# Patient Record
Sex: MALE | Race: WHITE | NOT HISPANIC OR LATINO | Employment: OTHER | URBAN - METROPOLITAN AREA
[De-identification: names, ages, dates, MRNs, and addresses within clinical notes are randomized per-mention and may not be internally consistent; named-entity substitution may affect disease eponyms.]

---

## 2017-01-14 ENCOUNTER — APPOINTMENT (OUTPATIENT)
Dept: LAB | Facility: HOSPITAL | Age: 62
End: 2017-01-14
Attending: INTERNAL MEDICINE
Payer: COMMERCIAL

## 2017-01-14 ENCOUNTER — TRANSCRIBE ORDERS (OUTPATIENT)
Dept: LAB | Facility: HOSPITAL | Age: 62
End: 2017-01-14

## 2017-01-14 DIAGNOSIS — Z12.5 SPECIAL SCREENING FOR MALIGNANT NEOPLASM OF PROSTATE: ICD-10-CM

## 2017-01-14 DIAGNOSIS — I10 ESSENTIAL HYPERTENSION, MALIGNANT: ICD-10-CM

## 2017-01-14 DIAGNOSIS — Z12.5 SPECIAL SCREENING FOR MALIGNANT NEOPLASM OF PROSTATE: Primary | ICD-10-CM

## 2017-01-14 LAB
ALBUMIN SERPL BCP-MCNC: 3.6 G/DL (ref 3.5–5)
ALP SERPL-CCNC: 48 U/L (ref 46–116)
ALT SERPL W P-5'-P-CCNC: 37 U/L (ref 12–78)
ANION GAP SERPL CALCULATED.3IONS-SCNC: 5 MMOL/L (ref 4–13)
AST SERPL W P-5'-P-CCNC: 14 U/L (ref 5–45)
BILIRUB SERPL-MCNC: 0.48 MG/DL (ref 0.2–1)
BUN SERPL-MCNC: 17 MG/DL (ref 5–25)
CALCIUM SERPL-MCNC: 8.9 MG/DL (ref 8.3–10.1)
CHLORIDE SERPL-SCNC: 106 MMOL/L (ref 100–108)
CO2 SERPL-SCNC: 29 MMOL/L (ref 21–32)
CREAT SERPL-MCNC: 0.91 MG/DL (ref 0.6–1.3)
EST. AVERAGE GLUCOSE BLD GHB EST-MCNC: 157 MG/DL
GFR SERPL CREATININE-BSD FRML MDRD: >60 ML/MIN/1.73SQ M
GLUCOSE SERPL-MCNC: 191 MG/DL (ref 65–140)
HBA1C MFR BLD: 7.1 % (ref 4.2–6.3)
POTASSIUM SERPL-SCNC: 4.4 MMOL/L (ref 3.5–5.3)
PROT SERPL-MCNC: 6.8 G/DL (ref 6.4–8.2)
PSA SERPL-MCNC: 0.6 NG/ML (ref 0–4)
SODIUM SERPL-SCNC: 140 MMOL/L (ref 136–145)

## 2017-01-14 PROCEDURE — G0103 PSA SCREENING: HCPCS

## 2017-01-14 PROCEDURE — 80053 COMPREHEN METABOLIC PANEL: CPT

## 2017-01-14 PROCEDURE — 83036 HEMOGLOBIN GLYCOSYLATED A1C: CPT

## 2017-01-14 PROCEDURE — 36415 COLL VENOUS BLD VENIPUNCTURE: CPT

## 2017-10-20 LAB
LEFT EYE DIABETIC RETINOPATHY: NORMAL
RIGHT EYE DIABETIC RETINOPATHY: NORMAL

## 2018-09-06 ENCOUNTER — TRANSCRIBE ORDERS (OUTPATIENT)
Dept: LAB | Facility: HOSPITAL | Age: 63
End: 2018-09-06

## 2018-09-06 ENCOUNTER — APPOINTMENT (OUTPATIENT)
Dept: LAB | Facility: HOSPITAL | Age: 63
End: 2018-09-06
Attending: INTERNAL MEDICINE
Payer: COMMERCIAL

## 2018-09-06 DIAGNOSIS — E11.00 UNCONTROLLED TYPE 2 DIABETES MELLITUS WITH HYPEROSMOLARITY WITHOUT COMA, WITHOUT LONG-TERM CURRENT USE OF INSULIN (HCC): Primary | ICD-10-CM

## 2018-09-06 LAB
ALBUMIN SERPL BCP-MCNC: 3.6 G/DL (ref 3.5–5)
ALP SERPL-CCNC: 38 U/L (ref 46–116)
ALT SERPL W P-5'-P-CCNC: 35 U/L (ref 12–78)
ANION GAP SERPL CALCULATED.3IONS-SCNC: 8 MMOL/L (ref 4–13)
AST SERPL W P-5'-P-CCNC: 17 U/L (ref 5–45)
BASOPHILS # BLD AUTO: 0.03 THOUSANDS/ΜL (ref 0–0.1)
BASOPHILS NFR BLD AUTO: 0 % (ref 0–1)
BILIRUB SERPL-MCNC: 0.76 MG/DL (ref 0.2–1)
BUN SERPL-MCNC: 13 MG/DL (ref 5–25)
CALCIUM SERPL-MCNC: 8.6 MG/DL (ref 8.3–10.1)
CHLORIDE SERPL-SCNC: 104 MMOL/L (ref 100–108)
CHOLEST SERPL-MCNC: 99 MG/DL (ref 50–200)
CO2 SERPL-SCNC: 23 MMOL/L (ref 21–32)
CREAT SERPL-MCNC: 0.92 MG/DL (ref 0.6–1.3)
CREAT UR-MCNC: 53.5 MG/DL
EOSINOPHIL # BLD AUTO: 0.3 THOUSAND/ΜL (ref 0–0.61)
EOSINOPHIL NFR BLD AUTO: 3 % (ref 0–6)
ERYTHROCYTE [DISTWIDTH] IN BLOOD BY AUTOMATED COUNT: 12.8 % (ref 11.6–15.1)
EST. AVERAGE GLUCOSE BLD GHB EST-MCNC: 160 MG/DL
GFR SERPL CREATININE-BSD FRML MDRD: 88 ML/MIN/1.73SQ M
GLUCOSE P FAST SERPL-MCNC: 131 MG/DL (ref 65–99)
HBA1C MFR BLD: 7.2 % (ref 4.2–6.3)
HCT VFR BLD AUTO: 45.6 % (ref 36.5–49.3)
HDLC SERPL-MCNC: 40 MG/DL (ref 40–60)
HGB BLD-MCNC: 15.3 G/DL (ref 12–17)
IMM GRANULOCYTES # BLD AUTO: 0.02 THOUSAND/UL (ref 0–0.2)
IMM GRANULOCYTES NFR BLD AUTO: 0 % (ref 0–2)
LDLC SERPL CALC-MCNC: 47 MG/DL (ref 0–100)
LYMPHOCYTES # BLD AUTO: 3.21 THOUSANDS/ΜL (ref 0.6–4.47)
LYMPHOCYTES NFR BLD AUTO: 34 % (ref 14–44)
MCH RBC QN AUTO: 31 PG (ref 26.8–34.3)
MCHC RBC AUTO-ENTMCNC: 33.6 G/DL (ref 31.4–37.4)
MCV RBC AUTO: 93 FL (ref 82–98)
MICROALBUMIN UR-MCNC: <5 MG/L (ref 0–20)
MICROALBUMIN/CREAT 24H UR: <9 MG/G CREATININE (ref 0–30)
MONOCYTES # BLD AUTO: 1.06 THOUSAND/ΜL (ref 0.17–1.22)
MONOCYTES NFR BLD AUTO: 11 % (ref 4–12)
NEUTROPHILS # BLD AUTO: 4.89 THOUSANDS/ΜL (ref 1.85–7.62)
NEUTS SEG NFR BLD AUTO: 52 % (ref 43–75)
NONHDLC SERPL-MCNC: 59 MG/DL
NRBC BLD AUTO-RTO: 0 /100 WBCS
PLATELET # BLD AUTO: 269 THOUSANDS/UL (ref 149–390)
PMV BLD AUTO: 10.3 FL (ref 8.9–12.7)
POTASSIUM SERPL-SCNC: 3.7 MMOL/L (ref 3.5–5.3)
PROT SERPL-MCNC: 6.7 G/DL (ref 6.4–8.2)
RBC # BLD AUTO: 4.93 MILLION/UL (ref 3.88–5.62)
SODIUM SERPL-SCNC: 135 MMOL/L (ref 136–145)
TRIGL SERPL-MCNC: 60 MG/DL
TSH SERPL DL<=0.05 MIU/L-ACNC: 1.16 UIU/ML (ref 0.36–3.74)
WBC # BLD AUTO: 9.51 THOUSAND/UL (ref 4.31–10.16)

## 2018-09-06 PROCEDURE — 80061 LIPID PANEL: CPT

## 2018-09-06 PROCEDURE — 80053 COMPREHEN METABOLIC PANEL: CPT

## 2018-09-06 PROCEDURE — 82043 UR ALBUMIN QUANTITATIVE: CPT

## 2018-09-06 PROCEDURE — 84443 ASSAY THYROID STIM HORMONE: CPT

## 2018-09-06 PROCEDURE — 83036 HEMOGLOBIN GLYCOSYLATED A1C: CPT

## 2018-09-06 PROCEDURE — 82570 ASSAY OF URINE CREATININE: CPT

## 2018-09-06 PROCEDURE — 85025 COMPLETE CBC W/AUTO DIFF WBC: CPT

## 2018-09-06 PROCEDURE — 36415 COLL VENOUS BLD VENIPUNCTURE: CPT

## 2018-09-20 ENCOUNTER — APPOINTMENT (EMERGENCY)
Dept: RADIOLOGY | Facility: HOSPITAL | Age: 63
End: 2018-09-20
Payer: COMMERCIAL

## 2018-09-20 ENCOUNTER — HOSPITAL ENCOUNTER (EMERGENCY)
Facility: HOSPITAL | Age: 63
Discharge: HOME | End: 2018-09-20
Attending: EMERGENCY MEDICINE
Payer: COMMERCIAL

## 2018-09-20 VITALS
RESPIRATION RATE: 20 BRPM | HEART RATE: 90 BPM | OXYGEN SATURATION: 98 % | SYSTOLIC BLOOD PRESSURE: 125 MMHG | WEIGHT: 240 LBS | HEIGHT: 72 IN | TEMPERATURE: 98.8 F | BODY MASS INDEX: 32.51 KG/M2 | DIASTOLIC BLOOD PRESSURE: 72 MMHG

## 2018-09-20 DIAGNOSIS — R09.81 SINUS CONGESTION: Primary | ICD-10-CM

## 2018-09-20 DIAGNOSIS — R07.9 CHEST PAIN: ICD-10-CM

## 2018-09-20 LAB
ALBUMIN SERPL BCP-MCNC: 3.8 G/DL (ref 3.5–5)
ALP SERPL-CCNC: 37 U/L (ref 46–116)
ALT SERPL W P-5'-P-CCNC: 36 U/L (ref 12–78)
ANION GAP SERPL CALCULATED.3IONS-SCNC: 8 MMOL/L (ref 4–13)
AST SERPL W P-5'-P-CCNC: 15 U/L (ref 5–45)
BASOPHILS # BLD AUTO: 0.05 THOUSANDS/ΜL (ref 0–0.1)
BASOPHILS NFR BLD AUTO: 0 % (ref 0–1)
BILIRUB SERPL-MCNC: 0.48 MG/DL (ref 0.2–1)
BUN SERPL-MCNC: 20 MG/DL (ref 5–25)
CALCIUM SERPL-MCNC: 9.6 MG/DL (ref 8.3–10.1)
CHLORIDE SERPL-SCNC: 105 MMOL/L (ref 100–108)
CO2 SERPL-SCNC: 24 MMOL/L (ref 21–32)
CREAT SERPL-MCNC: 0.86 MG/DL (ref 0.6–1.3)
EOSINOPHIL # BLD AUTO: 0.17 THOUSAND/ΜL (ref 0–0.61)
EOSINOPHIL NFR BLD AUTO: 1 % (ref 0–6)
ERYTHROCYTE [DISTWIDTH] IN BLOOD BY AUTOMATED COUNT: 12.7 % (ref 11.6–15.1)
GFR SERPL CREATININE-BSD FRML MDRD: 92 ML/MIN/1.73SQ M
GLUCOSE SERPL-MCNC: 149 MG/DL (ref 65–140)
HCT VFR BLD AUTO: 44.5 % (ref 36.5–49.3)
HGB BLD-MCNC: 15 G/DL (ref 12–17)
IMM GRANULOCYTES # BLD AUTO: 0.04 THOUSAND/UL (ref 0–0.2)
IMM GRANULOCYTES NFR BLD AUTO: 0 % (ref 0–2)
LYMPHOCYTES # BLD AUTO: 2.95 THOUSANDS/ΜL (ref 0.6–4.47)
LYMPHOCYTES NFR BLD AUTO: 24 % (ref 14–44)
MCH RBC QN AUTO: 31.3 PG (ref 26.8–34.3)
MCHC RBC AUTO-ENTMCNC: 33.7 G/DL (ref 31.4–37.4)
MCV RBC AUTO: 93 FL (ref 82–98)
MONOCYTES # BLD AUTO: 1.19 THOUSAND/ΜL (ref 0.17–1.22)
MONOCYTES NFR BLD AUTO: 10 % (ref 4–12)
NEUTROPHILS # BLD AUTO: 7.7 THOUSANDS/ΜL (ref 1.85–7.62)
NEUTS SEG NFR BLD AUTO: 65 % (ref 43–75)
NRBC BLD AUTO-RTO: 0 /100 WBCS
PLATELET # BLD AUTO: 271 THOUSANDS/UL (ref 149–390)
PMV BLD AUTO: 9.7 FL (ref 8.9–12.7)
POTASSIUM SERPL-SCNC: 4 MMOL/L (ref 3.5–5.3)
PROT SERPL-MCNC: 6.8 G/DL (ref 6.4–8.2)
RBC # BLD AUTO: 4.79 MILLION/UL (ref 3.88–5.62)
SODIUM SERPL-SCNC: 137 MMOL/L (ref 136–145)
TROPONIN I SERPL-MCNC: <0.02 NG/ML
WBC # BLD AUTO: 12.1 THOUSAND/UL (ref 4.31–10.16)

## 2018-09-20 PROCEDURE — 99285 EMERGENCY DEPT VISIT HI MDM: CPT

## 2018-09-20 PROCEDURE — 71046 X-RAY EXAM CHEST 2 VIEWS: CPT

## 2018-09-20 PROCEDURE — 84484 ASSAY OF TROPONIN QUANT: CPT | Performed by: EMERGENCY MEDICINE

## 2018-09-20 PROCEDURE — 36415 COLL VENOUS BLD VENIPUNCTURE: CPT | Performed by: EMERGENCY MEDICINE

## 2018-09-20 PROCEDURE — 93005 ELECTROCARDIOGRAM TRACING: CPT

## 2018-09-20 PROCEDURE — 80053 COMPREHEN METABOLIC PANEL: CPT | Performed by: EMERGENCY MEDICINE

## 2018-09-20 PROCEDURE — 85025 COMPLETE CBC W/AUTO DIFF WBC: CPT | Performed by: EMERGENCY MEDICINE

## 2018-09-20 RX ORDER — SIMVASTATIN 20 MG
20 TABLET ORAL
COMMUNITY
End: 2021-01-11

## 2018-09-20 RX ORDER — ASPIRIN 81 MG/1
81 TABLET ORAL DAILY
COMMUNITY
End: 2022-06-29

## 2018-09-20 RX ORDER — AMLODIPINE AND VALSARTAN 5; 320 MG/1; MG/1
1 TABLET ORAL DAILY
COMMUNITY
End: 2021-08-06

## 2018-09-20 RX ORDER — ALPRAZOLAM 0.5 MG/1
0.5 TABLET ORAL
COMMUNITY
End: 2020-12-30

## 2018-09-20 RX ORDER — FLUTICASONE PROPIONATE 50 MCG
1 SPRAY, SUSPENSION (ML) NASAL DAILY
Qty: 16 G | Refills: 0 | Status: SHIPPED | OUTPATIENT
Start: 2018-09-20 | End: 2021-08-06

## 2018-09-20 NOTE — ED PROVIDER NOTES
History  Chief Complaint   Patient presents with    Chest Pain     pt reports CP, pt admits to anxiety and "overdosing on some medication "     This is a 80-year-old male with history of anxiety, hypertension, type 2 diabetes, who presents with multiple complaints  The patient states that over the last 3 weeks, he has had increased nasal congestion, stating that "I cannot breathe out of my nose, and is cause male lot of anxiety"  The patient states that a week prior to the symptoms onset, he quit cigarettes, and was given Xanax for assistance with quitting cigarettes  The patient states his symptoms  Have worsened over the last 3 days, and now associated with chest pain, described as left-sided, sharp, nonradiating, and constant  He has never had pain like this before  He denies any pleurisy  He states that he "cannot sleep", which is causing him significant anxiety, and making him feel like is going to have a "heart attack" any second, and this is why he comes the emergency department  He also states that he has been taking multiple antihistamines, as well as NyQuil in order to ease his sinus symptoms, and states that he thinks he has "overdosed on these medications" because he is very anxious  The patient states that he thinks he has obstructive sleep apnea, because his cousin is a nurse and diagnosed with sleep apnea, but also notes that he has never received a sleep study, and has never been recommended CPAP  He does endorse a cough, which has been over the last 3 weeks, and productive of sputum but he does not note the color of because he never looks  He denies any fevers or chills  He denies any nausea, vomiting, diarrhea  He has no  History of VTE, unilateral leg pain or swelling, hemoptysis, or pleurisy  He has no recent travel, recent surgeries, recent hospitalizations, recent sick contacts  Prior to Admission Medications   Prescriptions Last Dose Informant Patient Reported? Taking? ALPRAZolam (XANAX) 0 5 mg tablet 9/20/2018 at Unknown time Self Yes Yes   Sig: Take 0 5 mg by mouth daily at bedtime as needed for anxiety   Linagliptin (TRADJENTA PO) 9/20/2018 at Unknown time Self Yes Yes   Sig: Take 5 mg by mouth daily   amLODIPine-valsartan (EXFORGE) 5-320 MG per tablet 9/20/2018 at Unknown time Self Yes Yes   Sig: Take 1 tablet by mouth daily   aspirin (ECOTRIN LOW STRENGTH) 81 mg EC tablet 9/20/2018 at Unknown time  Yes Yes   Sig: Take 81 mg by mouth daily   metFORMIN (GLUCOPHAGE) 1000 MG tablet 9/20/2018 at Unknown time Self Yes Yes   Sig: Take 1,000 mg by mouth 2 (two) times a day with meals   simvastatin (ZOCOR) 20 mg tablet 9/20/2018 at Unknown time Self Yes Yes   Sig: Take 20 mg by mouth daily at bedtime      Facility-Administered Medications: None       Past Medical History:   Diagnosis Date    Diabetes mellitus ([facility] [address] )     Hypertension        History reviewed  No pertinent surgical history  History reviewed  No pertinent family history  I have reviewed and agree with the history as documented  Social History   Substance Use Topics    Smoking status: Former Smoker    Smokeless tobacco: Never Used    Alcohol use No        Review of Systems   Constitutional: Negative for chills and fever  HENT: Positive for congestion and sinus pressure  Negative for facial swelling and sinus pain  Eyes: Negative for photophobia and visual disturbance  Respiratory: Positive for cough and shortness of breath  Cardiovascular: Positive for chest pain  Negative for palpitations  Gastrointestinal: Negative for abdominal pain, diarrhea, nausea and vomiting  Genitourinary: Negative for dysuria and hematuria  Musculoskeletal: Negative for neck pain and neck stiffness  Skin: Negative for pallor and rash  Neurological: Negative for light-headedness and headaches         Physical Exam  ED Triage Vitals   Temperature Pulse Respirations Blood Pressure SpO2   09/20/18 1449 09/20/18 1445 09/20/18 1445 09/20/18 1547 09/20/18 1445   98 3 °F (36 8 °C) 84 (!) 24 129/78 97 %      Temp Source Heart Rate Source Patient Position - Orthostatic VS BP Location FiO2 (%)   09/20/18 1449 09/20/18 1445 09/20/18 1445 09/20/18 1445 --   Oral Monitor Sitting Right arm       Pain Score       09/20/18 1445       5           Orthostatic Vital Signs  Vitals:    09/20/18 1445 09/20/18 1547 09/20/18 1730 09/20/18 1834   BP:  129/78 130/85 125/72   Pulse: 84 70 74 90   Patient Position - Orthostatic VS: Sitting  Lying Lying       Physical Exam   Constitutional: He is oriented to person, place, and time  Awake and alert, anxious appearing  Nontoxic in appearance  No acute distress   HENT:   Head: Normocephalic and atraumatic  Mouth/Throat: Oropharynx is clear and moist  No oropharyngeal exudate  The TMs are normal bilaterally  Clear nasal pharyngeal exudate  Mild tenderness to palpation of the maxillary sinuses   Eyes: Pupils are equal, round, and reactive to light  No scleral icterus  Neck: Normal range of motion  No JVD present  Cardiovascular: Normal rate, regular rhythm and normal heart sounds  No murmur heard  Pulmonary/Chest: Effort normal  No respiratory distress  He has no wheezes  He has no rales  Abdominal: Soft  He exhibits no distension  There is no tenderness  Musculoskeletal: Normal range of motion  He exhibits no edema  Neurological: He is alert and oriented to person, place, and time  He exhibits normal muscle tone  Skin: Skin is warm and dry  No rash noted  No pallor         ED Medications  Medications - No data to display    Diagnostic Studies  Results Reviewed     Procedure Component Value Units Date/Time    Comprehensive metabolic panel [[id number]]  (Abnormal) Collected:  09/20/18 1456    Lab Status:  Final result Specimen:  Blood from Arm, Left Updated:  09/20/18 1526     Sodium 137 mmol/L      Potassium 4 0 mmol/L      Chloride 105 mmol/L      CO2 24 mmol/L      ANION GAP 8 mmol/L      BUN 20 mg/dL      Creatinine 0 86 mg/dL      Glucose 149 (H) mg/dL      Calcium 9 6 mg/dL      AST 15 U/L      ALT 36 U/L      Alkaline Phosphatase 37 (L) U/L      Total Protein 6 8 g/dL      Albumin 3 8 g/dL      Total Bilirubin 0 48 mg/dL      eGFR 92 ml/min/1 73sq m     Narrative:         National Kidney Disease Education Program recommendations are as follows:  GFR calculation is accurate only with a steady state creatinine  Chronic Kidney disease less than 60 ml/min/1 73 sq  meters  Kidney failure less than 15 ml/min/1 73 sq  meters  Troponin I [[id number]]  (Normal) Collected:  09/20/18 1456    Lab Status:  Final result Specimen:  Blood from Arm, Left Updated:  09/20/18 1526     Troponin I <0 02 ng/mL     CBC and differential [[id number]]  (Abnormal) Collected:  09/20/18 1456    Lab Status:  Final result Specimen:  Blood from Arm, Left Updated:  09/20/18 1509     WBC 12 10 (H) Thousand/uL      RBC 4 79 Million/uL      Hemoglobin 15 0 g/dL      Hematocrit 44 5 %      MCV 93 fL      MCH 31 3 pg      MCHC 33 7 g/dL      RDW 12 7 %      MPV 9 7 fL      Platelets 166 Thousands/uL      nRBC 0 /100 WBCs      Neutrophils Relative 65 %      Immat GRANS % 0 %      Lymphocytes Relative 24 %      Monocytes Relative 10 %      Eosinophils Relative 1 %      Basophils Relative 0 %      Neutrophils Absolute 7 70 (H) Thousands/µL      Immature Grans Absolute 0 04 Thousand/uL      Lymphocytes Absolute 2 95 Thousands/µL      Monocytes Absolute 1 19 Thousand/µL      Eosinophils Absolute 0 17 Thousand/µL      Basophils Absolute 0 05 Thousands/µL                  X-ray chest 2 views   Final Result by Anushka Snow MD (09/20 1539)      No acute cardiopulmonary disease              Workstation performed: [id number]               Procedures  ECG 12 Lead Documentation  Date/Time: 9/20/2018 9:01 PM  Performed by: Supriya Wynn  Authorized by: Supriya Wynn     Patient location:  ED  Previous ECG:     Previous ECG:  Compared to current    Similarity:  No change  Interpretation:     Interpretation: normal    Comments:       Normal sinus rhythm with a heart rate of 90  Normal axis, normal intervals  No ST /T-wave changes  On comparison to prior EKG, there are no changes  Phone Consults  ED Phone Contact    ED Course                               MDM  Number of Diagnoses or Management Options  Chest pain:   Sinus congestion:   Diagnosis management comments: This is a 77-year-old male who presents with 3 days of chest pain, on 3 weeks of upper respiratory symptoms, including significant nasal congestion, cough, and sinus pain, concerning for viral upper respiratory infection  On arrival, the patient is afebrile, hemodynamically stable, well-appearing, and does not appear to be in acute distress  His physical exam is noted above, and specifically, the patient has a normal cardiopulmonary exam, is afebrile, and has no other suspected source of infection   -  Initial lab work was unremarkable, including no leukocytosis  -  Troponin was negative  - chest x-ray unremarkable  - initial EKG was not ischemic    PLAN    1  Chest pain -  Unclear etiology  Troponin negative, EKG unchanged  Based on the patient's duration of constant symptoms, cardiac etiology appears less likely given these findings  Heart score is 3  Recommend outpatient follow-up for further management of his chest pain    2  Anxiety/insomnia -   Likely multifactorial, given the patient recently quit smoking, is taking multiple Antihistamine medications which are likely causing his insomnia, and also appears to have significant anxiety for which he has not been taking his prescribe Xanax  Recommend discontinuing antihistamines  Recommend further outpatient follow-up, and emphasized the importance of compliance with prescribed medications    3  Nasal congestion -  Likely viral URI  Chest x-ray was unremarkable  No white count    Frequency in nature sputum production not consistent with bacterial illness, and physical exam is also inconsistent with bacterial illness  Recommend supportive care  Will also prescribe Flonase for nasal congestion  4    Disposition -  Plan for discharge home  Strict return precautions provided  CritCare Time    Disposition  Final diagnoses:   Sinus congestion   Chest pain     Time reflects when diagnosis was documented in both MDM as applicable and the Disposition within this note     Time User Action Codes Description Comment    9/20/2018  6:42 PM Allerton Allison Add [R09 81] Sinus congestion     9/20/2018  6:42 PM Allerton Allison Add [R07 9] Chest pain       ED Disposition     ED Disposition Condition Comment    Discharge  Dominik Reed discharge to home/self care  Condition at discharge: Good        Follow-up Information     Follow up With Specialties Details Why Contact Info Additional Information    Sakina Magana MD Internal Medicine   [address]  [address] Michelle Keane MD Internal Medicine   [address] [facility] [address]  [address]  [address] [facility] [address] Sleep Medicine   [address] [facility]  [address] [facility], [address], [facility]  **NOTE**  If patient is being seen for a SLEEP YOLANDA CPAP, patient needs to report to the [address]  **NOTE**  If patient is being seen for a SLEEP YOLANDA STUDY, patient needs to report to the [address]  **NOTE**   If patient is being seen for a CRIB STUDY, patient needs to report to the [address]            Patient's Medications   Discharge Prescriptions    FLUTICASONE (FLONASE) 50 MCG/ACT NASAL SPRAY    1 spray into each nostril daily       Start Date: 9/20/2018 End Date: --       Order Dose: 1 spray       Quantity: 16 g    Refills: 0     No discharge procedures on file  ED Provider  Attending physically available and evaluated Dominik Reed I managed the patient along with the ED Attending      Electronically Signed by         Yee Bear MD  09/20/18 8785

## 2018-09-20 NOTE — DISCHARGE INSTRUCTIONS
Chest Pain, Ambulatory Care   GENERAL INFORMATION:   Chest pain  can be caused by a range of conditions, from not serious to life-threatening  It may be caused by a heart attack or a blood clot in your lungs  Sometimes chest pain or pressure is caused by poor blood flow to your heart (angina)  Infection, inflammation, or a fracture in the bones or cartilage in your chest can cause pain or discomfort  Chest pain can also be a symptom of a digestive problem, such as acid reflux or a stomach ulcer  Common symptoms include the following:   · Fever or sweating     · Nausea or vomiting     · Shortness of breath     · Discomfort or pressure that spreads from your chest to your back, jaw, or arm     · A racing or slow heartbeat     · Feeling weak, tired, or faint  Seek immediate care for the following symptoms:   · Any of the following signs of a heart attack:      ¨ Squeezing, pressure, or pain in your chest that lasts longer than 5 minutes or returns    ¨ Discomfort or pain in your back, neck, jaw, stomach, or arm     ¨ Trouble breathing     ¨ Nausea or vomiting    ¨ Lightheadedness or a sudden cold sweat, especially with trouble breathing         · Chest discomfort that gets worse, even with medicine    · Coughing or vomiting blood    · Black or bloody bowel movements     · Vomiting that does not stop, or pain when you swallow  Treatment for chest pain  may include medicine to treat your symptoms while he determines the cause of your chest pain  You may also need any of the following:  · Antiplatelets , such as aspirin, help prevent blood clots  Take your antiplatelet medicine exactly as directed  These medicines make it more likely for you to bleed or bruise  If you are told to take aspirin, do not take acetaminophen or ibuprofen instead  · Prescription pain medicine  may be given  Ask how to take this medicine safely  Do not smoke: If you smoke, it is never too late to quit   Smoking increases your risk for a heart attack and other heart and lung conditions  Ask your healthcare provider for information about how to stop smoking if you need help  Follow up with your healthcare provider as directed: You may need more tests  You may be referred to a specialist, such as a cardiologist or gastroenterologist  Write down your questions so you remember to ask them during your visits  CARE AGREEMENT:   You have the right to help plan your care  Learn about your health condition and how it may be treated  Discuss treatment options with your caregivers to decide what care you want to receive  You always have the right to refuse treatment  The above information is an  only  It is not intended as medical advice for individual conditions or treatments  Talk to your doctor, nurse or pharmacist before following any medical regimen to see if it is safe and effective for you  © 2014 [address] is for End User's use only and may not be sold, redistributed or otherwise used for commercial purposes  All illustrations and images included in CareNotes® are the copyrighted property of [employer]  or Zana Pastor  Cold Symptoms, Ambulatory Care   GENERAL INFORMATION:   Cold symptoms  include sneezing, dry throat, a stuffy nose, headache, watery eyes, and a cough  Your cough may be dry, or you may cough up mucus  You may also have muscle aches, joint pain, and tiredness  Rarely, you may have a fever  Cold symptoms occur from inflammation in your upper respiratory system caused by a virus  Most colds go away without treatment  Seek immediate care for the following symptoms:   · A heartbeat that is much faster than usual for you     · A swollen neck that is sore to the touch     · Increased tiredness and weakness    · Pinpoint or larger reddish-purple dots on your skin     · Poor or no appetite  Treatment for cold symptoms  may include NSAIDS to decrease muscle aches and fever   Do not give NSAID medicines to children under 10months of age without direction from your child's doctor  Cold medicines may also be given to decrease coughing, nasal stuffiness, sneezing, and a runny nose  Do not give cold medicines to children under 11years of age without direction from your child's doctor  Manage your cold symptoms with the following:   · Drink liquids  to help thin and loosen thick mucus so you can cough it up  Liquids will also keep you hydrated  Ask your healthcare provider which liquids are best for you and how much to drink each day  · Do not smoke  because it may worsen your symptoms and increase the length of time you feel sick  Talk with your healthcare provider if you need help to stop smoking  Prevent the spread of germs  by washing your hands often  You can spread your cold germs to others for at least 3 days after your symptoms start  Do not share items, such as eating utensils  Cover your nose and mouth when you cough or sneeze using the crook of your elbow instead of your hands  Throw used tissues in the garbage  Follow up with your healthcare provider as directed:  Write down your questions so you remember to ask them during your visits  CARE AGREEMENT:   You have the right to help plan your care  Learn about your health condition and how it may be treated  Discuss treatment options with your caregivers to decide what care you want to receive  You always have the right to refuse treatment  The above information is an  only  It is not intended as medical advice for individual conditions or treatments  Talk to your doctor, nurse or pharmacist before following any medical regimen to see if it is safe and effective for you  © 2014 [address] is for End User's use only and may not be sold, redistributed or otherwise used for commercial purposes   All illustrations and images included in CareNotes® are the copyrighted property of [employer]  or Zana Pastor

## 2018-09-21 LAB
ATRIAL RATE: 90 BPM
P AXIS: 60 DEGREES
PR INTERVAL: 154 MS
QRS AXIS: 23 DEGREES
QRSD INTERVAL: 78 MS
QT INTERVAL: 358 MS
QTC INTERVAL: 437 MS
T WAVE AXIS: 25 DEGREES
VENTRICULAR RATE: 90 BPM

## 2018-09-21 PROCEDURE — 93010 ELECTROCARDIOGRAM REPORT: CPT | Performed by: INTERNAL MEDICINE

## 2020-09-09 DIAGNOSIS — I10 ESSENTIAL HYPERTENSION, BENIGN: Primary | ICD-10-CM

## 2020-09-09 RX ORDER — LISINOPRIL 5 MG/1
TABLET ORAL
Qty: 90 TABLET | Refills: 0 | Status: SHIPPED | OUTPATIENT
Start: 2020-09-09 | End: 2021-01-25 | Stop reason: SDUPTHER

## 2020-09-10 DIAGNOSIS — E11.65 UNCONTROLLED TYPE 2 DIABETES MELLITUS WITH HYPERGLYCEMIA (HCC): Primary | ICD-10-CM

## 2020-09-10 LAB
ALBUMIN SERPL-MCNC: 4.3 G/DL (ref 3.6–5.1)
ALBUMIN/CREAT UR: 7 MCG/MG CREAT
ALBUMIN/GLOB SERPL: 2.3 (CALC) (ref 1–2.5)
ALP SERPL-CCNC: 35 U/L (ref 35–144)
ALT SERPL-CCNC: 30 U/L (ref 9–46)
AST SERPL-CCNC: 23 U/L (ref 10–35)
BASOPHILS # BLD AUTO: 74 CELLS/UL (ref 0–200)
BASOPHILS NFR BLD AUTO: 0.6 %
BILIRUB SERPL-MCNC: 1.2 MG/DL (ref 0.2–1.2)
BUN SERPL-MCNC: 18 MG/DL (ref 7–25)
BUN/CREAT SERPL: ABNORMAL (CALC) (ref 6–22)
CALCIUM SERPL-MCNC: 9.1 MG/DL (ref 8.6–10.3)
CHLORIDE SERPL-SCNC: 104 MMOL/L (ref 98–110)
CHOLEST SERPL-MCNC: 109 MG/DL
CHOLEST/HDLC SERPL: 2.6 (CALC)
CO2 SERPL-SCNC: 25 MMOL/L (ref 20–32)
CREAT SERPL-MCNC: 0.93 MG/DL (ref 0.7–1.25)
CREAT UR-MCNC: 148 MG/DL (ref 20–320)
EOSINOPHIL # BLD AUTO: 406 CELLS/UL (ref 15–500)
EOSINOPHIL NFR BLD AUTO: 3.3 %
ERYTHROCYTE [DISTWIDTH] IN BLOOD BY AUTOMATED COUNT: 13.6 % (ref 11–15)
GLOBULIN SER CALC-MCNC: 1.9 G/DL (CALC) (ref 1.9–3.7)
GLUCOSE SERPL-MCNC: 190 MG/DL (ref 65–99)
HBA1C MFR BLD: 4.8 % OF TOTAL HGB
HCT VFR BLD AUTO: 40.5 % (ref 38.5–50)
HDLC SERPL-MCNC: 42 MG/DL
HGB BLD-MCNC: 13.4 G/DL (ref 13.2–17.1)
LDLC SERPL CALC-MCNC: 49 MG/DL (CALC)
LYMPHOCYTES # BLD AUTO: 4133 CELLS/UL (ref 850–3900)
LYMPHOCYTES NFR BLD AUTO: 33.6 %
MCH RBC QN AUTO: 33.4 PG (ref 27–33)
MCHC RBC AUTO-ENTMCNC: 33.1 G/DL (ref 32–36)
MCV RBC AUTO: 101 FL (ref 80–100)
MICROALBUMIN UR-MCNC: 1.1 MG/DL
MONOCYTES # BLD AUTO: 1193 CELLS/UL (ref 200–950)
MONOCYTES NFR BLD AUTO: 9.7 %
NEUTROPHILS # BLD AUTO: 6494 CELLS/UL (ref 1500–7800)
NEUTROPHILS NFR BLD AUTO: 52.8 %
NONHDLC SERPL-MCNC: 67 MG/DL (CALC)
PLATELET # BLD AUTO: 304 THOUSAND/UL (ref 140–400)
PMV BLD REES-ECKER: 10 FL (ref 7.5–12.5)
POTASSIUM SERPL-SCNC: 4.5 MMOL/L (ref 3.5–5.3)
PROT SERPL-MCNC: 6.2 G/DL (ref 6.1–8.1)
RBC # BLD AUTO: 4.01 MILLION/UL (ref 4.2–5.8)
SL AMB EGFR AFRICAN AMERICAN: 99 ML/MIN/1.73M2
SL AMB EGFR NON AFRICAN AMERICAN: 86 ML/MIN/1.73M2
SODIUM SERPL-SCNC: 137 MMOL/L (ref 135–146)
TRIGL SERPL-MCNC: 94 MG/DL
TSH SERPL-ACNC: 1.61 MIU/L (ref 0.4–4.5)
WBC # BLD AUTO: 12.3 THOUSAND/UL (ref 3.8–10.8)

## 2020-09-14 ENCOUNTER — TELEPHONE (OUTPATIENT)
Dept: INTERNAL MEDICINE CLINIC | Facility: CLINIC | Age: 65
End: 2020-09-14

## 2020-09-14 NOTE — TELEPHONE ENCOUNTER
Pt states he received results from [facility]  He said "according to the results, he should be dead"  He has an appt scheduled with you this week

## 2020-09-16 ENCOUNTER — OFFICE VISIT (OUTPATIENT)
Dept: INTERNAL MEDICINE CLINIC | Facility: CLINIC | Age: 65
End: 2020-09-16
Payer: MEDICARE

## 2020-09-16 VITALS
WEIGHT: 267 LBS | HEIGHT: 72 IN | DIASTOLIC BLOOD PRESSURE: 83 MMHG | TEMPERATURE: 97.9 F | HEART RATE: 84 BPM | BODY MASS INDEX: 36.16 KG/M2 | SYSTOLIC BLOOD PRESSURE: 134 MMHG

## 2020-09-16 DIAGNOSIS — E78.2 MIXED HYPERLIPIDEMIA: ICD-10-CM

## 2020-09-16 DIAGNOSIS — I10 ESSENTIAL HYPERTENSION: ICD-10-CM

## 2020-09-16 DIAGNOSIS — Z23 NEED FOR INFLUENZA VACCINATION: ICD-10-CM

## 2020-09-16 DIAGNOSIS — E11.65 UNCONTROLLED TYPE 2 DIABETES MELLITUS WITH HYPERGLYCEMIA (HCC): Primary | ICD-10-CM

## 2020-09-16 DIAGNOSIS — Z12.11 SPECIAL SCREENING FOR MALIGNANT NEOPLASMS, COLON: ICD-10-CM

## 2020-09-16 DIAGNOSIS — F34.1 DYSTHYMIC DISORDER: ICD-10-CM

## 2020-09-16 PROCEDURE — G0008 ADMIN INFLUENZA VIRUS VAC: HCPCS | Performed by: INTERNAL MEDICINE

## 2020-09-16 PROCEDURE — 90653 IIV ADJUVANT VACCINE IM: CPT | Performed by: INTERNAL MEDICINE

## 2020-09-16 PROCEDURE — 99214 OFFICE O/P EST MOD 30 MIN: CPT | Performed by: INTERNAL MEDICINE

## 2020-09-16 RX ORDER — B-COMPLEX WITH VITAMIN C
TABLET ORAL
COMMUNITY
End: 2021-08-06

## 2020-12-30 DIAGNOSIS — F41.9 ANXIETY DISORDER, UNSPECIFIED: ICD-10-CM

## 2020-12-30 RX ORDER — ALPRAZOLAM 0.5 MG/1
TABLET ORAL
Qty: 60 TABLET | Refills: 0 | Status: SHIPPED | OUTPATIENT
Start: 2020-12-30 | End: 2021-08-06 | Stop reason: SDUPTHER

## 2021-01-08 ENCOUNTER — TELEPHONE (OUTPATIENT)
Dept: INTERNAL MEDICINE CLINIC | Facility: CLINIC | Age: 66
End: 2021-01-08

## 2021-01-08 DIAGNOSIS — Z11.59 ENCOUNTER FOR HEPATITIS C SCREENING TEST FOR LOW RISK PATIENT: ICD-10-CM

## 2021-01-08 DIAGNOSIS — E11.65 UNCONTROLLED TYPE 2 DIABETES MELLITUS WITH HYPERGLYCEMIA (HCC): Primary | ICD-10-CM

## 2021-01-08 NOTE — TELEPHONE ENCOUNTER
PT WOULD LIKE BLOOD WORK ORDERED BEFORE HIS UPCOMING APPT, HE WILL GO TO [facility] FOR LABS

## 2021-01-11 ENCOUNTER — LAB (OUTPATIENT)
Dept: LAB | Facility: HOSPITAL | Age: 66
End: 2021-01-11
Attending: INTERNAL MEDICINE
Payer: COMMERCIAL

## 2021-01-11 DIAGNOSIS — Z11.59 ENCOUNTER FOR HEPATITIS C SCREENING TEST FOR LOW RISK PATIENT: ICD-10-CM

## 2021-01-11 DIAGNOSIS — E11.65 UNCONTROLLED TYPE 2 DIABETES MELLITUS WITH HYPERGLYCEMIA (HCC): ICD-10-CM

## 2021-01-11 DIAGNOSIS — E78.5 HYPERLIPIDEMIA, UNSPECIFIED: ICD-10-CM

## 2021-01-11 LAB
ALBUMIN SERPL BCP-MCNC: 3.9 G/DL (ref 3.5–5)
ALP SERPL-CCNC: 46 U/L (ref 46–116)
ALT SERPL W P-5'-P-CCNC: 45 U/L (ref 12–78)
ANION GAP SERPL CALCULATED.3IONS-SCNC: 5 MMOL/L (ref 4–13)
AST SERPL W P-5'-P-CCNC: 22 U/L (ref 5–45)
BASOPHILS # BLD AUTO: 0.03 THOUSANDS/ΜL (ref 0–0.1)
BASOPHILS NFR BLD AUTO: 0 % (ref 0–1)
BILIRUB SERPL-MCNC: 0.29 MG/DL (ref 0.2–1)
BUN SERPL-MCNC: 17 MG/DL (ref 5–25)
CALCIUM SERPL-MCNC: 9.4 MG/DL (ref 8.3–10.1)
CHLORIDE SERPL-SCNC: 108 MMOL/L (ref 100–108)
CHOLEST SERPL-MCNC: 153 MG/DL (ref 50–200)
CO2 SERPL-SCNC: 24 MMOL/L (ref 21–32)
CREAT SERPL-MCNC: 1.02 MG/DL (ref 0.6–1.3)
CREAT UR-MCNC: 180 MG/DL
EOSINOPHIL # BLD AUTO: 0.42 THOUSAND/ΜL (ref 0–0.61)
EOSINOPHIL NFR BLD AUTO: 4 % (ref 0–6)
ERYTHROCYTE [DISTWIDTH] IN BLOOD BY AUTOMATED COUNT: 11.8 % (ref 11.6–15.1)
EST. AVERAGE GLUCOSE BLD GHB EST-MCNC: 186 MG/DL
GFR SERPL CREATININE-BSD FRML MDRD: 77 ML/MIN/1.73SQ M
GLUCOSE P FAST SERPL-MCNC: 239 MG/DL (ref 65–99)
HBA1C MFR BLD: 8.1 %
HCT VFR BLD AUTO: 50.7 % (ref 36.5–49.3)
HCV AB SER QL: NORMAL
HDLC SERPL-MCNC: 50 MG/DL
HGB BLD-MCNC: 16.6 G/DL (ref 12–17)
IMM GRANULOCYTES # BLD AUTO: 0.03 THOUSAND/UL (ref 0–0.2)
IMM GRANULOCYTES NFR BLD AUTO: 0 % (ref 0–2)
LDLC SERPL CALC-MCNC: 78 MG/DL (ref 0–100)
LYMPHOCYTES # BLD AUTO: 3.93 THOUSANDS/ΜL (ref 0.6–4.47)
LYMPHOCYTES NFR BLD AUTO: 37 % (ref 14–44)
MCH RBC QN AUTO: 31.8 PG (ref 26.8–34.3)
MCHC RBC AUTO-ENTMCNC: 32.7 G/DL (ref 31.4–37.4)
MCV RBC AUTO: 97 FL (ref 82–98)
MICROALBUMIN UR-MCNC: 33.5 MG/L (ref 0–20)
MICROALBUMIN/CREAT 24H UR: 19 MG/G CREATININE (ref 0–30)
MONOCYTES # BLD AUTO: 1.05 THOUSAND/ΜL (ref 0.17–1.22)
MONOCYTES NFR BLD AUTO: 10 % (ref 4–12)
NEUTROPHILS # BLD AUTO: 5.19 THOUSANDS/ΜL (ref 1.85–7.62)
NEUTS SEG NFR BLD AUTO: 49 % (ref 43–75)
NRBC BLD AUTO-RTO: 0 /100 WBCS
PLATELET # BLD AUTO: 274 THOUSANDS/UL (ref 149–390)
PMV BLD AUTO: 10.8 FL (ref 8.9–12.7)
POTASSIUM SERPL-SCNC: 3.9 MMOL/L (ref 3.5–5.3)
PROT SERPL-MCNC: 7.1 G/DL (ref 6.4–8.2)
RBC # BLD AUTO: 5.22 MILLION/UL (ref 3.88–5.62)
SODIUM SERPL-SCNC: 137 MMOL/L (ref 136–145)
TRIGL SERPL-MCNC: 125 MG/DL
TSH SERPL DL<=0.05 MIU/L-ACNC: 2.46 UIU/ML (ref 0.36–3.74)
WBC # BLD AUTO: 10.65 THOUSAND/UL (ref 4.31–10.16)

## 2021-01-11 PROCEDURE — 83036 HEMOGLOBIN GLYCOSYLATED A1C: CPT

## 2021-01-11 PROCEDURE — 80061 LIPID PANEL: CPT

## 2021-01-11 PROCEDURE — 85025 COMPLETE CBC W/AUTO DIFF WBC: CPT

## 2021-01-11 PROCEDURE — 82570 ASSAY OF URINE CREATININE: CPT | Performed by: INTERNAL MEDICINE

## 2021-01-11 PROCEDURE — 86803 HEPATITIS C AB TEST: CPT

## 2021-01-11 PROCEDURE — 36415 COLL VENOUS BLD VENIPUNCTURE: CPT

## 2021-01-11 PROCEDURE — 3052F HG A1C>EQUAL 8.0%<EQUAL 9.0%: CPT | Performed by: INTERNAL MEDICINE

## 2021-01-11 PROCEDURE — 80053 COMPREHEN METABOLIC PANEL: CPT

## 2021-01-11 PROCEDURE — 3061F NEG MICROALBUMINURIA REV: CPT | Performed by: INTERNAL MEDICINE

## 2021-01-11 PROCEDURE — 84443 ASSAY THYROID STIM HORMONE: CPT

## 2021-01-11 PROCEDURE — 82043 UR ALBUMIN QUANTITATIVE: CPT | Performed by: INTERNAL MEDICINE

## 2021-01-11 RX ORDER — SIMVASTATIN 20 MG
TABLET ORAL
Qty: 90 TABLET | Refills: 1 | Status: SHIPPED | OUTPATIENT
Start: 2021-01-11 | End: 2021-07-19

## 2021-01-12 ENCOUNTER — OFFICE VISIT (OUTPATIENT)
Dept: INTERNAL MEDICINE CLINIC | Facility: CLINIC | Age: 66
End: 2021-01-12
Payer: COMMERCIAL

## 2021-01-12 VITALS
SYSTOLIC BLOOD PRESSURE: 136 MMHG | HEIGHT: 72 IN | TEMPERATURE: 97.3 F | BODY MASS INDEX: 35.76 KG/M2 | HEART RATE: 89 BPM | DIASTOLIC BLOOD PRESSURE: 86 MMHG | WEIGHT: 264 LBS

## 2021-01-12 DIAGNOSIS — Z00.00 WELCOME TO MEDICARE PREVENTIVE VISIT: ICD-10-CM

## 2021-01-12 DIAGNOSIS — F34.1 DYSTHYMIC DISORDER: ICD-10-CM

## 2021-01-12 DIAGNOSIS — E11.65 UNCONTROLLED TYPE 2 DIABETES MELLITUS WITH HYPERGLYCEMIA (HCC): Primary | ICD-10-CM

## 2021-01-12 DIAGNOSIS — Z53.20 COLON CANCER SCREENING DECLINED: ICD-10-CM

## 2021-01-12 DIAGNOSIS — E66.01 OBESITY, MORBID (HCC): ICD-10-CM

## 2021-01-12 DIAGNOSIS — I10 ESSENTIAL HYPERTENSION, BENIGN: ICD-10-CM

## 2021-01-12 DIAGNOSIS — E78.2 MIXED HYPERLIPIDEMIA: ICD-10-CM

## 2021-01-12 PROCEDURE — 99214 OFFICE O/P EST MOD 30 MIN: CPT | Performed by: INTERNAL MEDICINE

## 2021-01-12 PROCEDURE — 3075F SYST BP GE 130 - 139MM HG: CPT | Performed by: INTERNAL MEDICINE

## 2021-01-12 PROCEDURE — G0402 INITIAL PREVENTIVE EXAM: HCPCS | Performed by: INTERNAL MEDICINE

## 2021-01-12 PROCEDURE — 1160F RVW MEDS BY RX/DR IN RCRD: CPT | Performed by: INTERNAL MEDICINE

## 2021-01-12 PROCEDURE — 3288F FALL RISK ASSESSMENT DOCD: CPT | Performed by: INTERNAL MEDICINE

## 2021-01-12 PROCEDURE — 1036F TOBACCO NON-USER: CPT | Performed by: INTERNAL MEDICINE

## 2021-01-12 PROCEDURE — 1125F AMNT PAIN NOTED PAIN PRSNT: CPT | Performed by: INTERNAL MEDICINE

## 2021-01-12 PROCEDURE — 3079F DIAST BP 80-89 MM HG: CPT | Performed by: INTERNAL MEDICINE

## 2021-01-12 PROCEDURE — 1170F FXNL STATUS ASSESSED: CPT | Performed by: INTERNAL MEDICINE

## 2021-01-12 PROCEDURE — 1101F PT FALLS ASSESS-DOCD LE1/YR: CPT | Performed by: INTERNAL MEDICINE

## 2021-01-12 PROCEDURE — 3725F SCREEN DEPRESSION PERFORMED: CPT | Performed by: INTERNAL MEDICINE

## 2021-01-12 PROCEDURE — G0403 EKG FOR INITIAL PREVENT EXAM: HCPCS | Performed by: INTERNAL MEDICINE

## 2021-01-12 PROCEDURE — 3008F BODY MASS INDEX DOCD: CPT | Performed by: INTERNAL MEDICINE

## 2021-01-12 NOTE — PROGRESS NOTES
Assessment and Plan:     Problem List Items Addressed This Visit        Endocrine    Type II diabetes mellitus, uncontrolled ([address] ) - Primary       Cardiovascular and Mediastinum    Essential hypertension, benign       Other    Hyperlipidemia    Dysthymic disorder    Colon cancer screening declined    Body mass index 35 0-35 9, adult      Other Visit Diagnoses     Welcome to Medicare preventive visit               Preventive health issues were discussed with patient, and age appropriate screening tests were ordered as noted in patient's After Visit Summary  Personalized health advice and appropriate referrals for health education or preventive services given if needed, as noted in patient's After Visit Summary  History of Present Illness:     Patient presents for Welcome to Medicare visit       Patient Care Team:  Esthela Ackerman MD as PCP - General (Internal Medicine)     Review of Systems:     Review of Systems   Problem List:     Patient Active Problem List   Diagnosis    Hypertension    Hyperlipidemia    Essential hypertension, benign    Type II diabetes mellitus, uncontrolled ([address] )    Anxiety disorder    Dysthymic disorder    Body mass index 36 0-36 9, adult    Dyslipidemia    Diabetes mellitus, type 2 ([address] )    Colon cancer screening declined    Body mass index 35 0-35 9, adult      Past Medical and Surgical History:     Past Medical History:   Diagnosis Date    Acquired hypothyroidism     Anxiety     Anxiety disorder     Cigarette smoker     Colon cancer screening declined     does understand the risk of missing colon cancer - As per [employer]    Diabetes mellitus ([address] )     Diabetes mellitus, type 2 ([address] )     Dyslipidemia     Essential hypertension, benign     Hyperlipidemia     Hypertension     Kidney stone     Type II diabetes mellitus, uncontrolled ([address] )      Past Surgical History:   Procedure Laterality Date    CARDIAC CATHETERIZATION      1/19/09 no obstructive- Dr Gerri Sy  KIDNEY STONE SURGERY      ? 2006 s/p stone removal        Family History:     Family History   Problem Relation Age of Onset    Heart disease Father     Heart attack Father       Social History:        Social History     Socioeconomic History    Marital status: /Civil Union     Spouse name: None    Number of children: 2    Years of education: None    Highest education level: None   Occupational History    None   Social Needs    Financial resource strain: None    Food insecurity     Worry: None     Inability: None    Transportation needs     Medical: None     Non-medical: None   Tobacco Use    Smoking status: Former Smoker     Packs/day: 0 50     Years: 5 00     Pack years: 2 50     Quit date: 2018     Years since quittin 3    Smokeless tobacco: Never Used   Substance and Sexual Activity    Alcohol use:  Yes     Alcohol/week: 1 0 standard drinks     Types: 1 Glasses of wine per week     Frequency: Monthly or less     Drinks per session: 1 or 2     Binge frequency: Never     Comment: rare    Drug use: No    Sexual activity: None   Lifestyle    Physical activity     Days per week: None     Minutes per session: None    Stress: None   Relationships    Social connections     Talks on phone: None     Gets together: None     Attends Confucianism service: None     Active member of club or organization: None     Attends meetings of clubs or organizations: None     Relationship status: None    Intimate partner violence     Fear of current or ex partner: None     Emotionally abused: None     Physically abused: None     Forced sexual activity: None   Other Topics Concern    None   Social History Narrative    No alcohol use    Children: 2 daughters    Exercise: No    Caffeine: No      - As per [employer]      Medications and Allergies:     Current Outpatient Medications   Medication Sig Dispense Refill    ALPRAZolam (XANAX) 0 5 mg tablet TAKE 1 TABLET BY MOUTH TWICE A DAY AS NEEDED 60 tablet 0    amLODIPine-valsartan (EXFORGE) 5-320 MG per tablet Take 1 tablet by mouth daily      aspirin (ECOTRIN LOW STRENGTH) 81 mg EC tablet Take 81 mg by mouth daily      CINNAMON PO Take by mouth      lisinopril (ZESTRIL) 5 mg tablet TAKE 1 TABLET BY MOUTH EVERY DAY 90 tablet 0    metFORMIN (GLUCOPHAGE) 1000 MG tablet TAKE 1 TABLET BY MOUTH TWICE A DAY (Patient taking differently: 500 mg 2 (two) times a day with meals ) 180 tablet 0    Multiple Minerals-Vitamins (Mannie-Mag-Zinc-D) TABS Take by mouth 3 daily      patient supplied medication Multi-Betic vitamin      simvastatin (ZOCOR) 20 mg tablet TAKE 1 TABLET BY MOUTH EVERYDAY AT BEDTIME 90 tablet 1    VITAMIN D PO Take 10,000 Units by mouth      APPLE CIDER VINEGAR PO Take by mouth 2 daily      fluticasone (FLONASE) 50 mcg/act nasal spray 1 spray into each nostril daily (Patient not taking: Reported on 1/12/2021) 16 g 0     No current facility-administered medications for this visit  No Known Allergies   Immunizations:     Immunization History   Administered Date(s) Administered    INFLUENZA 09/16/2020    influenza, trivalent, adjuvanted 09/16/2020      Health Maintenance:         Topic Date Due    HIV Screening  05/12/1970    Colorectal Cancer Screening  05/12/2005    Hepatitis C Screening  Completed         Topic Date Due    DTaP,Tdap,and Td Vaccines (1 - Tdap) 05/12/1976    Pneumococcal Vaccine: 65+ Years (1 of 1 - PPSV23) 05/12/2020      Medicare Screening Tests and Risk Assessments:     Dominik is here for his Welcome to Medicare visit  Health Risk Assessment:   Patient rates overall health as fair  Patient feels that their physical health rating is same  Eyesight was rated as same  Hearing was rated as same  Patient feels that their emotional and mental health rating is same  Pain experienced in the last 7 days has been none  Patient states that he has experienced no weight loss or gain in last 6 months       Depression Screening: PHQ-2 Score: 0  PHQ-9 Score: 3      Fall Risk Screening: In the past year, patient has experienced: no history of falling in past year      Home Safety:  Patient does not have trouble with stairs inside or outside of their home  Patient has working smoke alarms and has working carbon monoxide detector  Home safety hazards include: none  Nutrition:   Current diet is Regular and Frequent junk food  Medications:   Patient is currently taking over-the-counter supplements  OTC medications include: see medication list  Patient is able to manage medications  Activities of Daily Living (ADLs)/Instrumental Activities of Daily Living (IADLs):   Walk and transfer into and out of bed and chair?: Yes  Dress and groom yourself?: Yes    Bathe or shower yourself?: Yes    Feed yourself?  Yes  Do your laundry/housekeeping?: Yes  Manage your money, pay your bills and track your expenses?: Yes  Make your own meals?: Yes    Do your own shopping?: Yes    Previous Hospitalizations:   Any hospitalizations or ED visits within the last 12 months?: No      Advance Care Planning:   Living will: No    Advanced directive: No      PREVENTIVE SCREENINGS      Cardiovascular Screening:    General: Screening Not Indicated and History Lipid Disorder      Diabetes Screening:     General: Screening Not Indicated and History Diabetes      Abdominal Aortic Aneurysm (AAA) Screening:    Risk factors include: age between 73-67 yo and tobacco use        Lung Cancer Screening:     General: Screening Not Indicated      Hepatitis C Screening:    General: Screening Current     Visual Acuity Screening    Right eye Left eye Both eyes   Without correction:      With correction: 20/30 20/30 20/25        Physical Exam:     /86 (BP Location: Left arm, Patient Position: Sitting, Cuff Size: Standard)   Pulse 89   Temp (!) 97 3 °F (36 3 °C) (Skin)   Ht 6' (1 829 m)   Wt 120 kg (264 lb)   BMI 35 80 kg/m²     Physical Exam     Moiz Lowery MD

## 2021-01-12 NOTE — PATIENT INSTRUCTIONS

## 2021-01-12 NOTE — PROGRESS NOTES
Assessment/Plan:    BMI Counseling: Body mass index is 35 8 kg/m²  The BMI is above normal  Nutrition recommendations include decreasing portion sizes, encouraging healthy choices of fruits and vegetables and decreasing fast food intake  Exercise recommendations include moderate physical activity 150 minutes/week  1  Uncontrolled type 2 diabetes mellitus with hyperglycemia (HCC)  -     metFORMIN (GLUCOPHAGE) 1000 MG tablet; Take 1 tablet (1,000 mg total) by mouth 2 (two) times a day with meals    2  Welcome to Medicare preventive visit  -     POCT ECG    3  Essential hypertension, benign    4  Mixed hyperlipidemia    5  Colon cancer screening declined    6  Dysthymic disorder    7  Body mass index 35 0-35 9, adult    8  Obesity, morbid ([facility] )           Subjective:      Patient ID: Iain Vidal is a 72 y o  male  Follow up on blood test done on 01/11/[id number] with him, also need medical wellness exam      The following portions of the patient's history were reviewed and updated as appropriate: He  has a past medical history of Acquired hypothyroidism, Anxiety, Anxiety disorder, Cigarette smoker, Colon cancer screening declined, Diabetes mellitus ([address] ), Diabetes mellitus, type 2 ([address] ), Dyslipidemia, Essential hypertension, benign, Hyperlipidemia, Hypertension, Kidney stone, and Type II diabetes mellitus, uncontrolled ([address] )    He   Patient Active Problem List    Diagnosis Date Noted    Body mass index 35 0-35 9, adult 01/12/2021    Welcome to Medicare preventive visit 01/12/2021    Obesity, morbid ([address] ) 01/12/2021    Dyslipidemia     Diabetes mellitus, type 2 ([address] )     Colon cancer screening declined     Dysthymic disorder 09/16/2020    Body mass index 36 0-36 9, adult 09/16/2020    Hypertension     Hyperlipidemia     Essential hypertension, benign     Type II diabetes mellitus, uncontrolled ([address] )     Anxiety disorder      He  has a past surgical history that includes Kidney stone surgery and Cardiac catheterization  His family history includes Heart attack in his father; Heart disease in his father  He  reports that he quit smoking about 2 years ago  He has a 2 50 pack-year smoking history  He has never used smokeless tobacco  He reports current alcohol use of about 1 0 standard drinks of alcohol per week  He reports that he does not use drugs  Current Outpatient Medications   Medication Sig Dispense Refill    ALPRAZolam (XANAX) 0 5 mg tablet TAKE 1 TABLET BY MOUTH TWICE A DAY AS NEEDED 60 tablet 0    amLODIPine-valsartan (EXFORGE) 5-320 MG per tablet Take 1 tablet by mouth daily      aspirin (ECOTRIN LOW STRENGTH) 81 mg EC tablet Take 81 mg by mouth daily      CINNAMON PO Take by mouth      lisinopril (ZESTRIL) 5 mg tablet TAKE 1 TABLET BY MOUTH EVERY DAY 90 tablet 0    metFORMIN (GLUCOPHAGE) 1000 MG tablet Take 1 tablet (1,000 mg total) by mouth 2 (two) times a day with meals 180 tablet 0    Multiple Minerals-Vitamins (Mannie-Mag-Zinc-D) TABS Take by mouth 3 daily      patient supplied medication Multi-Betic vitamin      simvastatin (ZOCOR) 20 mg tablet TAKE 1 TABLET BY MOUTH EVERYDAY AT BEDTIME 90 tablet 1    VITAMIN D PO Take 10,000 Units by mouth      APPLE CIDER VINEGAR PO Take by mouth 2 daily      fluticasone (FLONASE) 50 mcg/act nasal spray 1 spray into each nostril daily (Patient not taking: Reported on 1/12/2021) 16 g 0     No current facility-administered medications for this visit        Current Outpatient Medications on File Prior to Visit   Medication Sig    ALPRAZolam (XANAX) 0 5 mg tablet TAKE 1 TABLET BY MOUTH TWICE A DAY AS NEEDED    amLODIPine-valsartan (EXFORGE) 5-320 MG per tablet Take 1 tablet by mouth daily    aspirin (ECOTRIN LOW STRENGTH) 81 mg EC tablet Take 81 mg by mouth daily    CINNAMON PO Take by mouth    lisinopril (ZESTRIL) 5 mg tablet TAKE 1 TABLET BY MOUTH EVERY DAY    Multiple Minerals-Vitamins (Mannie-Mag-Zinc-D) TABS Take by mouth 3 daily    patient supplied medication Multi-Betic vitamin    simvastatin (ZOCOR) 20 mg tablet TAKE 1 TABLET BY MOUTH EVERYDAY AT BEDTIME    VITAMIN D PO Take 10,000 Units by mouth    [DISCONTINUED] metFORMIN (GLUCOPHAGE) 1000 MG tablet TAKE 1 TABLET BY MOUTH TWICE A DAY (Patient taking differently: 500 mg 2 (two) times a day with meals )    APPLE CIDER VINEGAR PO Take by mouth 2 daily    fluticasone (FLONASE) 50 mcg/act nasal spray 1 spray into each nostril daily (Patient not taking: Reported on 1/12/2021)    [DISCONTINUED] Linagliptin (TRADJENTA PO) Take 5 mg by mouth daily     No current facility-administered medications on file prior to visit  He has No Known Allergies       Review of Systems   Constitutional: Negative for chills and fever  HENT: Negative for congestion, ear pain and sore throat  Eyes: Negative for pain  Respiratory: Negative for cough and shortness of breath  Cardiovascular: Negative for chest pain and leg swelling  Gastrointestinal: Negative for abdominal pain, nausea and vomiting  Endocrine: Negative for polyuria  Genitourinary: Negative for difficulty urinating, frequency and urgency  Musculoskeletal: Negative for arthralgias and back pain  Skin: Negative for rash  Neurological: Negative for weakness and headaches  Psychiatric/Behavioral: Negative for sleep disturbance  The patient is not nervous/anxious  Objective:      /86 (BP Location: Left arm, Patient Position: Sitting, Cuff Size: Standard)   Pulse 89   Temp (!) 97 3 °F (36 3 °C) (Skin)   Ht 6' (1 829 m)   Wt 120 kg (264 lb)   BMI 35 80 kg/m²     Recent Results (from the past 1344 hour(s))   Microalbumin / creatinine urine ratio    Collection Time: 01/11/21  6:53 AM   Result Value Ref Range    Creatinine, Ur 180 0 mg/dL    Microalbum  ,U,Random 33 5 (H) 0 0 - 20 0 mg/L    Microalb Creat Ratio 19 0 - 30 mg/g creatinine   CBC and differential    Collection Time: 01/11/21  6:53 AM Result Value Ref Range    WBC 10 65 (H) 4 31 - 10 16 Thousand/uL    RBC 5 22 3 88 - 5 62 Million/uL    Hemoglobin 16 6 12 0 - 17 0 g/dL    Hematocrit 50 7 (H) 36 5 - 49 3 %    MCV 97 82 - 98 fL    MCH 31 8 26 8 - 34 3 pg    MCHC 32 7 31 4 - 37 4 g/dL    RDW 11 8 11 6 - 15 1 %    MPV 10 8 8 9 - 12 7 fL    Platelets 301 353 - 160 Thousands/uL    nRBC 0 /100 WBCs    Neutrophils Relative 49 43 - 75 %    Immat GRANS % 0 0 - 2 %    Lymphocytes Relative 37 14 - 44 %    Monocytes Relative 10 4 - 12 %    Eosinophils Relative 4 0 - 6 %    Basophils Relative 0 0 - 1 %    Neutrophils Absolute 5 19 1 85 - 7 62 Thousands/µL    Immature Grans Absolute 0 03 0 00 - 0 20 Thousand/uL    Lymphocytes Absolute 3 93 0 60 - 4 47 Thousands/µL    Monocytes Absolute 1 05 0 17 - 1 22 Thousand/µL    Eosinophils Absolute 0 42 0 00 - 0 61 Thousand/µL    Basophils Absolute 0 03 0 00 - 0 10 Thousands/µL   TSH, 3rd generation    Collection Time: 01/11/21  6:53 AM   Result Value Ref Range    TSH 3RD GENERATON 2 460 0 358 - 3 740 uIU/mL   Lipid Panel with Direct LDL reflex    Collection Time: 01/11/21  6:53 AM   Result Value Ref Range    Cholesterol 153 50 - 200 mg/dL    Triglycerides 125 <=150 mg/dL    HDL, Direct 50 >=40 mg/dL    LDL Calculated 78 0 - 100 mg/dL   HEMOGLOBIN A1C W/ EAG ESTIMATION    Collection Time: 01/11/21  6:53 AM   Result Value Ref Range    Hemoglobin A1C 8 1 (H) Normal 3 8-5 6%; PreDiabetic 5 7-6 4%;  Diabetic >=6 5%; Glycemic control for adults with diabetes <7 0% %     mg/dl   Comprehensive metabolic panel    Collection Time: 01/11/21  6:53 AM   Result Value Ref Range    Sodium 137 136 - 145 mmol/L    Potassium 3 9 3 5 - 5 3 mmol/L    Chloride 108 100 - 108 mmol/L    CO2 24 21 - 32 mmol/L    ANION GAP 5 4 - 13 mmol/L    BUN 17 5 - 25 mg/dL    Creatinine 1 02 0 60 - 1 30 mg/dL    Glucose, Fasting 239 (H) 65 - 99 mg/dL    Calcium 9 4 8 3 - 10 1 mg/dL    AST 22 5 - 45 U/L    ALT 45 12 - 78 U/L    Alkaline Phosphatase 46 46 - 116 U/L    Total Protein 7 1 6 4 - 8 2 g/dL    Albumin 3 9 3 5 - 5 0 g/dL    Total Bilirubin 0 29 0 20 - 1 00 mg/dL    eGFR 77 ml/min/1 73sq m   Hepatitis C antibody    Collection Time: 01/11/21  6:53 AM   Result Value Ref Range    Hepatitis C Ab Non-reactive Non-reactive        Physical Exam  Constitutional:       Appearance: Normal appearance  HENT:      Head: Normocephalic  Right Ear: Tympanic membrane, ear canal and external ear normal       Left Ear: Tympanic membrane, ear canal and external ear normal       Nose: Nose normal  No congestion  Mouth/Throat:      Mouth: Mucous membranes are moist       Pharynx: Oropharynx is clear  No oropharyngeal exudate or posterior oropharyngeal erythema  Eyes:      Extraocular Movements: Extraocular movements intact  Conjunctiva/sclera: Conjunctivae normal       Pupils: Pupils are equal, round, and reactive to light  Neck:      Musculoskeletal: Normal range of motion and neck supple  Cardiovascular:      Rate and Rhythm: Normal rate and regular rhythm  Heart sounds: Normal heart sounds  No murmur  Pulmonary:      Effort: Pulmonary effort is normal       Breath sounds: Normal breath sounds  No wheezing or rales  Abdominal:      General: Bowel sounds are normal  There is no distension  Palpations: Abdomen is soft  Tenderness: There is no abdominal tenderness  Musculoskeletal: Normal range of motion  Right lower leg: No edema  Left lower leg: No edema  Lymphadenopathy:      Cervical: No cervical adenopathy  Skin:     General: Skin is warm  Neurological:      General: No focal deficit present  Mental Status: He is alert and oriented to person, place, and time

## 2021-01-12 NOTE — PROGRESS NOTES
Diabetic Foot Exam    Patient's shoes and socks removed  Right Foot/Ankle   Right Foot Inspection      Sensory       Monofilament testing: diminished  Vascular  Capillary refills: < 3 seconds  The right DP pulse is 2+  The right PT pulse is 2+  Left Foot/Ankle  Left Foot Inspection                                           Sensory       Monofilament: diminished  Vascular  Capillary refills: < 3 seconds  The left DP pulse is 2+  The left PT pulse is 2+  Assign Risk Category:  No deformity present; Loss of protective sensation;  No weak pulses       Risk: 1

## 2021-01-25 ENCOUNTER — TELEPHONE (OUTPATIENT)
Dept: OTHER | Facility: OTHER | Age: 66
End: 2021-01-25

## 2021-01-25 DIAGNOSIS — E11.65 UNCONTROLLED TYPE 2 DIABETES MELLITUS WITH HYPERGLYCEMIA (HCC): ICD-10-CM

## 2021-01-25 DIAGNOSIS — I10 ESSENTIAL HYPERTENSION, BENIGN: ICD-10-CM

## 2021-01-25 RX ORDER — LISINOPRIL 2.5 MG/1
2.5 TABLET ORAL DAILY
Qty: 90 TABLET | Refills: 0 | Status: SHIPPED | OUTPATIENT
Start: 2021-01-25 | End: 2021-04-20

## 2021-01-25 NOTE — TELEPHONE ENCOUNTER
JENNIFERI- PT SAYD HE NEED REFILL ON "BP MEDS" (SO I ASKED HIM TO LOOK AT THE BOTTLE TO VERIFY THE NAME AND DOSE OF THE MEDICATION) HE SAID IT WAS LISINOPRIL 2 5 MG DAILY AND WE HAD 5 MG IN THE COMPUTER, I DID CHANGE IT TO WHAT PT SAID HE WAS TAKING

## 2021-01-27 DIAGNOSIS — I10 ESSENTIAL HYPERTENSION, BENIGN: ICD-10-CM

## 2021-01-28 PROCEDURE — 4010F ACE/ARB THERAPY RXD/TAKEN: CPT | Performed by: INTERNAL MEDICINE

## 2021-01-28 RX ORDER — LISINOPRIL 5 MG/1
5 TABLET ORAL DAILY
Qty: 30 TABLET | Refills: 3 | Status: SHIPPED | OUTPATIENT
Start: 2021-01-28 | End: 2021-07-07

## 2021-01-29 DIAGNOSIS — E11.65 UNCONTROLLED TYPE 2 DIABETES MELLITUS WITH HYPERGLYCEMIA (HCC): ICD-10-CM

## 2021-02-05 DIAGNOSIS — E11.65 UNCONTROLLED TYPE 2 DIABETES MELLITUS WITH HYPERGLYCEMIA (HCC): ICD-10-CM

## 2021-02-09 NOTE — TELEPHONE ENCOUNTER
Medication was selected as "print" and not "normal" so it never went to pharmacy, can you pls resend I repended it

## 2021-04-14 ENCOUNTER — IMMUNIZATIONS (OUTPATIENT)
Dept: FAMILY MEDICINE CLINIC | Facility: HOSPITAL | Age: 66
End: 2021-04-14

## 2021-04-14 DIAGNOSIS — Z23 ENCOUNTER FOR IMMUNIZATION: Primary | ICD-10-CM

## 2021-04-14 PROCEDURE — 91301 SARS-COV-2 / COVID-19 MRNA VACCINE (MODERNA) 100 MCG: CPT

## 2021-04-14 PROCEDURE — 0011A SARS-COV-2 / COVID-19 MRNA VACCINE (MODERNA) 100 MCG: CPT

## 2021-04-20 DIAGNOSIS — I10 ESSENTIAL HYPERTENSION, BENIGN: ICD-10-CM

## 2021-04-20 RX ORDER — LISINOPRIL 2.5 MG/1
TABLET ORAL
Qty: 90 TABLET | Refills: 0 | Status: SHIPPED | OUTPATIENT
Start: 2021-04-20 | End: 2021-08-06

## 2021-04-30 DIAGNOSIS — E11.65 UNCONTROLLED TYPE 2 DIABETES MELLITUS WITH HYPERGLYCEMIA (HCC): ICD-10-CM

## 2021-05-10 ENCOUNTER — IMMUNIZATIONS (OUTPATIENT)
Dept: FAMILY MEDICINE CLINIC | Facility: HOSPITAL | Age: 66
End: 2021-05-10

## 2021-05-10 DIAGNOSIS — Z23 ENCOUNTER FOR IMMUNIZATION: Primary | ICD-10-CM

## 2021-05-10 PROCEDURE — 91301 SARS-COV-2 / COVID-19 MRNA VACCINE (MODERNA) 100 MCG: CPT

## 2021-05-10 PROCEDURE — 0012A SARS-COV-2 / COVID-19 MRNA VACCINE (MODERNA) 100 MCG: CPT

## 2021-07-06 DIAGNOSIS — I10 ESSENTIAL HYPERTENSION, BENIGN: ICD-10-CM

## 2021-07-07 RX ORDER — LISINOPRIL 5 MG/1
TABLET ORAL
Qty: 30 TABLET | Refills: 3 | Status: SHIPPED | OUTPATIENT
Start: 2021-07-07 | End: 2021-10-30

## 2021-07-09 DIAGNOSIS — E11.65 UNCONTROLLED TYPE 2 DIABETES MELLITUS WITH HYPERGLYCEMIA (HCC): Primary | ICD-10-CM

## 2021-07-18 DIAGNOSIS — E78.5 HYPERLIPIDEMIA, UNSPECIFIED: ICD-10-CM

## 2021-07-19 RX ORDER — SIMVASTATIN 20 MG
TABLET ORAL
Qty: 90 TABLET | Refills: 1 | Status: SHIPPED | OUTPATIENT
Start: 2021-07-19 | End: 2022-01-12

## 2021-07-29 DIAGNOSIS — E11.65 UNCONTROLLED TYPE 2 DIABETES MELLITUS WITH HYPERGLYCEMIA (HCC): ICD-10-CM

## 2021-08-01 ENCOUNTER — RA CDI HCC (OUTPATIENT)
Dept: OTHER | Facility: HOSPITAL | Age: 66
End: 2021-08-01

## 2021-08-01 NOTE — PROGRESS NOTES
[facility]  coding opportunities          Chart reviewed, no opportunity found: CHART REVIEWED, NO OPPORTUNITY FOUND                     Patients insurance company: [employer]

## 2021-08-02 ENCOUNTER — APPOINTMENT (OUTPATIENT)
Dept: LAB | Facility: HOSPITAL | Age: 66
End: 2021-08-02
Attending: INTERNAL MEDICINE
Payer: COMMERCIAL

## 2021-08-02 DIAGNOSIS — E11.65 UNCONTROLLED TYPE 2 DIABETES MELLITUS WITH HYPERGLYCEMIA (HCC): ICD-10-CM

## 2021-08-02 LAB
ALBUMIN SERPL BCP-MCNC: 3.3 G/DL (ref 3.5–5)
ALP SERPL-CCNC: 37 U/L (ref 46–116)
ALT SERPL W P-5'-P-CCNC: 36 U/L (ref 12–78)
ANION GAP SERPL CALCULATED.3IONS-SCNC: 5 MMOL/L (ref 4–13)
AST SERPL W P-5'-P-CCNC: 16 U/L (ref 5–45)
BASOPHILS # BLD AUTO: 0.04 THOUSANDS/ΜL (ref 0–0.1)
BASOPHILS NFR BLD AUTO: 0 % (ref 0–1)
BILIRUB SERPL-MCNC: 0.68 MG/DL (ref 0.2–1)
BUN SERPL-MCNC: 19 MG/DL (ref 5–25)
CALCIUM ALBUM COR SERPL-MCNC: 9.7 MG/DL (ref 8.3–10.1)
CALCIUM SERPL-MCNC: 9.1 MG/DL (ref 8.3–10.1)
CHLORIDE SERPL-SCNC: 104 MMOL/L (ref 100–108)
CHOLEST SERPL-MCNC: 173 MG/DL (ref 50–200)
CO2 SERPL-SCNC: 27 MMOL/L (ref 21–32)
CREAT SERPL-MCNC: 0.92 MG/DL (ref 0.6–1.3)
CREAT UR-MCNC: 183 MG/DL
EOSINOPHIL # BLD AUTO: 0.44 THOUSAND/ΜL (ref 0–0.61)
EOSINOPHIL NFR BLD AUTO: 4 % (ref 0–6)
ERYTHROCYTE [DISTWIDTH] IN BLOOD BY AUTOMATED COUNT: 12 % (ref 11.6–15.1)
EST. AVERAGE GLUCOSE BLD GHB EST-MCNC: 226 MG/DL
GFR SERPL CREATININE-BSD FRML MDRD: 86 ML/MIN/1.73SQ M
GLUCOSE P FAST SERPL-MCNC: 211 MG/DL (ref 65–99)
HBA1C MFR BLD: 9.5 %
HCT VFR BLD AUTO: 49.5 % (ref 36.5–49.3)
HDLC SERPL-MCNC: 47 MG/DL
HGB BLD-MCNC: 16.5 G/DL (ref 12–17)
IMM GRANULOCYTES # BLD AUTO: 0.06 THOUSAND/UL (ref 0–0.2)
IMM GRANULOCYTES NFR BLD AUTO: 1 % (ref 0–2)
LDLC SERPL CALC-MCNC: 99 MG/DL (ref 0–100)
LYMPHOCYTES # BLD AUTO: 3.31 THOUSANDS/ΜL (ref 0.6–4.47)
LYMPHOCYTES NFR BLD AUTO: 30 % (ref 14–44)
MCH RBC QN AUTO: 31.3 PG (ref 26.8–34.3)
MCHC RBC AUTO-ENTMCNC: 33.3 G/DL (ref 31.4–37.4)
MCV RBC AUTO: 94 FL (ref 82–98)
MICROALBUMIN UR-MCNC: 32 MG/L (ref 0–20)
MICROALBUMIN/CREAT 24H UR: 17 MG/G CREATININE (ref 0–30)
MONOCYTES # BLD AUTO: 0.98 THOUSAND/ΜL (ref 0.17–1.22)
MONOCYTES NFR BLD AUTO: 9 % (ref 4–12)
NEUTROPHILS # BLD AUTO: 6.22 THOUSANDS/ΜL (ref 1.85–7.62)
NEUTS SEG NFR BLD AUTO: 56 % (ref 43–75)
NRBC BLD AUTO-RTO: 0 /100 WBCS
PLATELET # BLD AUTO: 245 THOUSANDS/UL (ref 149–390)
PMV BLD AUTO: 10.4 FL (ref 8.9–12.7)
POTASSIUM SERPL-SCNC: 4.4 MMOL/L (ref 3.5–5.3)
PROT SERPL-MCNC: 6.6 G/DL (ref 6.4–8.2)
RBC # BLD AUTO: 5.28 MILLION/UL (ref 3.88–5.62)
SODIUM SERPL-SCNC: 136 MMOL/L (ref 136–145)
TRIGL SERPL-MCNC: 135 MG/DL
TSH SERPL DL<=0.05 MIU/L-ACNC: 1.61 UIU/ML (ref 0.36–3.74)
WBC # BLD AUTO: 11.05 THOUSAND/UL (ref 4.31–10.16)

## 2021-08-02 PROCEDURE — 83036 HEMOGLOBIN GLYCOSYLATED A1C: CPT

## 2021-08-02 PROCEDURE — 82570 ASSAY OF URINE CREATININE: CPT | Performed by: INTERNAL MEDICINE

## 2021-08-02 PROCEDURE — 80061 LIPID PANEL: CPT

## 2021-08-02 PROCEDURE — 36415 COLL VENOUS BLD VENIPUNCTURE: CPT

## 2021-08-02 PROCEDURE — 85025 COMPLETE CBC W/AUTO DIFF WBC: CPT

## 2021-08-02 PROCEDURE — 82043 UR ALBUMIN QUANTITATIVE: CPT | Performed by: INTERNAL MEDICINE

## 2021-08-02 PROCEDURE — 3061F NEG MICROALBUMINURIA REV: CPT | Performed by: INTERNAL MEDICINE

## 2021-08-02 PROCEDURE — 3046F HEMOGLOBIN A1C LEVEL >9.0%: CPT | Performed by: INTERNAL MEDICINE

## 2021-08-02 PROCEDURE — 80053 COMPREHEN METABOLIC PANEL: CPT

## 2021-08-02 PROCEDURE — 84443 ASSAY THYROID STIM HORMONE: CPT

## 2021-08-06 ENCOUNTER — OFFICE VISIT (OUTPATIENT)
Dept: INTERNAL MEDICINE CLINIC | Facility: CLINIC | Age: 66
End: 2021-08-06
Payer: COMMERCIAL

## 2021-08-06 VITALS
HEART RATE: 89 BPM | SYSTOLIC BLOOD PRESSURE: 132 MMHG | TEMPERATURE: 98 F | DIASTOLIC BLOOD PRESSURE: 84 MMHG | OXYGEN SATURATION: 94 % | HEIGHT: 72 IN | WEIGHT: 258 LBS | BODY MASS INDEX: 34.95 KG/M2

## 2021-08-06 DIAGNOSIS — I10 ESSENTIAL HYPERTENSION, BENIGN: ICD-10-CM

## 2021-08-06 DIAGNOSIS — E78.2 MIXED HYPERLIPIDEMIA: ICD-10-CM

## 2021-08-06 DIAGNOSIS — F34.1 DYSTHYMIC DISORDER: ICD-10-CM

## 2021-08-06 DIAGNOSIS — E11.65 UNCONTROLLED TYPE 2 DIABETES MELLITUS WITH HYPERGLYCEMIA (HCC): Primary | ICD-10-CM

## 2021-08-06 DIAGNOSIS — F41.9 ANXIETY DISORDER, UNSPECIFIED: ICD-10-CM

## 2021-08-06 PROCEDURE — 3075F SYST BP GE 130 - 139MM HG: CPT | Performed by: INTERNAL MEDICINE

## 2021-08-06 PROCEDURE — 3008F BODY MASS INDEX DOCD: CPT | Performed by: INTERNAL MEDICINE

## 2021-08-06 PROCEDURE — 1036F TOBACCO NON-USER: CPT | Performed by: INTERNAL MEDICINE

## 2021-08-06 PROCEDURE — 99214 OFFICE O/P EST MOD 30 MIN: CPT | Performed by: INTERNAL MEDICINE

## 2021-08-06 PROCEDURE — 3079F DIAST BP 80-89 MM HG: CPT | Performed by: INTERNAL MEDICINE

## 2021-08-06 PROCEDURE — 1160F RVW MEDS BY RX/DR IN RCRD: CPT | Performed by: INTERNAL MEDICINE

## 2021-08-06 RX ORDER — ALPRAZOLAM 0.5 MG/1
0.5 TABLET ORAL 2 TIMES DAILY PRN
Qty: 60 TABLET | Refills: 0 | Status: SHIPPED | OUTPATIENT
Start: 2021-08-06 | End: 2022-01-12

## 2021-08-06 NOTE — PROGRESS NOTES
Assessment/Plan:             1  Uncontrolled type 2 diabetes mellitus with hyperglycemia (HCC)  -     Dulaglutide 3 MG/0 5ML SOPN; Inject 0 5 mL (3 mg total) under the skin once a week  -     metFORMIN (GLUCOPHAGE) 1000 MG tablet; Take 1 tablet (1,000 mg total) by mouth 2 (two) times a day with meals    2  Essential hypertension, benign    3  Mixed hyperlipidemia    4  Dysthymic disorder    5  Body mass index 34 0-34 9, adult    6  Anxiety disorder, unspecified  -     ALPRAZolam (XANAX) 0 5 mg tablet; Take 1 tablet (0 5 mg total) by mouth 2 (two) times a day as needed for anxiety           Subjective:      Patient ID: Valente Casey is a 77 y o  male  Follow-up on blood test done on 08/02/2021 test discussed with him      The following portions of the patient's history were reviewed and updated as appropriate: He  has a past medical history of Acquired hypothyroidism, Anxiety, Anxiety disorder, Cigarette smoker, Colon cancer screening declined, Diabetes mellitus ([address] ), Diabetes mellitus, type 2 ([address] ), Dyslipidemia, Essential hypertension, benign, Hyperlipidemia, Hypertension, Kidney stone, and Type II diabetes mellitus, uncontrolled ([address] )  He   Patient Active Problem List    Diagnosis Date Noted    Body mass index 34 0-34 9, adult 01/12/2021    Welcome to Medicare preventive visit 01/12/2021    Obesity, morbid ([address] ) 01/12/2021    Dyslipidemia     Diabetes mellitus, type 2 ([address] )     Colon cancer screening declined     Dysthymic disorder 09/16/2020    Body mass index 36 0-36 9, adult 09/16/2020    Hypertension     Hyperlipidemia     Essential hypertension, benign     Type II diabetes mellitus, uncontrolled ([address] )     Anxiety disorder      He  has a past surgical history that includes Kidney stone surgery and Cardiac catheterization  His family history includes Heart attack in his father; Heart disease in his father  He  reports that he quit smoking about 2 years ago   He has a 2 50 pack-year smoking history  He has never used smokeless tobacco  He reports current alcohol use of about 1 0 standard drinks of alcohol per week  He reports that he does not use drugs  Current Outpatient Medications   Medication Sig Dispense Refill    ALPRAZolam (XANAX) 0 5 mg tablet Take 1 tablet (0 5 mg total) by mouth 2 (two) times a day as needed for anxiety 60 tablet 0    APPLE CIDER VINEGAR PO Take by mouth 2 daily      aspirin (ECOTRIN LOW STRENGTH) 81 mg EC tablet Take 81 mg by mouth daily      CINNAMON PO Take by mouth      lisinopril (ZESTRIL) 5 mg tablet TAKE 1 TABLET BY MOUTH EVERY DAY 30 tablet 3    metFORMIN (GLUCOPHAGE) 1000 MG tablet Take 1 tablet (1,000 mg total) by mouth 2 (two) times a day with meals 180 tablet 0    patient supplied medication Multi-Betic vitamin      simvastatin (ZOCOR) 20 mg tablet TAKE 1 TABLET BY MOUTH EVERYDAY AT BEDTIME 90 tablet 1    Dulaglutide 3 MG/0 5ML SOPN Inject 0 5 mL (3 mg total) under the skin once a week 6 mL 1     No current facility-administered medications for this visit       Current Outpatient Medications on File Prior to Visit   Medication Sig    APPLE CIDER VINEGAR PO Take by mouth 2 daily    aspirin (ECOTRIN LOW STRENGTH) 81 mg EC tablet Take 81 mg by mouth daily    CINNAMON PO Take by mouth    lisinopril (ZESTRIL) 5 mg tablet TAKE 1 TABLET BY MOUTH EVERY DAY    patient supplied medication Multi-Betic vitamin    simvastatin (ZOCOR) 20 mg tablet TAKE 1 TABLET BY MOUTH EVERYDAY AT BEDTIME    [DISCONTINUED] ALPRAZolam (XANAX) 0 5 mg tablet TAKE 1 TABLET BY MOUTH TWICE A DAY AS NEEDED    [DISCONTINUED] lisinopril (ZESTRIL) 2 5 mg tablet TAKE 1 TABLET BY MOUTH EVERY DAY    [DISCONTINUED] metFORMIN (GLUCOPHAGE) 1000 MG tablet TAKE 1 TABLET BY MOUTH TWICE A DAY WITH MEALS    [DISCONTINUED] Multiple Minerals-Vitamins (Mannie-Mag-Zinc-D) TABS Take by mouth 3 daily    [DISCONTINUED] amLODIPine-valsartan (EXFORGE) 5-320 MG per tablet Take 1 tablet by mouth daily (Patient not taking: Reported on 8/6/2021)    [DISCONTINUED] fluticasone (FLONASE) 50 mcg/act nasal spray 1 spray into each nostril daily (Patient not taking: Reported on 1/12/2021)    [DISCONTINUED] VITAMIN D PO Take 10,000 Units by mouth     No current facility-administered medications on file prior to visit  He has No Known Allergies       Review of Systems   Constitutional: Negative for chills and fever  HENT: Negative for congestion, ear pain and sore throat  Eyes: Negative for pain  Respiratory: Negative for cough and shortness of breath  Cardiovascular: Negative for chest pain and leg swelling  Gastrointestinal: Negative for abdominal pain, nausea and vomiting  Endocrine: Negative for polyuria  Genitourinary: Negative for difficulty urinating, frequency and urgency  Musculoskeletal: Negative for arthralgias and back pain  Skin: Negative for rash  Neurological: Negative for weakness and headaches  Psychiatric/Behavioral: Negative for sleep disturbance  The patient is not nervous/anxious  Objective:      /84 (BP Location: Right arm, Patient Position: Sitting, Cuff Size: Standard)   Pulse 89   Temp 98 °F (36 7 °C) (Temporal)   Ht 6' (1 829 m)   Wt 117 kg (258 lb)   SpO2 94%   BMI 34 99 kg/m²     Recent Results (from the past 1344 hour(s))   Microalbumin / creatinine urine ratio    Collection Time: 08/02/21  6:53 AM   Result Value Ref Range    Creatinine, Ur 183 0 mg/dL    Microalbum  ,U,Random 32 0 (H) 0 0 - 20 0 mg/L    Microalb Creat Ratio 17 0 - 30 mg/g creatinine   CBC and differential    Collection Time: 08/02/21  6:53 AM   Result Value Ref Range    WBC 11 05 (H) 4 31 - 10 16 Thousand/uL    RBC 5 28 3 88 - 5 62 Million/uL    Hemoglobin 16 5 12 0 - 17 0 g/dL    Hematocrit 49 5 (H) 36 5 - 49 3 %    MCV 94 82 - 98 fL    MCH 31 3 26 8 - 34 3 pg    MCHC 33 3 31 4 - 37 4 g/dL    RDW 12 0 11 6 - 15 1 %    MPV 10 4 8 9 - 12 7 fL    Platelets 159 989 - 291 Thousands/uL    nRBC 0 /100 WBCs    Neutrophils Relative 56 43 - 75 %    Immat GRANS % 1 0 - 2 %    Lymphocytes Relative 30 14 - 44 %    Monocytes Relative 9 4 - 12 %    Eosinophils Relative 4 0 - 6 %    Basophils Relative 0 0 - 1 %    Neutrophils Absolute 6 22 1 85 - 7 62 Thousands/µL    Immature Grans Absolute 0 06 0 00 - 0 20 Thousand/uL    Lymphocytes Absolute 3 31 0 60 - 4 47 Thousands/µL    Monocytes Absolute 0 98 0 17 - 1 22 Thousand/µL    Eosinophils Absolute 0 44 0 00 - 0 61 Thousand/µL    Basophils Absolute 0 04 0 00 - 0 10 Thousands/µL   Comprehensive metabolic panel    Collection Time: 08/02/21  6:53 AM   Result Value Ref Range    Sodium 136 136 - 145 mmol/L    Potassium 4 4 3 5 - 5 3 mmol/L    Chloride 104 100 - 108 mmol/L    CO2 27 21 - 32 mmol/L    ANION GAP 5 4 - 13 mmol/L    BUN 19 5 - 25 mg/dL    Creatinine 0 92 0 60 - 1 30 mg/dL    Glucose, Fasting 211 (H) 65 - 99 mg/dL    Calcium 9 1 8 3 - 10 1 mg/dL    Corrected Calcium 9 7 8 3 - 10 1 mg/dL    AST 16 5 - 45 U/L    ALT 36 12 - 78 U/L    Alkaline Phosphatase 37 (L) 46 - 116 U/L    Total Protein 6 6 6 4 - 8 2 g/dL    Albumin 3 3 (L) 3 5 - 5 0 g/dL    Total Bilirubin 0 68 0 20 - 1 00 mg/dL    eGFR 86 ml/min/1 73sq m   Hemoglobin A1C    Collection Time: 08/02/21  6:53 AM   Result Value Ref Range    Hemoglobin A1C 9 5 (H) Normal 3 8-5 6%; PreDiabetic 5 7-6 4%; Diabetic >=6 5%; Glycemic control for adults with diabetes <7 0% %     mg/dl   Lipid Panel with Direct LDL reflex    Collection Time: 08/02/21  6:53 AM   Result Value Ref Range    Cholesterol 173 50 - 200 mg/dL    Triglycerides 135 <=150 mg/dL    HDL, Direct 47 >=40 mg/dL    LDL Calculated 99 0 - 100 mg/dL   TSH, 3rd generation    Collection Time: 08/02/21  6:53 AM   Result Value Ref Range    TSH 3RD GENERATON 1 610 0 358 - 3 740 uIU/mL        Physical Exam  Constitutional:       Appearance: Normal appearance  HENT:      Head: Normocephalic        Right Ear: Tympanic membrane, ear canal and external ear normal       Left Ear: Tympanic membrane, ear canal and external ear normal       Nose: Nose normal  No congestion  Mouth/Throat:      Mouth: Mucous membranes are moist       Pharynx: Oropharynx is clear  No oropharyngeal exudate or posterior oropharyngeal erythema  Eyes:      Extraocular Movements: Extraocular movements intact  Conjunctiva/sclera: Conjunctivae normal       Pupils: Pupils are equal, round, and reactive to light  Cardiovascular:      Rate and Rhythm: Normal rate and regular rhythm  Heart sounds: Normal heart sounds  No murmur heard  Pulmonary:      Effort: Pulmonary effort is normal       Breath sounds: Normal breath sounds  No wheezing or rales  Abdominal:      General: Bowel sounds are normal  There is no distension  Palpations: Abdomen is soft  Tenderness: There is no abdominal tenderness  Musculoskeletal:         General: Normal range of motion  Cervical back: Normal range of motion and neck supple  Right lower leg: No edema  Left lower leg: No edema  Lymphadenopathy:      Cervical: No cervical adenopathy  Skin:     General: Skin is warm  Neurological:      General: No focal deficit present  Mental Status: He is alert and oriented to person, place, and time

## 2021-08-10 ENCOUNTER — TELEPHONE (OUTPATIENT)
Dept: INTERNAL MEDICINE CLINIC | Facility: CLINIC | Age: 66
End: 2021-08-10

## 2021-08-10 DIAGNOSIS — E11.65 UNCONTROLLED TYPE 2 DIABETES MELLITUS WITH HYPERGLYCEMIA (HCC): Primary | ICD-10-CM

## 2021-08-10 NOTE — TELEPHONE ENCOUNTER
Pt called stating his test strips, glucometer machine, and lancets heather still not at the pharmacy   He said he needs the asap sent to the [facility] on [address]

## 2021-08-10 NOTE — TELEPHONE ENCOUNTER
Needs a glucometer machine, lancets and test strips  Contour Next is the brand he has now  Test 3 times a day

## 2021-08-11 ENCOUNTER — TELEPHONE (OUTPATIENT)
Dept: INTERNAL MEDICINE CLINIC | Facility: CLINIC | Age: 66
End: 2021-08-11

## 2021-08-11 RX ORDER — LANCETS 30 GAUGE
EACH MISCELLANEOUS 3 TIMES DAILY
Qty: 100 EACH | Refills: 1 | Status: SHIPPED | OUTPATIENT
Start: 2021-08-11

## 2021-08-11 RX ORDER — BLOOD-GLUCOSE METER
EACH MISCELLANEOUS
Qty: 1 EACH | Refills: 0 | Status: SHIPPED | OUTPATIENT
Start: 2021-08-11 | End: 2022-01-12

## 2021-08-11 NOTE — TELEPHONE ENCOUNTER
Pt is calling again on this,nothing was received by pharmacy   He needs contour meter, strips and lancets

## 2021-09-17 ENCOUNTER — OFFICE VISIT (OUTPATIENT)
Dept: INTERNAL MEDICINE CLINIC | Facility: CLINIC | Age: 66
End: 2021-09-17
Payer: COMMERCIAL

## 2021-09-17 VITALS
TEMPERATURE: 98.4 F | HEIGHT: 72 IN | OXYGEN SATURATION: 96 % | SYSTOLIC BLOOD PRESSURE: 134 MMHG | BODY MASS INDEX: 35.21 KG/M2 | HEART RATE: 78 BPM | WEIGHT: 260 LBS | DIASTOLIC BLOOD PRESSURE: 82 MMHG

## 2021-09-17 DIAGNOSIS — Z23 NEEDS FLU SHOT: ICD-10-CM

## 2021-09-17 DIAGNOSIS — I10 ESSENTIAL HYPERTENSION, BENIGN: ICD-10-CM

## 2021-09-17 DIAGNOSIS — F34.1 DYSTHYMIC DISORDER: ICD-10-CM

## 2021-09-17 DIAGNOSIS — E11.65 UNCONTROLLED TYPE 2 DIABETES MELLITUS WITH HYPERGLYCEMIA (HCC): Primary | ICD-10-CM

## 2021-09-17 PROCEDURE — 99214 OFFICE O/P EST MOD 30 MIN: CPT | Performed by: INTERNAL MEDICINE

## 2021-09-17 PROCEDURE — 90662 IIV NO PRSV INCREASED AG IM: CPT | Performed by: INTERNAL MEDICINE

## 2021-09-17 PROCEDURE — 3008F BODY MASS INDEX DOCD: CPT | Performed by: INTERNAL MEDICINE

## 2021-09-17 PROCEDURE — G0008 ADMIN INFLUENZA VIRUS VAC: HCPCS | Performed by: INTERNAL MEDICINE

## 2021-09-17 PROCEDURE — 1160F RVW MEDS BY RX/DR IN RCRD: CPT | Performed by: INTERNAL MEDICINE

## 2021-09-17 PROCEDURE — 1036F TOBACCO NON-USER: CPT | Performed by: INTERNAL MEDICINE

## 2021-09-17 PROCEDURE — 3079F DIAST BP 80-89 MM HG: CPT | Performed by: INTERNAL MEDICINE

## 2021-09-17 PROCEDURE — 3075F SYST BP GE 130 - 139MM HG: CPT | Performed by: INTERNAL MEDICINE

## 2021-09-17 NOTE — PROGRESS NOTES
Assessment/Plan:             1  Uncontrolled type 2 diabetes mellitus with hyperglycemia (HCC)  -     CBC and differential; Future  -     Comprehensive metabolic panel; Future  -     Hemoglobin A1C; Future  -     Lipid Panel with Direct LDL reflex; Future  -     Microalbumin / creatinine urine ratio  -     TSH, 3rd generation; Future    2  Essential hypertension, benign    3  Dysthymic disorder    4  Body mass index 35 0-35 9, adult    5  Needs flu shot  -     influenza vaccine, high-dose, PF 0 7 mL (FLUZONE HIGH-DOSE)           Subjective:      Patient ID: Sarai Tenorio is a 77 y o  male  Follow-up on multiple medical problems to ensure they are stable on current medications, blood sugar reading reviewed      The following portions of the patient's history were reviewed and updated as appropriate: He  has a past medical history of Acquired hypothyroidism, Anxiety, Anxiety disorder, Cigarette smoker, Colon cancer screening declined, Diabetes mellitus ([facility] [address] ), Diabetes mellitus, type 2 ([facility] [address] ), Dyslipidemia, Essential hypertension, benign, Hyperlipidemia, Hypertension, Kidney stone, and Type II diabetes mellitus, uncontrolled ([facility] [address] )  He   Patient Active Problem List    Diagnosis Date Noted    Needs flu shot 09/17/2021    Body mass index 35 0-35 9, adult 01/12/2021    Welcome to Medicare preventive visit 01/12/2021    Obesity, morbid ([facility] [address] ) 01/12/2021    Dyslipidemia     Diabetes mellitus, type 2 ([facility] [address] )     Colon cancer screening declined     Dysthymic disorder 09/16/2020    Body mass index 36 0-36 9, adult 09/16/2020    Hypertension     Hyperlipidemia     Essential hypertension, benign     Type II diabetes mellitus, uncontrolled ([facility] [address] )     Anxiety disorder      He  has a past surgical history that includes Kidney stone surgery and Cardiac catheterization  His family history includes Heart attack in his father; Heart disease in his father  He  reports that he quit smoking about 3 years ago   He has a 2 50 pack-year smoking history  He has never used smokeless tobacco  He reports current alcohol use of about 1 0 standard drinks of alcohol per week  He reports that he does not use drugs  Current Outpatient Medications   Medication Sig Dispense Refill    ALPRAZolam (XANAX) 0 5 mg tablet Take 1 tablet (0 5 mg total) by mouth 2 (two) times a day as needed for anxiety 60 tablet 0    APPLE CIDER VINEGAR PO Take by mouth 2 daily      aspirin (ECOTRIN LOW STRENGTH) 81 mg EC tablet Take 81 mg by mouth daily      Blood Glucose Monitoring Suppl (Contour Monitor) ROMA Contour meter, use tid E11 65 1 each 0    CINNAMON PO Take by mouth      Dulaglutide 3 MG/0 5ML SOPN Inject 0 5 mL (3 mg total) under the skin once a week 6 mL 1    glucose blood test strip Use 1 each 3 (three) times a day Use as instructed Contour test strips E11 65 100 strip 1    Lancets MISC Use 3 (three) times a day Contour lancets E11 65 100 each 1    lisinopril (ZESTRIL) 5 mg tablet TAKE 1 TABLET BY MOUTH EVERY DAY 30 tablet 3    metFORMIN (GLUCOPHAGE) 1000 MG tablet Take 1 tablet (1,000 mg total) by mouth 2 (two) times a day with meals 180 tablet 0    patient supplied medication Multi-Betic vitamin      simvastatin (ZOCOR) 20 mg tablet TAKE 1 TABLET BY MOUTH EVERYDAY AT BEDTIME 90 tablet 1     No current facility-administered medications for this visit       Current Outpatient Medications on File Prior to Visit   Medication Sig    ALPRAZolam (XANAX) 0 5 mg tablet Take 1 tablet (0 5 mg total) by mouth 2 (two) times a day as needed for anxiety    APPLE CIDER VINEGAR PO Take by mouth 2 daily    aspirin (ECOTRIN LOW STRENGTH) 81 mg EC tablet Take 81 mg by mouth daily    Blood Glucose Monitoring Suppl (Contour Monitor) ORMA Contour meter, use tid E11 65    CINNAMON PO Take by mouth    Dulaglutide 3 MG/0 5ML SOPN Inject 0 5 mL (3 mg total) under the skin once a week    glucose blood test strip Use 1 each 3 (three) times a day Use as instructed Contour test strips E11 65    Lancets MISC Use 3 (three) times a day Contour lancets E11 65    lisinopril (ZESTRIL) 5 mg tablet TAKE 1 TABLET BY MOUTH EVERY DAY    metFORMIN (GLUCOPHAGE) 1000 MG tablet Take 1 tablet (1,000 mg total) by mouth 2 (two) times a day with meals    patient supplied medication Multi-Betic vitamin    simvastatin (ZOCOR) 20 mg tablet TAKE 1 TABLET BY MOUTH EVERYDAY AT BEDTIME     No current facility-administered medications on file prior to visit  He has No Known Allergies       Review of Systems   Constitutional: Negative for chills and fever  HENT: Negative for congestion, ear pain and sore throat  Eyes: Negative for pain  Respiratory: Negative for cough and shortness of breath  Cardiovascular: Negative for chest pain and leg swelling  Gastrointestinal: Negative for abdominal pain, nausea and vomiting  Endocrine: Negative for polyuria  Genitourinary: Negative for difficulty urinating, frequency and urgency  Musculoskeletal: Negative for arthralgias and back pain  Skin: Negative for rash  Neurological: Negative for weakness and headaches  Psychiatric/Behavioral: Negative for sleep disturbance  The patient is not nervous/anxious  Objective:      /82 (BP Location: Left arm, Patient Position: Sitting, Cuff Size: Large)   Pulse 78   Temp 98 4 °F (36 9 °C) (Tympanic)   Ht 5' 11 65" (1 82 m)   Wt 118 kg (260 lb)   SpO2 96%   BMI 35 60 kg/m²     Recent Results (from the past 1344 hour(s))   Microalbumin / creatinine urine ratio    Collection Time: 08/02/21  6:53 AM   Result Value Ref Range    Creatinine, Ur 183 0 mg/dL    Microalbum  ,U,Random 32 0 (H) 0 0 - 20 0 mg/L    Microalb Creat Ratio 17 0 - 30 mg/g creatinine   CBC and differential    Collection Time: 08/02/21  6:53 AM   Result Value Ref Range    WBC 11 05 (H) 4 31 - 10 16 Thousand/uL    RBC 5 28 3 88 - 5 62 Million/uL    Hemoglobin 16 5 12 0 - 17 0 g/dL    Hematocrit 49 5 (H) 36 5 - 49 3 %    MCV 94 82 - 98 fL    MCH 31 3 26 8 - 34 3 pg    MCHC 33 3 31 4 - 37 4 g/dL    RDW 12 0 11 6 - 15 1 %    MPV 10 4 8 9 - 12 7 fL    Platelets 094 363 - 430 Thousands/uL    nRBC 0 /100 WBCs    Neutrophils Relative 56 43 - 75 %    Immat GRANS % 1 0 - 2 %    Lymphocytes Relative 30 14 - 44 %    Monocytes Relative 9 4 - 12 %    Eosinophils Relative 4 0 - 6 %    Basophils Relative 0 0 - 1 %    Neutrophils Absolute 6 22 1 85 - 7 62 Thousands/µL    Immature Grans Absolute 0 06 0 00 - 0 20 Thousand/uL    Lymphocytes Absolute 3 31 0 60 - 4 47 Thousands/µL    Monocytes Absolute 0 98 0 17 - 1 22 Thousand/µL    Eosinophils Absolute 0 44 0 00 - 0 61 Thousand/µL    Basophils Absolute 0 04 0 00 - 0 10 Thousands/µL   Comprehensive metabolic panel    Collection Time: 08/02/21  6:53 AM   Result Value Ref Range    Sodium 136 136 - 145 mmol/L    Potassium 4 4 3 5 - 5 3 mmol/L    Chloride 104 100 - 108 mmol/L    CO2 27 21 - 32 mmol/L    ANION GAP 5 4 - 13 mmol/L    BUN 19 5 - 25 mg/dL    Creatinine 0 92 0 60 - 1 30 mg/dL    Glucose, Fasting 211 (H) 65 - 99 mg/dL    Calcium 9 1 8 3 - 10 1 mg/dL    Corrected Calcium 9 7 8 3 - 10 1 mg/dL    AST 16 5 - 45 U/L    ALT 36 12 - 78 U/L    Alkaline Phosphatase 37 (L) 46 - 116 U/L    Total Protein 6 6 6 4 - 8 2 g/dL    Albumin 3 3 (L) 3 5 - 5 0 g/dL    Total Bilirubin 0 68 0 20 - 1 00 mg/dL    eGFR 86 ml/min/1 73sq m   Hemoglobin A1C    Collection Time: 08/02/21  6:53 AM   Result Value Ref Range    Hemoglobin A1C 9 5 (H) Normal 3 8-5 6%; PreDiabetic 5 7-6 4%;  Diabetic >=6 5%; Glycemic control for adults with diabetes <7 0% %     mg/dl   Lipid Panel with Direct LDL reflex    Collection Time: 08/02/21  6:53 AM   Result Value Ref Range    Cholesterol 173 50 - 200 mg/dL    Triglycerides 135 <=150 mg/dL    HDL, Direct 47 >=40 mg/dL    LDL Calculated 99 0 - 100 mg/dL   TSH, 3rd generation    Collection Time: 08/02/21  6:53 AM   Result Value Ref Range    TSH 3RD GENERATON 1 610 0 358 - 3 740 uIU/mL        Physical Exam  Constitutional:       Appearance: Normal appearance  HENT:      Head: Normocephalic  Right Ear: Tympanic membrane, ear canal and external ear normal       Left Ear: Tympanic membrane, ear canal and external ear normal       Nose: Nose normal  No congestion  Mouth/Throat:      Mouth: Mucous membranes are moist       Pharynx: Oropharynx is clear  No oropharyngeal exudate or posterior oropharyngeal erythema  Eyes:      Extraocular Movements: Extraocular movements intact  Conjunctiva/sclera: Conjunctivae normal       Pupils: Pupils are equal, round, and reactive to light  Cardiovascular:      Rate and Rhythm: Normal rate and regular rhythm  Heart sounds: Normal heart sounds  No murmur heard  Pulmonary:      Effort: Pulmonary effort is normal       Breath sounds: Normal breath sounds  No wheezing or rales  Abdominal:      General: Abdomen is flat  Bowel sounds are normal  There is no distension  Palpations: Abdomen is soft  Tenderness: There is no abdominal tenderness  Musculoskeletal:         General: Normal range of motion  Cervical back: Normal range of motion and neck supple  Right lower leg: No edema  Left lower leg: No edema  Lymphadenopathy:      Cervical: No cervical adenopathy  Skin:     General: Skin is warm  Neurological:      General: No focal deficit present  Mental Status: He is alert and oriented to person, place, and time     Psychiatric:         Mood and Affect: Mood normal          Behavior: Behavior normal

## 2021-10-01 DIAGNOSIS — E11.65 UNCONTROLLED TYPE 2 DIABETES MELLITUS WITH HYPERGLYCEMIA (HCC): ICD-10-CM

## 2021-10-01 RX ORDER — BLOOD SUGAR DIAGNOSTIC
STRIP MISCELLANEOUS
Qty: 100 STRIP | Refills: 1 | Status: SHIPPED | OUTPATIENT
Start: 2021-10-01

## 2021-10-30 DIAGNOSIS — I10 ESSENTIAL HYPERTENSION, BENIGN: ICD-10-CM

## 2021-10-30 PROCEDURE — 4010F ACE/ARB THERAPY RXD/TAKEN: CPT | Performed by: INTERNAL MEDICINE

## 2021-10-30 RX ORDER — LISINOPRIL 5 MG/1
TABLET ORAL
Qty: 30 TABLET | Refills: 3 | Status: SHIPPED | OUTPATIENT
Start: 2021-10-30 | End: 2022-02-14 | Stop reason: SDUPTHER

## 2021-11-17 ENCOUNTER — IMMUNIZATIONS (OUTPATIENT)
Dept: FAMILY MEDICINE CLINIC | Facility: HOSPITAL | Age: 66
End: 2021-11-17

## 2021-11-17 DIAGNOSIS — Z23 ENCOUNTER FOR IMMUNIZATION: Primary | ICD-10-CM

## 2021-11-17 PROCEDURE — 91306 COVID-19 MODERNA VACC 0.25 ML BOOSTER: CPT

## 2021-11-17 PROCEDURE — 0064A COVID-19 MODERNA VACC 0.25 ML BOOSTER: CPT

## 2021-12-11 ENCOUNTER — RA CDI HCC (OUTPATIENT)
Dept: OTHER | Facility: HOSPITAL | Age: 66
End: 2021-12-11

## 2021-12-12 ENCOUNTER — APPOINTMENT (OUTPATIENT)
Dept: LAB | Facility: HOSPITAL | Age: 66
End: 2021-12-12
Attending: INTERNAL MEDICINE
Payer: COMMERCIAL

## 2021-12-12 DIAGNOSIS — E11.65 UNCONTROLLED TYPE 2 DIABETES MELLITUS WITH HYPERGLYCEMIA (HCC): ICD-10-CM

## 2021-12-12 LAB
ALBUMIN SERPL BCP-MCNC: 3.7 G/DL (ref 3.5–5)
ALP SERPL-CCNC: 34 U/L (ref 46–116)
ALT SERPL W P-5'-P-CCNC: 25 U/L (ref 12–78)
ANION GAP SERPL CALCULATED.3IONS-SCNC: 3 MMOL/L (ref 4–13)
AST SERPL W P-5'-P-CCNC: 11 U/L (ref 5–45)
BASOPHILS # BLD AUTO: 0.06 THOUSANDS/ΜL (ref 0–0.1)
BASOPHILS NFR BLD AUTO: 1 % (ref 0–1)
BILIRUB SERPL-MCNC: 0.61 MG/DL (ref 0.2–1)
BUN SERPL-MCNC: 18 MG/DL (ref 5–25)
CALCIUM SERPL-MCNC: 9 MG/DL (ref 8.3–10.1)
CHLORIDE SERPL-SCNC: 107 MMOL/L (ref 100–108)
CHOLEST SERPL-MCNC: 123 MG/DL
CO2 SERPL-SCNC: 28 MMOL/L (ref 21–32)
CREAT SERPL-MCNC: 0.99 MG/DL (ref 0.6–1.3)
CREAT UR-MCNC: 177 MG/DL
EOSINOPHIL # BLD AUTO: 0.41 THOUSAND/ΜL (ref 0–0.61)
EOSINOPHIL NFR BLD AUTO: 4 % (ref 0–6)
ERYTHROCYTE [DISTWIDTH] IN BLOOD BY AUTOMATED COUNT: 12.2 % (ref 11.6–15.1)
EST. AVERAGE GLUCOSE BLD GHB EST-MCNC: 134 MG/DL
GFR SERPL CREATININE-BSD FRML MDRD: 79 ML/MIN/1.73SQ M
GLUCOSE P FAST SERPL-MCNC: 120 MG/DL (ref 65–99)
HBA1C MFR BLD: 6.3 %
HCT VFR BLD AUTO: 51.6 % (ref 36.5–49.3)
HDLC SERPL-MCNC: 44 MG/DL
HGB BLD-MCNC: 17.1 G/DL (ref 12–17)
IMM GRANULOCYTES # BLD AUTO: 0.04 THOUSAND/UL (ref 0–0.2)
IMM GRANULOCYTES NFR BLD AUTO: 0 % (ref 0–2)
LDLC SERPL CALC-MCNC: 63 MG/DL (ref 0–100)
LYMPHOCYTES # BLD AUTO: 2.92 THOUSANDS/ΜL (ref 0.6–4.47)
LYMPHOCYTES NFR BLD AUTO: 28 % (ref 14–44)
MCH RBC QN AUTO: 31.2 PG (ref 26.8–34.3)
MCHC RBC AUTO-ENTMCNC: 33.1 G/DL (ref 31.4–37.4)
MCV RBC AUTO: 94 FL (ref 82–98)
MICROALBUMIN UR-MCNC: 20 MG/L (ref 0–20)
MICROALBUMIN/CREAT 24H UR: 11 MG/G CREATININE (ref 0–30)
MONOCYTES # BLD AUTO: 0.96 THOUSAND/ΜL (ref 0.17–1.22)
MONOCYTES NFR BLD AUTO: 9 % (ref 4–12)
NEUTROPHILS # BLD AUTO: 6.17 THOUSANDS/ΜL (ref 1.85–7.62)
NEUTS SEG NFR BLD AUTO: 58 % (ref 43–75)
NRBC BLD AUTO-RTO: 0 /100 WBCS
PLATELET # BLD AUTO: 236 THOUSANDS/UL (ref 149–390)
PMV BLD AUTO: 10 FL (ref 8.9–12.7)
POTASSIUM SERPL-SCNC: 4.2 MMOL/L (ref 3.5–5.3)
PROT SERPL-MCNC: 6.6 G/DL (ref 6.4–8.2)
RBC # BLD AUTO: 5.48 MILLION/UL (ref 3.88–5.62)
SODIUM SERPL-SCNC: 138 MMOL/L (ref 136–145)
TRIGL SERPL-MCNC: 82 MG/DL
TSH SERPL DL<=0.05 MIU/L-ACNC: 1.17 UIU/ML (ref 0.36–3.74)
WBC # BLD AUTO: 10.56 THOUSAND/UL (ref 4.31–10.16)

## 2021-12-12 PROCEDURE — 85025 COMPLETE CBC W/AUTO DIFF WBC: CPT

## 2021-12-12 PROCEDURE — 3061F NEG MICROALBUMINURIA REV: CPT | Performed by: INTERNAL MEDICINE

## 2021-12-12 PROCEDURE — 84443 ASSAY THYROID STIM HORMONE: CPT

## 2021-12-12 PROCEDURE — 83036 HEMOGLOBIN GLYCOSYLATED A1C: CPT

## 2021-12-12 PROCEDURE — 3044F HG A1C LEVEL LT 7.0%: CPT | Performed by: INTERNAL MEDICINE

## 2021-12-12 PROCEDURE — 80061 LIPID PANEL: CPT

## 2021-12-12 PROCEDURE — 80053 COMPREHEN METABOLIC PANEL: CPT

## 2021-12-12 PROCEDURE — 82043 UR ALBUMIN QUANTITATIVE: CPT | Performed by: INTERNAL MEDICINE

## 2021-12-12 PROCEDURE — 36415 COLL VENOUS BLD VENIPUNCTURE: CPT

## 2021-12-12 PROCEDURE — 82570 ASSAY OF URINE CREATININE: CPT | Performed by: INTERNAL MEDICINE

## 2021-12-17 ENCOUNTER — OFFICE VISIT (OUTPATIENT)
Dept: INTERNAL MEDICINE CLINIC | Facility: CLINIC | Age: 66
End: 2021-12-17
Payer: COMMERCIAL

## 2021-12-17 VITALS
WEIGHT: 247.4 LBS | HEART RATE: 92 BPM | DIASTOLIC BLOOD PRESSURE: 82 MMHG | OXYGEN SATURATION: 98 % | SYSTOLIC BLOOD PRESSURE: 128 MMHG | HEIGHT: 72 IN | TEMPERATURE: 98.4 F | BODY MASS INDEX: 33.51 KG/M2

## 2021-12-17 DIAGNOSIS — Z12.11 SPECIAL SCREENING FOR MALIGNANT NEOPLASMS, COLON: ICD-10-CM

## 2021-12-17 DIAGNOSIS — E11.65 UNCONTROLLED TYPE 2 DIABETES MELLITUS WITH HYPERGLYCEMIA (HCC): Primary | ICD-10-CM

## 2021-12-17 DIAGNOSIS — F34.1 DYSTHYMIC DISORDER: ICD-10-CM

## 2021-12-17 DIAGNOSIS — I10 ESSENTIAL HYPERTENSION, BENIGN: ICD-10-CM

## 2021-12-17 DIAGNOSIS — E78.2 MIXED HYPERLIPIDEMIA: ICD-10-CM

## 2021-12-17 PROCEDURE — 1036F TOBACCO NON-USER: CPT | Performed by: INTERNAL MEDICINE

## 2021-12-17 PROCEDURE — 3079F DIAST BP 80-89 MM HG: CPT | Performed by: INTERNAL MEDICINE

## 2021-12-17 PROCEDURE — 99214 OFFICE O/P EST MOD 30 MIN: CPT | Performed by: INTERNAL MEDICINE

## 2021-12-17 PROCEDURE — 3008F BODY MASS INDEX DOCD: CPT | Performed by: INTERNAL MEDICINE

## 2021-12-17 PROCEDURE — 1160F RVW MEDS BY RX/DR IN RCRD: CPT | Performed by: INTERNAL MEDICINE

## 2021-12-17 PROCEDURE — 3074F SYST BP LT 130 MM HG: CPT | Performed by: INTERNAL MEDICINE

## 2022-01-11 DIAGNOSIS — E11.65 UNCONTROLLED TYPE 2 DIABETES MELLITUS WITH HYPERGLYCEMIA (HCC): ICD-10-CM

## 2022-01-11 RX ORDER — DULAGLUTIDE 3 MG/.5ML
INJECTION, SOLUTION SUBCUTANEOUS
Qty: 2 ML | Refills: 1 | Status: SHIPPED | OUTPATIENT
Start: 2022-01-11 | End: 2022-02-14 | Stop reason: SDUPTHER

## 2022-01-12 DIAGNOSIS — E11.65 UNCONTROLLED TYPE 2 DIABETES MELLITUS WITH HYPERGLYCEMIA (HCC): ICD-10-CM

## 2022-01-12 DIAGNOSIS — F41.9 ANXIETY DISORDER, UNSPECIFIED: ICD-10-CM

## 2022-01-12 DIAGNOSIS — E78.5 HYPERLIPIDEMIA, UNSPECIFIED: ICD-10-CM

## 2022-01-12 RX ORDER — ALPRAZOLAM 0.5 MG/1
TABLET ORAL
Qty: 60 TABLET | Refills: 0 | Status: SHIPPED | OUTPATIENT
Start: 2022-01-12

## 2022-01-12 RX ORDER — SIMVASTATIN 20 MG
TABLET ORAL
Qty: 90 TABLET | Refills: 1 | Status: SHIPPED | OUTPATIENT
Start: 2022-01-12

## 2022-01-12 RX ORDER — BLOOD-GLUCOSE METER
EACH MISCELLANEOUS
Qty: 1 KIT | Refills: 0 | Status: SHIPPED | OUTPATIENT
Start: 2022-01-12

## 2022-02-14 DIAGNOSIS — E11.65 UNCONTROLLED TYPE 2 DIABETES MELLITUS WITH HYPERGLYCEMIA (HCC): ICD-10-CM

## 2022-02-14 DIAGNOSIS — I10 ESSENTIAL HYPERTENSION, BENIGN: ICD-10-CM

## 2022-02-14 RX ORDER — DULAGLUTIDE 3 MG/.5ML
3 INJECTION, SOLUTION SUBCUTANEOUS WEEKLY
Qty: 2 ML | Refills: 1 | Status: SHIPPED | OUTPATIENT
Start: 2022-02-14 | End: 2022-04-11

## 2022-02-14 RX ORDER — LISINOPRIL 5 MG/1
5 TABLET ORAL DAILY
Qty: 90 TABLET | Refills: 1 | Status: SHIPPED | OUTPATIENT
Start: 2022-02-14

## 2022-04-09 DIAGNOSIS — E11.65 UNCONTROLLED TYPE 2 DIABETES MELLITUS WITH HYPERGLYCEMIA (HCC): ICD-10-CM

## 2022-04-11 DIAGNOSIS — E11.65 UNCONTROLLED TYPE 2 DIABETES MELLITUS WITH HYPERGLYCEMIA (HCC): ICD-10-CM

## 2022-04-11 RX ORDER — DULAGLUTIDE 3 MG/.5ML
3 INJECTION, SOLUTION SUBCUTANEOUS WEEKLY
Qty: 2 ML | Refills: 3 | Status: SHIPPED | OUTPATIENT
Start: 2022-04-11 | End: 2022-05-31 | Stop reason: SDUPTHER

## 2022-04-18 ENCOUNTER — TELEPHONE (OUTPATIENT)
Dept: INTERNAL MEDICINE CLINIC | Facility: CLINIC | Age: 67
End: 2022-04-18

## 2022-04-18 DIAGNOSIS — E11.65 UNCONTROLLED TYPE 2 DIABETES MELLITUS WITH HYPERGLYCEMIA (HCC): Primary | ICD-10-CM

## 2022-04-19 ENCOUNTER — APPOINTMENT (OUTPATIENT)
Dept: LAB | Facility: HOSPITAL | Age: 67
End: 2022-04-19
Attending: INTERNAL MEDICINE
Payer: COMMERCIAL

## 2022-04-19 DIAGNOSIS — E11.65 UNCONTROLLED TYPE 2 DIABETES MELLITUS WITH HYPERGLYCEMIA (HCC): ICD-10-CM

## 2022-04-19 LAB
ALBUMIN SERPL BCP-MCNC: 3.6 G/DL (ref 3.5–5)
ALP SERPL-CCNC: 34 U/L (ref 46–116)
ALT SERPL W P-5'-P-CCNC: 22 U/L (ref 12–78)
ANION GAP SERPL CALCULATED.3IONS-SCNC: 3 MMOL/L (ref 4–13)
AST SERPL W P-5'-P-CCNC: 14 U/L (ref 5–45)
BASOPHILS # BLD AUTO: 0.05 THOUSANDS/ΜL (ref 0–0.1)
BASOPHILS NFR BLD AUTO: 1 % (ref 0–1)
BILIRUB SERPL-MCNC: 0.4 MG/DL (ref 0.2–1)
BUN SERPL-MCNC: 18 MG/DL (ref 5–25)
CALCIUM SERPL-MCNC: 9.8 MG/DL (ref 8.3–10.1)
CHLORIDE SERPL-SCNC: 112 MMOL/L (ref 100–108)
CHOLEST SERPL-MCNC: 125 MG/DL
CO2 SERPL-SCNC: 28 MMOL/L (ref 21–32)
CREAT SERPL-MCNC: 0.89 MG/DL (ref 0.6–1.3)
CREAT UR-MCNC: 156 MG/DL
EOSINOPHIL # BLD AUTO: 0.37 THOUSAND/ΜL (ref 0–0.61)
EOSINOPHIL NFR BLD AUTO: 4 % (ref 0–6)
ERYTHROCYTE [DISTWIDTH] IN BLOOD BY AUTOMATED COUNT: 12.7 % (ref 11.6–15.1)
EST. AVERAGE GLUCOSE BLD GHB EST-MCNC: 123 MG/DL
GFR SERPL CREATININE-BSD FRML MDRD: 89 ML/MIN/1.73SQ M
GLUCOSE P FAST SERPL-MCNC: 120 MG/DL (ref 65–99)
HBA1C MFR BLD: 5.9 %
HCT VFR BLD AUTO: 51.7 % (ref 36.5–49.3)
HDLC SERPL-MCNC: 47 MG/DL
HGB BLD-MCNC: 17 G/DL (ref 12–17)
IMM GRANULOCYTES # BLD AUTO: 0.04 THOUSAND/UL (ref 0–0.2)
IMM GRANULOCYTES NFR BLD AUTO: 0 % (ref 0–2)
LDLC SERPL CALC-MCNC: 66 MG/DL (ref 0–100)
LYMPHOCYTES # BLD AUTO: 3.17 THOUSANDS/ΜL (ref 0.6–4.47)
LYMPHOCYTES NFR BLD AUTO: 30 % (ref 14–44)
MCH RBC QN AUTO: 32 PG (ref 26.8–34.3)
MCHC RBC AUTO-ENTMCNC: 32.9 G/DL (ref 31.4–37.4)
MCV RBC AUTO: 97 FL (ref 82–98)
MICROALBUMIN UR-MCNC: 17.7 MG/L (ref 0–20)
MICROALBUMIN/CREAT 24H UR: 11 MG/G CREATININE (ref 0–30)
MONOCYTES # BLD AUTO: 0.95 THOUSAND/ΜL (ref 0.17–1.22)
MONOCYTES NFR BLD AUTO: 9 % (ref 4–12)
NEUTROPHILS # BLD AUTO: 6.1 THOUSANDS/ΜL (ref 1.85–7.62)
NEUTS SEG NFR BLD AUTO: 56 % (ref 43–75)
NRBC BLD AUTO-RTO: 0 /100 WBCS
PLATELET # BLD AUTO: 257 THOUSANDS/UL (ref 149–390)
PMV BLD AUTO: 10.3 FL (ref 8.9–12.7)
POTASSIUM SERPL-SCNC: 4.4 MMOL/L (ref 3.5–5.3)
PROT SERPL-MCNC: 6.7 G/DL (ref 6.4–8.2)
RBC # BLD AUTO: 5.31 MILLION/UL (ref 3.88–5.62)
SODIUM SERPL-SCNC: 143 MMOL/L (ref 136–145)
TRIGL SERPL-MCNC: 61 MG/DL
TSH SERPL DL<=0.05 MIU/L-ACNC: 1.32 UIU/ML (ref 0.45–4.5)
WBC # BLD AUTO: 10.68 THOUSAND/UL (ref 4.31–10.16)

## 2022-04-19 PROCEDURE — 36415 COLL VENOUS BLD VENIPUNCTURE: CPT

## 2022-04-19 PROCEDURE — 3061F NEG MICROALBUMINURIA REV: CPT | Performed by: INTERNAL MEDICINE

## 2022-04-19 PROCEDURE — 82570 ASSAY OF URINE CREATININE: CPT | Performed by: INTERNAL MEDICINE

## 2022-04-19 PROCEDURE — 84443 ASSAY THYROID STIM HORMONE: CPT

## 2022-04-19 PROCEDURE — 3044F HG A1C LEVEL LT 7.0%: CPT | Performed by: INTERNAL MEDICINE

## 2022-04-19 PROCEDURE — 80053 COMPREHEN METABOLIC PANEL: CPT

## 2022-04-19 PROCEDURE — 80061 LIPID PANEL: CPT

## 2022-04-19 PROCEDURE — 82043 UR ALBUMIN QUANTITATIVE: CPT | Performed by: INTERNAL MEDICINE

## 2022-04-19 PROCEDURE — 83036 HEMOGLOBIN GLYCOSYLATED A1C: CPT

## 2022-04-19 PROCEDURE — 85025 COMPLETE CBC W/AUTO DIFF WBC: CPT

## 2022-04-22 ENCOUNTER — OFFICE VISIT (OUTPATIENT)
Dept: INTERNAL MEDICINE CLINIC | Facility: CLINIC | Age: 67
End: 2022-04-22
Payer: COMMERCIAL

## 2022-04-22 VITALS
BODY MASS INDEX: 31.83 KG/M2 | TEMPERATURE: 98.4 F | HEART RATE: 85 BPM | SYSTOLIC BLOOD PRESSURE: 124 MMHG | HEIGHT: 72 IN | DIASTOLIC BLOOD PRESSURE: 70 MMHG | WEIGHT: 235 LBS | OXYGEN SATURATION: 97 %

## 2022-04-22 DIAGNOSIS — I10 ESSENTIAL HYPERTENSION, BENIGN: ICD-10-CM

## 2022-04-22 DIAGNOSIS — Z00.00 MEDICARE ANNUAL WELLNESS VISIT, SUBSEQUENT: ICD-10-CM

## 2022-04-22 DIAGNOSIS — E11.65 UNCONTROLLED TYPE 2 DIABETES MELLITUS WITH HYPERGLYCEMIA (HCC): Primary | ICD-10-CM

## 2022-04-22 DIAGNOSIS — E78.2 MIXED HYPERLIPIDEMIA: ICD-10-CM

## 2022-04-22 PROCEDURE — 3008F BODY MASS INDEX DOCD: CPT | Performed by: INTERNAL MEDICINE

## 2022-04-22 PROCEDURE — 1125F AMNT PAIN NOTED PAIN PRSNT: CPT | Performed by: INTERNAL MEDICINE

## 2022-04-22 PROCEDURE — 1036F TOBACCO NON-USER: CPT | Performed by: INTERNAL MEDICINE

## 2022-04-22 PROCEDURE — 99214 OFFICE O/P EST MOD 30 MIN: CPT | Performed by: INTERNAL MEDICINE

## 2022-04-22 PROCEDURE — 1160F RVW MEDS BY RX/DR IN RCRD: CPT | Performed by: INTERNAL MEDICINE

## 2022-04-22 PROCEDURE — 3288F FALL RISK ASSESSMENT DOCD: CPT | Performed by: INTERNAL MEDICINE

## 2022-04-22 PROCEDURE — G0439 PPPS, SUBSEQ VISIT: HCPCS | Performed by: INTERNAL MEDICINE

## 2022-04-22 PROCEDURE — 3074F SYST BP LT 130 MM HG: CPT | Performed by: INTERNAL MEDICINE

## 2022-04-22 PROCEDURE — 3078F DIAST BP <80 MM HG: CPT | Performed by: INTERNAL MEDICINE

## 2022-04-22 PROCEDURE — 1170F FXNL STATUS ASSESSED: CPT | Performed by: INTERNAL MEDICINE

## 2022-04-22 PROCEDURE — 3725F SCREEN DEPRESSION PERFORMED: CPT | Performed by: INTERNAL MEDICINE

## 2022-04-22 NOTE — PROGRESS NOTES
Assessment/Plan:    BMI Counseling: Body mass index is 31 87 kg/m²  The BMI is above normal  Nutrition recommendations include decreasing portion sizes, encouraging healthy choices of fruits and vegetables and decreasing fast food intake  Exercise recommendations include moderate physical activity 150 minutes/week  Rationale for BMI follow-up plan is due to patient being overweight or obese  1  Uncontrolled type 2 diabetes mellitus with hyperglycemia ([facility] )    2  Essential hypertension, benign    3  Mixed hyperlipidemia    4  Medicare annual wellness visit, subsequent           Subjective:      Patient ID: Carisa Mcdaniels is a 77 y o  male  Follow-up on blood test done on 04/19/2022 test discussed with him, also medical wellness exam      The following portions of the patient's history were reviewed and updated as appropriate: He  has a past medical history of Acquired hypothyroidism, Anxiety, Anxiety disorder, Cigarette smoker, Colon cancer screening declined, Diabetes mellitus ([place] [address] ), Diabetes mellitus, type 2 ([place] [address] ), Dyslipidemia, Essential hypertension, benign, Hyperlipidemia, Hypertension, Kidney stone, and Type II diabetes mellitus, uncontrolled  He   Patient Active Problem List    Diagnosis Date Noted    Needs flu shot 09/17/2021    Body mass index 34 0-34 9, adult 01/12/2021    Medicare annual wellness visit, subsequent 01/12/2021    [facility], morbid ([facility] ) 01/12/2021    Dyslipidemia     Diabetes mellitus, type 2 ([place] [address] )     Colon cancer screening declined     Dysthymic disorder 09/16/2020    Body mass index 36 0-36 9, adult 09/16/2020    Hypertension     Mixed hyperlipidemia     Essential hypertension, benign     Type II diabetes mellitus, uncontrolled     Anxiety disorder      He  has a past surgical history that includes Kidney stone surgery and Cardiac catheterization  His family history includes Heart attack in his father; Heart disease in his father    He  reports that he quit smoking about 3 years ago  He has a 2 50 pack-year smoking history  He has never used smokeless tobacco  He reports current alcohol use of about 1 0 standard drink of alcohol per week  He reports that he does not use drugs  Current Outpatient Medications   Medication Sig Dispense Refill    ALPRAZolam (XANAX) 0 5 mg tablet TAKE 1 TABLET BY MOUTH TWICE A DAY AS NEEDED FOR ANXIETY 60 tablet 0    Blood Glucose Monitoring Suppl (ONE TOUCH ULTRA 2) w/Device KIT CONTOUR METER, USE 3 TIMES A DAY E11 65 1 kit 0    CINNAMON PO Take by mouth      Lancets MISC Use 3 (three) times a day Contour lancets E11 65 100 each 1    lisinopril (ZESTRIL) 5 mg tablet Take 1 tablet (5 mg total) by mouth daily 90 tablet 1    metFORMIN (GLUCOPHAGE) 1000 MG tablet TAKE 1 TABLET BY MOUTH TWICE A DAY WITH MEALS 180 tablet 0    OneTouch Ultra test strip USE 1 EACH 3 (THREE) TIMES A DAY USE AS INSTRUCTED CONTOUR TEST STRIPS E11 65 100 strip 1    patient supplied medication Multi-Betic vitamin        simvastatin (ZOCOR) 20 mg tablet TAKE 1 TABLET BY MOUTH EVERYDAY AT BEDTIME 90 tablet 1    Trulicity 3 CP/3 2NA SOPN INJECT 0 5 ML (3 MG TOTAL) UNDER THE SKIN ONCE A WEEK 2 mL 3    APPLE CIDER VINEGAR PO Take by mouth 2 daily (Patient not taking: Reported on 12/17/2021 )      aspirin (ECOTRIN LOW STRENGTH) 81 mg EC tablet Take 81 mg by mouth daily (Patient not taking: Reported on 12/17/2021 )       No current facility-administered medications for this visit       Current Outpatient Medications on File Prior to Visit   Medication Sig    ALPRAZolam (XANAX) 0 5 mg tablet TAKE 1 TABLET BY MOUTH TWICE A DAY AS NEEDED FOR ANXIETY    Blood Glucose Monitoring Suppl (ONE TOUCH ULTRA 2) w/Device KIT CONTOUR METER, USE 3 TIMES A DAY E11 65    CINNAMON PO Take by mouth    Lancets MISC Use 3 (three) times a day Contour lancets E11 65    lisinopril (ZESTRIL) 5 mg tablet Take 1 tablet (5 mg total) by mouth daily    metFORMIN (GLUCOPHAGE) 1000 MG tablet TAKE 1 TABLET BY MOUTH TWICE A DAY WITH MEALS    OneTouch Ultra test strip USE 1 EACH 3 (THREE) TIMES A DAY USE AS INSTRUCTED CONTOUR TEST STRIPS E11 65    patient supplied medication Multi-Betic vitamin      simvastatin (ZOCOR) 20 mg tablet TAKE 1 TABLET BY MOUTH EVERYDAY AT BEDTIME    Trulicity 3 ME/3 8BX SOPN INJECT 0 5 ML (3 MG TOTAL) UNDER THE SKIN ONCE A WEEK    APPLE CIDER VINEGAR PO Take by mouth 2 daily (Patient not taking: Reported on 12/17/2021 )    aspirin (ECOTRIN LOW STRENGTH) 81 mg EC tablet Take 81 mg by mouth daily (Patient not taking: Reported on 12/17/2021 )     No current facility-administered medications on file prior to visit  He has No Known Allergies       Review of Systems   Constitutional: Negative for chills and fever  HENT: Negative for congestion, ear pain and sore throat  Eyes: Negative for pain  Respiratory: Negative for cough and shortness of breath  Cardiovascular: Negative for chest pain and leg swelling  Gastrointestinal: Negative for abdominal pain, nausea and vomiting  Endocrine: Negative for polyuria  Genitourinary: Negative for difficulty urinating, frequency and urgency  Musculoskeletal: Negative for arthralgias and back pain  Skin: Negative for rash  Neurological: Negative for weakness and headaches  Psychiatric/Behavioral: Negative for sleep disturbance  The patient is not nervous/anxious  Objective:      /70 (BP Location: Left arm, Patient Position: Sitting, Cuff Size: Adult)   Pulse 85   Temp 98 4 °F (36 9 °C) (Temporal)   Ht 6' (1 829 m)   Wt 107 kg (235 lb)   SpO2 97%   BMI 31 87 kg/m²     Recent Results (from the past 1344 hour(s))   Microalbumin / creatinine urine ratio    Collection Time: 04/19/22  6:38 AM   Result Value Ref Range    Creatinine, Ur 156 0 mg/dL    Microalbum  ,U,Random 17 7 0 0 - 20 0 mg/L    Microalb Creat Ratio 11 0 - 30 mg/g creatinine   CBC and differential    Collection Time: 04/19/22  6:38 AM Result Value Ref Range    WBC 10 68 (H) 4 31 - 10 16 Thousand/uL    RBC 5 31 3 88 - 5 62 Million/uL    Hemoglobin 17 0 12 0 - 17 0 g/dL    Hematocrit 51 7 (H) 36 5 - 49 3 %    MCV 97 82 - 98 fL    MCH 32 0 26 8 - 34 3 pg    MCHC 32 9 31 4 - 37 4 g/dL    RDW 12 7 11 6 - 15 1 %    MPV 10 3 8 9 - 12 7 fL    Platelets 270 280 - 517 Thousands/uL    nRBC 0 /100 WBCs    Neutrophils Relative 56 43 - 75 %    Immat GRANS % 0 0 - 2 %    Lymphocytes Relative 30 14 - 44 %    Monocytes Relative 9 4 - 12 %    Eosinophils Relative 4 0 - 6 %    Basophils Relative 1 0 - 1 %    Neutrophils Absolute 6 10 1 85 - 7 62 Thousands/µL    Immature Grans Absolute 0 04 0 00 - 0 20 Thousand/uL    Lymphocytes Absolute 3 17 0 60 - 4 47 Thousands/µL    Monocytes Absolute 0 95 0 17 - 1 22 Thousand/µL    Eosinophils Absolute 0 37 0 00 - 0 61 Thousand/µL    Basophils Absolute 0 05 0 00 - 0 10 Thousands/µL   Comprehensive metabolic panel    Collection Time: 04/19/22  6:38 AM   Result Value Ref Range    Sodium 143 136 - 145 mmol/L    Potassium 4 4 3 5 - 5 3 mmol/L    Chloride 112 (H) 100 - 108 mmol/L    CO2 28 21 - 32 mmol/L    ANION GAP 3 (L) 4 - 13 mmol/L    BUN 18 5 - 25 mg/dL    Creatinine 0 89 0 60 - 1 30 mg/dL    Glucose, Fasting 120 (H) 65 - 99 mg/dL    Calcium 9 8 8 3 - 10 1 mg/dL    AST 14 5 - 45 U/L    ALT 22 12 - 78 U/L    Alkaline Phosphatase 34 (L) 46 - 116 U/L    Total Protein 6 7 6 4 - 8 2 g/dL    Albumin 3 6 3 5 - 5 0 g/dL    Total Bilirubin 0 40 0 20 - 1 00 mg/dL    eGFR 89 ml/min/1 73sq m   Hemoglobin A1C    Collection Time: 04/19/22  6:38 AM   Result Value Ref Range    Hemoglobin A1C 5 9 (H) Normal 3 8-5 6%; PreDiabetic 5 7-6 4%;  Diabetic >=6 5%; Glycemic control for adults with diabetes <7 0% %     mg/dl   Lipid Panel with Direct LDL reflex    Collection Time: 04/19/22  6:38 AM   Result Value Ref Range    Cholesterol 125 See Comment mg/dL    Triglycerides 61 See Comment mg/dL    HDL, Direct 47 >=40 mg/dL    LDL Calculated 66 0 - 100 mg/dL   TSH, 3rd generation    Collection Time: 04/19/22  6:38 AM   Result Value Ref Range    TSH 3RD GENERATON 1 320 0 450 - 4 500 uIU/mL        Physical Exam  Constitutional:       Appearance: Normal appearance  HENT:      Head: Normocephalic  Right Ear: Tympanic membrane, ear canal and external ear normal       Left Ear: Tympanic membrane, ear canal and external ear normal       Nose: Nose normal  No congestion  Mouth/Throat:      Mouth: Mucous membranes are moist       Pharynx: Oropharynx is clear  No oropharyngeal exudate or posterior oropharyngeal erythema  Eyes:      Extraocular Movements: Extraocular movements intact  Conjunctiva/sclera: Conjunctivae normal       Pupils: Pupils are equal, round, and reactive to light  Cardiovascular:      Rate and Rhythm: Normal rate and regular rhythm  Heart sounds: Normal heart sounds  No murmur heard  Pulmonary:      Effort: Pulmonary effort is normal       Breath sounds: Normal breath sounds  No wheezing or rales  Abdominal:      General: Bowel sounds are normal  There is no distension  Palpations: Abdomen is soft  Tenderness: There is no abdominal tenderness  Musculoskeletal:         General: Normal range of motion  Cervical back: Normal range of motion and neck supple  Right lower leg: No edema  Left lower leg: No edema  Lymphadenopathy:      Cervical: No cervical adenopathy  Skin:     General: Skin is warm  Neurological:      General: No focal deficit present  Mental Status: He is alert and oriented to person, place, and time           Answers for HPI/ROS submitted by the patient on 4/19/2022  How would you rate your overall health?: good  Compared to last year, how is your physical health?: slightly better  In general, how satisfied are you with your life?: satisfied  Compared to last year, how is your eyesight?: same  Compared to last year, how is your hearing?: same  Compared to last year, how is your emotional/mental health?: same  How often is anger a problem for you?: sometimes  How often do you feel unusually tired/fatigued?: sometimes  In the past 7 days, how much pain have you experienced?: none  In the past 6 months, have you lost or gained 10 pounds without trying?: No  One or more falls in the last year: No  Do you have trouble with the stairs inside or outside your home?: No  Does your home have working smoke alarms?: Yes  Does your home have a carbon monoxide monitor?: Yes  Which safety hazards (if any) have you experienced in your home? Please select all that apply : none  How would you describe your current diet?  Please select all that apply : Diabetic, Low Cholesterol, No Added Salt, Limited junk food  In addition to prescription medications, are you taking any over-the-counter supplements?: Yes  If yes, what supplements are you taking?: Cinnamon, Magnesium Zinc Calcium,Diabetic Vitamins  Can you manage your medications?: Yes  Are you currently taking any opioid medications?: No  Can you walk and transfer into and out of your bed and chair?: Yes  Can you dress and groom yourself?: Yes  Can you bathe or shower yourself?: Yes  Can you feed yourself?: Yes  Can you do your laundry/ housekeeping?: Yes  Can you manage your money, pay your bills, and track your expenses?: Yes  Can you make your own meals?: Yes  Can you do your own shopping?: Yes  Within the last 12 months, have you had any hospitalizations or Emergency Department visits?: No  Do you have a living will?: No  Do you have a Durable POA (Power of ) for healthcare decisions?: No  Do you have an Advanced Directive for end of life decisions?: No  How often have you used an illegal drug (including marijuana) or a prescription medication for non-medical reasons in the past year?: never  What is the typical number of drinks you consume in a day?: 0  What is the typical number of drinks you consume in a week?: 0  How often did you have a drink containing alcohol in the past year?: never  How many drinks did you have on a typical day  when you were drinking in the past year?: 0  How often did you have 6 or more drinks on one occasion in the past year?: never

## 2022-04-22 NOTE — PROGRESS NOTES
Assessment and Plan:     Problem List Items Addressed This Visit        Endocrine    Type II diabetes mellitus, uncontrolled - Primary       Cardiovascular and Mediastinum    Essential hypertension, benign       Other    Mixed hyperlipidemia    Medicare annual wellness visit, subsequent           Preventive health issues were discussed with patient, and age appropriate screening tests were ordered as noted in patient's After Visit Summary  Personalized health advice and appropriate referrals for health education or preventive services given if needed, as noted in patient's After Visit Summary       History of Present Illness:     Patient presents for Medicare Annual Wellness visit    Patient Care Team:  Romeo Barber MD as PCP - General (Internal Medicine)     Problem List:     Patient Active Problem List   Diagnosis    Hypertension    Mixed hyperlipidemia    Essential hypertension, benign    Type II diabetes mellitus, uncontrolled    Anxiety disorder    Dysthymic disorder    Body mass index 36 0-36 9, adult    Dyslipidemia    Diabetes mellitus, type 2 ([facility] )    Colon cancer screening declined    Body mass index 34 0-34 9, adult    Medicare annual wellness visit, subsequent    Obesity, morbid ([facility] )    Needs flu shot      Past Medical and Surgical History:     Past Medical History:   Diagnosis Date    Acquired hypothyroidism     Anxiety     Anxiety disorder     Cigarette smoker     Colon cancer screening declined     does understand the risk of missing colon cancer - As per [employer]    Diabetes mellitus ([facility] )     Diabetes mellitus, type 2 ([facility] )     Dyslipidemia     Essential hypertension, benign     Hyperlipidemia     Hypertension     Kidney stone     Type II diabetes mellitus, uncontrolled      Past Surgical History:   Procedure Laterality Date    CARDIAC CATHETERIZATION      1/19/09 no obstructive- Dr Arya Camacho      ? 2006 s/p stone removal        Family History:     Family History   Problem Relation Age of Onset    Heart disease Father     Heart attack Father       Social History:     Social History     Socioeconomic History    Marital status: /Civil Union     Spouse name: None    Number of children: 2    Years of education: None    Highest education level: None   Occupational History    None   Tobacco Use    Smoking status: Former Smoker     Packs/day: 0 50     Years: 5 00     Pack years: 2 50     Quit date: 9/16/2018     Years since quitting: 3 6    Smokeless tobacco: Never Used   Vaping Use    Vaping Use: Never used   Substance and Sexual Activity    Alcohol use:  Yes     Alcohol/week: 1 0 standard drink     Types: 1 Glasses of wine per week     Comment: rare    Drug use: No    Sexual activity: None   Other Topics Concern    None   Social History Narrative    No alcohol use    Children: 2 daughters    Exercise: No    Caffeine: No      - As per [employer]     Social Determinants of Health     Financial Resource Strain: Not on file   Food Insecurity: Not on file   Transportation Needs: Not on file   Physical Activity: Not on file   Stress: Not on file   Social Connections: Not on file   Intimate Partner Violence: Not on file   Housing Stability: Not on file      Medications and Allergies:     Current Outpatient Medications   Medication Sig Dispense Refill    ALPRAZolam (XANAX) 0 5 mg tablet TAKE 1 TABLET BY MOUTH TWICE A DAY AS NEEDED FOR ANXIETY 60 tablet 0    Blood Glucose Monitoring Suppl (ONE TOUCH ULTRA 2) w/Device KIT CONTOUR METER, USE 3 TIMES A DAY E11 65 1 kit 0    CINNAMON PO Take by mouth      Lancets MISC Use 3 (three) times a day Contour lancets E11 65 100 each 1    lisinopril (ZESTRIL) 5 mg tablet Take 1 tablet (5 mg total) by mouth daily 90 tablet 1    metFORMIN (GLUCOPHAGE) 1000 MG tablet TAKE 1 TABLET BY MOUTH TWICE A DAY WITH MEALS 180 tablet 0    OneTouch Ultra test strip USE 1 EACH 3 (THREE) TIMES A DAY USE AS INSTRUCTED CONTOUR TEST STRIPS E11 65 100 strip 1    patient supplied medication Multi-Betic vitamin        simvastatin (ZOCOR) 20 mg tablet TAKE 1 TABLET BY MOUTH EVERYDAY AT BEDTIME 90 tablet 1    Trulicity 3 NM/6 0QL SOPN INJECT 0 5 ML (3 MG TOTAL) UNDER THE SKIN ONCE A WEEK 2 mL 3    APPLE CIDER VINEGAR PO Take by mouth 2 daily (Patient not taking: Reported on 12/17/2021 )      aspirin (ECOTRIN LOW STRENGTH) 81 mg EC tablet Take 81 mg by mouth daily (Patient not taking: Reported on 12/17/2021 )       No current facility-administered medications for this visit  No Known Allergies   Immunizations:     Immunization History   Administered Date(s) Administered    COVID-19 MODERNA VACC 0 25 ML IM BOOSTER 11/17/2021    COVID-19 MODERNA VACC 0 5 ML IM 04/14/2021, 05/10/2021    INFLUENZA 09/16/2020    Influenza, high dose seasonal 0 7 mL 09/17/2021    influenza, trivalent, adjuvanted 09/16/2020      Health Maintenance:         Topic Date Due    Colorectal Cancer Screening  Never done    Hepatitis C Screening  Completed         Topic Date Due    Pneumococcal Vaccine: 65+ Years (1 of 2 - PPSV23) Never done    DTaP,Tdap,and Td Vaccines (1 - Tdap) Never done      Medicare Health Risk Assessment:     /70 (BP Location: Left arm, Patient Position: Sitting, Cuff Size: Adult)   Pulse 85   Temp 98 4 °F (36 9 °C) (Temporal)   Ht 6' (1 829 m)   Wt 107 kg (235 lb)   SpO2 97%   BMI 31 87 kg/m²      Dominik is here for his Subsequent Wellness visit  Last Medicare Wellness visit information reviewed, patient interviewed and updates made to the record today  Health Risk Assessment:   Patient rates overall health as good  Patient feels that their physical health rating is slightly better  Patient is satisfied with their life  Eyesight was rated as same  Hearing was rated as same  Patient feels that their emotional and mental health rating is same  Patients states they are sometimes angry   Patient states they are sometimes unusually tired/fatigued  Pain experienced in the last 7 days has been none  Patient states that he has experienced no weight loss or gain in last 6 months  Depression Screening:   PHQ-9 Score: 3      Fall Risk Screening: In the past year, patient has experienced: no history of falling in past year      Home Safety:  Patient does not have trouble with stairs inside or outside of their home  Patient has working smoke alarms and has working carbon monoxide detector  Home safety hazards include: none  Nutrition:   Current diet is Diabetic, Low Cholesterol, No Added Salt and Limited junk food  Medications:   Patient is currently taking over-the-counter supplements  OTC medications include: Cinnamon, Magnesium Zinc Calcium,Diabetic Vitamins  Patient is able to manage medications  Activities of Daily Living (ADLs)/Instrumental Activities of Daily Living (IADLs):   Walk and transfer into and out of bed and chair?: Yes  Dress and groom yourself?: Yes    Bathe or shower yourself?: Yes    Feed yourself?  Yes  Do your laundry/housekeeping?: Yes  Manage your money, pay your bills and track your expenses?: Yes  Make your own meals?: Yes    Do your own shopping?: Yes    Previous Hospitalizations:   Any hospitalizations or ED visits within the last 12 months?: No      Advance Care Planning:   Living will: No    Durable POA for healthcare: No    Advanced directive: No    Five wishes given: Yes      PREVENTIVE SCREENINGS      Cardiovascular Screening:    General: Screening Not Indicated and History Lipid Disorder      Diabetes Screening:     General: Screening Not Indicated and History Diabetes      Abdominal Aortic Aneurysm (AAA) Screening:    Risk factors include: age between 73-67 yo and tobacco use        Lung Cancer Screening:     General: Screening Not Indicated      Hepatitis C Screening:    General: Screening Current    Screening, Brief Intervention, and Referral to Treatment (SBIRT)    Screening  Typical number of drinks in a day: 0  Typical number of drinks in a week: 0  Interpretation: Low risk drinking behavior      AUDIT-C Screenin) How often did you have a drink containing alcohol in the past year? never  2) How many drinks did you have on a typical day when you were drinking in the past year? 0  3) How often did you have 6 or more drinks on one occasion in the past year? never    AUDIT-C Score: 0  Interpretation: Score 0-3 (male): Negative screen for alcohol misuse    Single Item Drug Screening:  How often have you used an illegal drug (including marijuana) or a prescription medication for non-medical reasons in the past year? never    Single Item Drug Screen Score: 0  Interpretation: Negative screen for possible drug use disorder      Dane Umana MD

## 2022-04-22 NOTE — PROGRESS NOTES
Diabetic Foot Exam    Patient's shoes and socks removed  Right Foot/Ankle   Right Foot Inspection  Skin Exam: skin normal and skin intact  No dry skin, no warmth, no callus, no erythema, no maceration, no abnormal color, no pre-ulcer, no ulcer and no callus  Toe Exam: No swelling, no tenderness, erythema and  no right toe deformity    Sensory   Monofilament testing: diminished    Vascular  Capillary refills: < 3 seconds  The right DP pulse is 2+  The right PT pulse is 2+  Left Foot/Ankle  Left Foot Inspection  Skin Exam: skin normal and skin intact  No dry skin, no warmth, no erythema, no maceration, normal color, no pre-ulcer, no ulcer and no callus  Toe Exam: ROM and strength within normal limits  No swelling, no tenderness, no erythema and no left toe deformity  Sensory   Monofilament testing: intact    Vascular  Capillary refills: < 3 seconds  The left DP pulse is 2+  The left PT pulse is 2+       Assign Risk Category  No deformity present  Loss of protective sensation  No weak pulses  Risk: 1        Answers for HPI/ROS submitted by the patient on 4/19/2022  How would you rate your overall health?: good  Compared to last year, how is your physical health?: slightly better  In general, how satisfied are you with your life?: satisfied  Compared to last year, how is your eyesight?: same  Compared to last year, how is your hearing?: same  Compared to last year, how is your emotional/mental health?: same  How often is anger a problem for you?: sometimes  How often do you feel unusually tired/fatigued?: sometimes  In the past 7 days, how much pain have you experienced?: none  In the past 6 months, have you lost or gained 10 pounds without trying?: No  One or more falls in the last year: No  Do you have trouble with the stairs inside or outside your home?: No  Does your home have working smoke alarms?: Yes  Does your home have a carbon monoxide monitor?: Yes  Which safety hazards (if any) have you experienced in your home? Please select all that apply : none  How would you describe your current diet?  Please select all that apply : Diabetic, Low Cholesterol, No Added Salt, Limited junk food  In addition to prescription medications, are you taking any over-the-counter supplements?: Yes  If yes, what supplements are you taking?: Cinnamon, Magnesium Zinc Calcium,Diabetic Vitamins  Can you manage your medications?: Yes  Are you currently taking any opioid medications?: No  Can you walk and transfer into and out of your bed and chair?: Yes  Can you dress and groom yourself?: Yes  Can you bathe or shower yourself?: Yes  Can you feed yourself?: Yes  Can you do your laundry/ housekeeping?: Yes  Can you manage your money, pay your bills, and track your expenses?: Yes  Can you make your own meals?: Yes  Can you do your own shopping?: Yes  Within the last 12 months, have you had any hospitalizations or Emergency Department visits?: No  Do you have a living will?: No  Do you have a Durable POA (Power of ) for healthcare decisions?: No  Do you have an Advanced Directive for end of life decisions?: No  How often have you used an illegal drug (including marijuana) or a prescription medication for non-medical reasons in the past year?: never  What is the typical number of drinks you consume in a day?: 0  What is the typical number of drinks you consume in a week?: 0  How often did you have a drink containing alcohol in the past year?: never  How many drinks did you have on a typical day  when you were drinking in the past year?: 0  How often did you have 6 or more drinks on one occasion in the past year?: never

## 2022-05-31 DIAGNOSIS — E11.65 UNCONTROLLED TYPE 2 DIABETES MELLITUS WITH HYPERGLYCEMIA (HCC): ICD-10-CM

## 2022-05-31 NOTE — TELEPHONE ENCOUNTER
Pt wants a 3 month supply of trulicity since he will be traveling and the one month supply wont be enough

## 2022-06-01 RX ORDER — DULAGLUTIDE 3 MG/.5ML
3 INJECTION, SOLUTION SUBCUTANEOUS WEEKLY
Qty: 2 ML | Refills: 3 | Status: SHIPPED | OUTPATIENT
Start: 2022-06-01

## 2022-06-26 ENCOUNTER — APPOINTMENT (EMERGENCY)
Dept: RADIOLOGY | Facility: HOSPITAL | Age: 67
DRG: 379 | End: 2022-06-26
Payer: COMMERCIAL

## 2022-06-26 ENCOUNTER — HOSPITAL ENCOUNTER (INPATIENT)
Facility: HOSPITAL | Age: 67
LOS: 3 days | Discharge: HOME | DRG: 379 | End: 2022-06-29
Attending: EMERGENCY MEDICINE | Admitting: ANESTHESIOLOGY
Payer: COMMERCIAL

## 2022-06-26 DIAGNOSIS — K92.2 LOWER GI BLEED: ICD-10-CM

## 2022-06-26 DIAGNOSIS — K92.1 MELENA: ICD-10-CM

## 2022-06-26 DIAGNOSIS — K92.2 ACUTE UPPER GI BLEED: Primary | ICD-10-CM

## 2022-06-26 PROBLEM — I95.9 HYPOTENSION: Status: ACTIVE | Noted: 2022-06-26

## 2022-06-26 LAB
ABO GROUP BLD BPU: NORMAL
ABO GROUP BLD BPU: NORMAL
ABO GROUP BLD: NORMAL
ALBUMIN SERPL BCP-MCNC: 3 G/DL (ref 3.5–5)
ALP SERPL-CCNC: 35 U/L (ref 46–116)
ALT SERPL W P-5'-P-CCNC: 22 U/L (ref 12–78)
ANION GAP SERPL CALCULATED.3IONS-SCNC: 7 MMOL/L (ref 4–13)
APTT PPP: 23 SECONDS (ref 23–37)
AST SERPL W P-5'-P-CCNC: 12 U/L (ref 5–45)
BASE EXCESS BLDA CALC-SCNC: 0 MMOL/L (ref -2–3)
BASOPHILS # BLD MANUAL: 0 THOUSAND/UL (ref 0–0.1)
BASOPHILS NFR MAR MANUAL: 0 % (ref 0–1)
BILIRUB SERPL-MCNC: 0.27 MG/DL (ref 0.2–1)
BLD GP AB SCN SERPL QL: NEGATIVE
BPU ID: NORMAL
BPU ID: NORMAL
BUN SERPL-MCNC: 17 MG/DL (ref 5–25)
CA-I BLD-SCNC: 1.19 MMOL/L (ref 1.12–1.32)
CALCIUM ALBUM COR SERPL-MCNC: 8.9 MG/DL (ref 8.3–10.1)
CALCIUM SERPL-MCNC: 8.1 MG/DL (ref 8.3–10.1)
CHLORIDE SERPL-SCNC: 110 MMOL/L (ref 100–108)
CO2 SERPL-SCNC: 24 MMOL/L (ref 21–32)
CREAT SERPL-MCNC: 0.92 MG/DL (ref 0.6–1.3)
CROSSMATCH: NORMAL
EOSINOPHIL # BLD MANUAL: 0.33 THOUSAND/UL (ref 0–0.4)
EOSINOPHIL NFR BLD MANUAL: 2 % (ref 0–6)
ERYTHROCYTE [DISTWIDTH] IN BLOOD BY AUTOMATED COUNT: 12.9 % (ref 11.6–15.1)
GFR SERPL CREATININE-BSD FRML MDRD: 85 ML/MIN/1.73SQ M
GLUCOSE SERPL-MCNC: 177 MG/DL (ref 65–140)
GLUCOSE SERPL-MCNC: 183 MG/DL (ref 65–140)
HCO3 BLDA-SCNC: 26 MMOL/L (ref 24–30)
HCT VFR BLD AUTO: 44.8 % (ref 36.5–49.3)
HCT VFR BLD CALC: 44 % (ref 36.5–49.3)
HGB BLD-MCNC: 14.7 G/DL (ref 12–17)
HGB BLDA-MCNC: 15 G/DL (ref 12–17)
INR PPP: 1.17 (ref 0.84–1.19)
LYMPHOCYTES # BLD AUTO: 33 % (ref 14–44)
LYMPHOCYTES # BLD AUTO: 5.46 THOUSAND/UL (ref 0.6–4.47)
MCH RBC QN AUTO: 31.1 PG (ref 26.8–34.3)
MCHC RBC AUTO-ENTMCNC: 32.8 G/DL (ref 31.4–37.4)
MCV RBC AUTO: 95 FL (ref 82–98)
MONOCYTES # BLD AUTO: 1.16 THOUSAND/UL (ref 0–1.22)
MONOCYTES NFR BLD: 7 % (ref 4–12)
NEUTROPHILS # BLD MANUAL: 9.43 THOUSAND/UL (ref 1.85–7.62)
NEUTS SEG NFR BLD AUTO: 57 % (ref 43–75)
PCO2 BLD: 27 MMOL/L (ref 21–32)
PCO2 BLD: 46 MM HG (ref 42–50)
PH BLD: 7.36 [PH] (ref 7.3–7.4)
PLATELET # BLD AUTO: 268 THOUSANDS/UL (ref 149–390)
PLATELET BLD QL SMEAR: ADEQUATE
PMV BLD AUTO: 10.1 FL (ref 8.9–12.7)
PO2 BLD: 36 MM HG (ref 35–45)
POTASSIUM BLD-SCNC: 4.1 MMOL/L (ref 3.5–5.3)
POTASSIUM SERPL-SCNC: 4 MMOL/L (ref 3.5–5.3)
PROT SERPL-MCNC: 5.9 G/DL (ref 6.4–8.2)
PROTHROMBIN TIME: 14.5 SECONDS (ref 11.6–14.5)
RBC # BLD AUTO: 4.72 MILLION/UL (ref 3.88–5.62)
RH BLD: POSITIVE
SAO2 % BLD FROM PO2: 67 % (ref 60–85)
SODIUM BLD-SCNC: 140 MMOL/L (ref 136–145)
SODIUM SERPL-SCNC: 141 MMOL/L (ref 136–145)
SPECIMEN EXPIRATION DATE: NORMAL
SPECIMEN SOURCE: ABNORMAL
UNIT DISPENSE STATUS: NORMAL
UNIT DISPENSE STATUS: NORMAL
UNIT PRODUCT CODE: NORMAL
UNIT PRODUCT CODE: NORMAL
UNIT PRODUCT VOLUME: 350 ML
UNIT PRODUCT VOLUME: 350 ML
UNIT RH: NORMAL
UNIT RH: NORMAL
VARIANT LYMPHS # BLD AUTO: 1 %
WBC # BLD AUTO: 16.54 THOUSAND/UL (ref 4.31–10.16)

## 2022-06-26 PROCEDURE — 36415 COLL VENOUS BLD VENIPUNCTURE: CPT | Performed by: EMERGENCY MEDICINE

## 2022-06-26 PROCEDURE — 86850 RBC ANTIBODY SCREEN: CPT | Performed by: EMERGENCY MEDICINE

## 2022-06-26 PROCEDURE — 99291 CRITICAL CARE FIRST HOUR: CPT | Performed by: EMERGENCY MEDICINE

## 2022-06-26 PROCEDURE — G1004 CDSM NDSC: HCPCS

## 2022-06-26 PROCEDURE — 85610 PROTHROMBIN TIME: CPT | Performed by: EMERGENCY MEDICINE

## 2022-06-26 PROCEDURE — 82330 ASSAY OF CALCIUM: CPT

## 2022-06-26 PROCEDURE — 84132 ASSAY OF SERUM POTASSIUM: CPT

## 2022-06-26 PROCEDURE — 36430 TRANSFUSION BLD/BLD COMPNT: CPT

## 2022-06-26 PROCEDURE — 86920 COMPATIBILITY TEST SPIN: CPT

## 2022-06-26 PROCEDURE — C9113 INJ PANTOPRAZOLE SODIUM, VIA: HCPCS | Performed by: EMERGENCY MEDICINE

## 2022-06-26 PROCEDURE — 80053 COMPREHEN METABOLIC PANEL: CPT | Performed by: EMERGENCY MEDICINE

## 2022-06-26 PROCEDURE — 86901 BLOOD TYPING SEROLOGIC RH(D): CPT | Performed by: EMERGENCY MEDICINE

## 2022-06-26 PROCEDURE — P9016 RBC LEUKOCYTES REDUCED: HCPCS

## 2022-06-26 PROCEDURE — 86923 COMPATIBILITY TEST ELECTRIC: CPT

## 2022-06-26 PROCEDURE — 96374 THER/PROPH/DIAG INJ IV PUSH: CPT

## 2022-06-26 PROCEDURE — 96365 THER/PROPH/DIAG IV INF INIT: CPT

## 2022-06-26 PROCEDURE — 85014 HEMATOCRIT: CPT

## 2022-06-26 PROCEDURE — 85730 THROMBOPLASTIN TIME PARTIAL: CPT | Performed by: EMERGENCY MEDICINE

## 2022-06-26 PROCEDURE — 82947 ASSAY GLUCOSE BLOOD QUANT: CPT

## 2022-06-26 PROCEDURE — 99291 CRITICAL CARE FIRST HOUR: CPT | Performed by: ANESTHESIOLOGY

## 2022-06-26 PROCEDURE — 85007 BL SMEAR W/DIFF WBC COUNT: CPT | Performed by: EMERGENCY MEDICINE

## 2022-06-26 PROCEDURE — 74178 CT ABD&PLV WO CNTR FLWD CNTR: CPT

## 2022-06-26 PROCEDURE — 86900 BLOOD TYPING SEROLOGIC ABO: CPT | Performed by: EMERGENCY MEDICINE

## 2022-06-26 PROCEDURE — 82803 BLOOD GASES ANY COMBINATION: CPT

## 2022-06-26 PROCEDURE — 84295 ASSAY OF SERUM SODIUM: CPT

## 2022-06-26 PROCEDURE — 85027 COMPLETE CBC AUTOMATED: CPT | Performed by: EMERGENCY MEDICINE

## 2022-06-26 PROCEDURE — 30233N1 TRANSFUSION OF NONAUTOLOGOUS RED BLOOD CELLS INTO PERIPHERAL VEIN, PERCUTANEOUS APPROACH: ICD-10-PCS | Performed by: INTERNAL MEDICINE

## 2022-06-26 PROCEDURE — 99285 EMERGENCY DEPT VISIT HI MDM: CPT

## 2022-06-26 RX ORDER — SODIUM CHLORIDE, SODIUM GLUCONATE, SODIUM ACETATE, POTASSIUM CHLORIDE, MAGNESIUM CHLORIDE, SODIUM PHOSPHATE, DIBASIC, AND POTASSIUM PHOSPHATE .53; .5; .37; .037; .03; .012; .00082 G/100ML; G/100ML; G/100ML; G/100ML; G/100ML; G/100ML; G/100ML
75 INJECTION, SOLUTION INTRAVENOUS CONTINUOUS
Status: DISCONTINUED | OUTPATIENT
Start: 2022-06-26 | End: 2022-06-27

## 2022-06-26 RX ORDER — INSULIN LISPRO 100 [IU]/ML
1-6 INJECTION, SOLUTION INTRAVENOUS; SUBCUTANEOUS EVERY 6 HOURS SCHEDULED
Status: DISCONTINUED | OUTPATIENT
Start: 2022-06-27 | End: 2022-06-29

## 2022-06-26 RX ORDER — METOCLOPRAMIDE HYDROCHLORIDE 5 MG/ML
10 INJECTION INTRAMUSCULAR; INTRAVENOUS EVERY 6 HOURS
Status: COMPLETED | OUTPATIENT
Start: 2022-06-26 | End: 2022-06-27

## 2022-06-26 RX ORDER — PRAVASTATIN SODIUM 40 MG
40 TABLET ORAL
Refills: 1 | Status: DISCONTINUED | OUTPATIENT
Start: 2022-06-27 | End: 2022-06-29 | Stop reason: HOSPADM

## 2022-06-26 RX ADMIN — SODIUM CHLORIDE 80 MG: 9 INJECTION, SOLUTION INTRAVENOUS at 20:18

## 2022-06-26 RX ADMIN — SODIUM CHLORIDE, SODIUM GLUCONATE, SODIUM ACETATE, POTASSIUM CHLORIDE, MAGNESIUM CHLORIDE, SODIUM PHOSPHATE, DIBASIC, AND POTASSIUM PHOSPHATE 75 ML/HR: .53; .5; .37; .037; .03; .012; .00082 INJECTION, SOLUTION INTRAVENOUS at 21:50

## 2022-06-26 RX ADMIN — IOHEXOL 65 ML: 350 INJECTION, SOLUTION INTRAVENOUS at 19:54

## 2022-06-26 RX ADMIN — DESMOPRESSIN ACETATE 32 MCG: 4 SOLUTION INTRAVENOUS at 21:50

## 2022-06-26 RX ADMIN — PANTOPRAZOLE SODIUM 8 MG/HR: 40 INJECTION, POWDER, FOR SOLUTION INTRAVENOUS at 20:28

## 2022-06-26 RX ADMIN — METOCLOPRAMIDE HYDROCHLORIDE 10 MG: 5 INJECTION INTRAMUSCULAR; INTRAVENOUS at 20:30

## 2022-06-26 NOTE — ED NOTES
UNCROSSMATCHED UNIT #  [id number] on rapid infuser at this time     Joel Fernández, LUCIA  06/26/22 1941

## 2022-06-27 PROBLEM — Z79.82 ASPIRIN LONG-TERM USE: Status: ACTIVE | Noted: 2022-06-27

## 2022-06-27 LAB
ABO GROUP BLD BPU: NORMAL
ALBUMIN SERPL BCP-MCNC: 2.8 G/DL (ref 3.5–5)
ALP SERPL-CCNC: 32 U/L (ref 46–116)
ALT SERPL W P-5'-P-CCNC: 20 U/L (ref 12–78)
ANION GAP SERPL CALCULATED.3IONS-SCNC: 6 MMOL/L (ref 4–13)
AST SERPL W P-5'-P-CCNC: 15 U/L (ref 5–45)
BASOPHILS # BLD AUTO: 0.04 THOUSANDS/ΜL (ref 0–0.1)
BASOPHILS NFR BLD AUTO: 0 % (ref 0–1)
BILIRUB SERPL-MCNC: 0.44 MG/DL (ref 0.2–1)
BPU ID: NORMAL
BUN SERPL-MCNC: 18 MG/DL (ref 5–25)
CALCIUM ALBUM COR SERPL-MCNC: 9.2 MG/DL (ref 8.3–10.1)
CALCIUM SERPL-MCNC: 8.2 MG/DL (ref 8.3–10.1)
CHLORIDE SERPL-SCNC: 110 MMOL/L (ref 100–108)
CO2 SERPL-SCNC: 24 MMOL/L (ref 21–32)
CREAT SERPL-MCNC: 0.7 MG/DL (ref 0.6–1.3)
CROSSMATCH: NORMAL
CROSSMATCH: NORMAL
EOSINOPHIL # BLD AUTO: 0.27 THOUSAND/ΜL (ref 0–0.61)
EOSINOPHIL NFR BLD AUTO: 2 % (ref 0–6)
ERYTHROCYTE [DISTWIDTH] IN BLOOD BY AUTOMATED COUNT: 13.3 % (ref 11.6–15.1)
GFR SERPL CREATININE-BSD FRML MDRD: 97 ML/MIN/1.73SQ M
GLUCOSE SERPL-MCNC: 109 MG/DL (ref 65–140)
GLUCOSE SERPL-MCNC: 141 MG/DL (ref 65–140)
GLUCOSE SERPL-MCNC: 151 MG/DL (ref 65–140)
GLUCOSE SERPL-MCNC: 86 MG/DL (ref 65–140)
GLUCOSE SERPL-MCNC: 91 MG/DL (ref 65–140)
GLUCOSE SERPL-MCNC: 95 MG/DL (ref 65–140)
HCT VFR BLD AUTO: 42 % (ref 36.5–49.3)
HCT VFR BLD AUTO: 42.2 % (ref 36.5–49.3)
HCT VFR BLD AUTO: 43.8 % (ref 36.5–49.3)
HCT VFR BLD AUTO: 45.3 % (ref 36.5–49.3)
HGB BLD-MCNC: 13.5 G/DL (ref 12–17)
HGB BLD-MCNC: 14 G/DL (ref 12–17)
HGB BLD-MCNC: 14.4 G/DL (ref 12–17)
HGB BLD-MCNC: 15 G/DL (ref 12–17)
IMM GRANULOCYTES # BLD AUTO: 0.05 THOUSAND/UL (ref 0–0.2)
IMM GRANULOCYTES NFR BLD AUTO: 0 % (ref 0–2)
INR PPP: 1.23 (ref 0.84–1.19)
LYMPHOCYTES # BLD AUTO: 3.14 THOUSANDS/ΜL (ref 0.6–4.47)
LYMPHOCYTES NFR BLD AUTO: 26 % (ref 14–44)
MAGNESIUM SERPL-MCNC: 1.9 MG/DL (ref 1.6–2.6)
MCH RBC QN AUTO: 31 PG (ref 26.8–34.3)
MCHC RBC AUTO-ENTMCNC: 32.9 G/DL (ref 31.4–37.4)
MCV RBC AUTO: 94 FL (ref 82–98)
MONOCYTES # BLD AUTO: 1.11 THOUSAND/ΜL (ref 0.17–1.22)
MONOCYTES NFR BLD AUTO: 9 % (ref 4–12)
NEUTROPHILS # BLD AUTO: 7.68 THOUSANDS/ΜL (ref 1.85–7.62)
NEUTS SEG NFR BLD AUTO: 63 % (ref 43–75)
NRBC BLD AUTO-RTO: 0 /100 WBCS
PHOSPHATE SERPL-MCNC: 3.9 MG/DL (ref 2.3–4.1)
PLATELET # BLD AUTO: 201 THOUSANDS/UL (ref 149–390)
PMV BLD AUTO: 9.8 FL (ref 8.9–12.7)
POTASSIUM SERPL-SCNC: 4 MMOL/L (ref 3.5–5.3)
PROT SERPL-MCNC: 5.3 G/DL (ref 6.4–8.2)
PROTHROMBIN TIME: 14.9 SECONDS (ref 11.6–14.5)
RBC # BLD AUTO: 4.64 MILLION/UL (ref 3.88–5.62)
SODIUM SERPL-SCNC: 140 MMOL/L (ref 136–145)
UNIT DISPENSE STATUS: NORMAL
UNIT PRODUCT CODE: NORMAL
UNIT PRODUCT VOLUME: 350 ML
UNIT RH: NORMAL
WBC # BLD AUTO: 12.29 THOUSAND/UL (ref 4.31–10.16)

## 2022-06-27 PROCEDURE — 99223 1ST HOSP IP/OBS HIGH 75: CPT | Performed by: INTERNAL MEDICINE

## 2022-06-27 PROCEDURE — 85610 PROTHROMBIN TIME: CPT | Performed by: EMERGENCY MEDICINE

## 2022-06-27 PROCEDURE — 85014 HEMATOCRIT: CPT | Performed by: EMERGENCY MEDICINE

## 2022-06-27 PROCEDURE — 97162 PT EVAL MOD COMPLEX 30 MIN: CPT

## 2022-06-27 PROCEDURE — 85025 COMPLETE CBC W/AUTO DIFF WBC: CPT | Performed by: EMERGENCY MEDICINE

## 2022-06-27 PROCEDURE — C9113 INJ PANTOPRAZOLE SODIUM, VIA: HCPCS

## 2022-06-27 PROCEDURE — 85018 HEMOGLOBIN: CPT | Performed by: EMERGENCY MEDICINE

## 2022-06-27 PROCEDURE — 84100 ASSAY OF PHOSPHORUS: CPT | Performed by: EMERGENCY MEDICINE

## 2022-06-27 PROCEDURE — NC001 PR NO CHARGE: Performed by: INTERNAL MEDICINE

## 2022-06-27 PROCEDURE — 82948 REAGENT STRIP/BLOOD GLUCOSE: CPT

## 2022-06-27 PROCEDURE — 97166 OT EVAL MOD COMPLEX 45 MIN: CPT

## 2022-06-27 PROCEDURE — 80053 COMPREHEN METABOLIC PANEL: CPT | Performed by: EMERGENCY MEDICINE

## 2022-06-27 PROCEDURE — 85018 HEMOGLOBIN: CPT

## 2022-06-27 PROCEDURE — 99233 SBSQ HOSP IP/OBS HIGH 50: CPT | Performed by: INTERNAL MEDICINE

## 2022-06-27 PROCEDURE — 83735 ASSAY OF MAGNESIUM: CPT | Performed by: EMERGENCY MEDICINE

## 2022-06-27 PROCEDURE — 85014 HEMATOCRIT: CPT

## 2022-06-27 PROCEDURE — C9113 INJ PANTOPRAZOLE SODIUM, VIA: HCPCS | Performed by: EMERGENCY MEDICINE

## 2022-06-27 RX ORDER — PANTOPRAZOLE SODIUM 40 MG/10ML
40 INJECTION, POWDER, LYOPHILIZED, FOR SOLUTION INTRAVENOUS EVERY 12 HOURS SCHEDULED
Status: DISCONTINUED | OUTPATIENT
Start: 2022-06-27 | End: 2022-06-29 | Stop reason: HOSPADM

## 2022-06-27 RX ORDER — MAGNESIUM CARB/ALUMINUM HYDROX 105-160MG
296 TABLET,CHEWABLE ORAL ONCE
Status: DISCONTINUED | OUTPATIENT
Start: 2022-06-27 | End: 2022-06-29 | Stop reason: HOSPADM

## 2022-06-27 RX ORDER — METOCLOPRAMIDE HYDROCHLORIDE 5 MG/ML
10 INJECTION INTRAMUSCULAR; INTRAVENOUS EVERY 6 HOURS SCHEDULED
Status: DISCONTINUED | OUTPATIENT
Start: 2022-06-27 | End: 2022-06-27

## 2022-06-27 RX ADMIN — INSULIN LISPRO 1 UNITS: 100 INJECTION, SOLUTION INTRAVENOUS; SUBCUTANEOUS at 12:15

## 2022-06-27 RX ADMIN — POLYETHYLENE GLYCOL 3350, SODIUM SULFATE ANHYDROUS, SODIUM BICARBONATE, SODIUM CHLORIDE, POTASSIUM CHLORIDE 4000 ML: 236; 22.74; 6.74; 5.86; 2.97 POWDER, FOR SOLUTION ORAL at 20:39

## 2022-06-27 RX ADMIN — PANTOPRAZOLE SODIUM 40 MG: 40 INJECTION, POWDER, FOR SOLUTION INTRAVENOUS at 20:39

## 2022-06-27 RX ADMIN — PANTOPRAZOLE SODIUM 8 MG/HR: 40 INJECTION, POWDER, FOR SOLUTION INTRAVENOUS at 08:10

## 2022-06-27 RX ADMIN — METOCLOPRAMIDE HYDROCHLORIDE 10 MG: 5 INJECTION INTRAMUSCULAR; INTRAVENOUS at 08:11

## 2022-06-27 RX ADMIN — METOCLOPRAMIDE HYDROCHLORIDE 10 MG: 5 INJECTION INTRAMUSCULAR; INTRAVENOUS at 02:13

## 2022-06-27 RX ADMIN — PRAVASTATIN SODIUM 40 MG: 40 TABLET ORAL at 20:39

## 2022-06-27 NOTE — PROGRESS NOTES
INTERNAL MEDICINE RESIDENCY TRANSFER ACCEPTANCE NOTE     Name: Dominik Reed   Age & Sex: 79 y o  male   MRN: [id number]  Unit/Bed#: MICU 10   Encounter: [id number]  Hospital Stay Days: 1    Accepting team: SOD Team B   Code Status: Level 1 - Full Code  Disposition: Patient requires Med/Surg    ASSESSMENT/PLAN     Principal Problem:    GI bleed  Active Problems:    Mixed hyperlipidemia    Essential hypertension, benign    Anxiety disorder    Diabetes mellitus, type 2 ([address] )    Hypotension    Melena    Aspirin long-term use      * GI bleed  Assessment & Plan  Presented endorsing a one day hx of loose stools associated with bright red blood in toilet bowl and on paper  Had 1 x episode of incontinence of bright red blood / stool  Nil pain throughout  Felt dizzy and attended ED where questionable low BP en route  No alcohol / PMH  Does endorse taking 6 grms daily of cinnamon supplementation (GI irritant), and states 40-60 lbs intentional weight loss this past year   Given IVF, DDAVP and 1 pRBC unmatched on ED arrival  CT Bleed study: No evidence of active high volume gastrointestinal hemorrhage  Decompressed  hepatic flexure, this may represent normal peristalsis, however neoplasm is in the differential    Hb stable 14 4-->13 5 with P9aspkoc trending overnight  Vitals stable without any evidence of hemodynamic compromise   GI contacted whom advised: PPI, Reglan, monitoring in mICU overnight for rebleed  In view of stability may step-down to med/surg with plan for OGD and Colonscopy 6/28     - CBC switched to [id number]  - Clear diet until midnight then NPO pending scopes  - Pt/Ot   - Hold anticoagulation and provide SCD's  - Bowel prep as per GI expertise    Aspirin long-term use  Assessment & Plan  81mg daily  Appears to be taking this for primary prevention    DDAVP given x1 in ED 6/26     - Further dosing held whilst an inpatient due to GI bleed  - consider discontinuing at time of discharge    Diabetes mellitus, type 2 [facility])  Assessment & Plan  Lab Results   Component Value Date    HGBA1C 5 9 (H) 04/19/2022       Recent Labs     06/27/22  0013 06/27/22  0722 06/27/22  1212   POCGLU 141* 95 151*       Blood Sugar Average: Last 72 hrs:  (P) 129     Hold home regime of metformin 1000 mg b i d  And Trulicity once weekly (last taken Wednesday) 3 mg SC      - Continue correctional insulin sliding scale algorithm 3 whilst an inpatient    Anxiety disorder  Assessment & Plan  PCP has prescribed Xanax 0 5 mg b i d  P r n  For anxiety  Patient reports he takes this readily, with no doses taken in the past 2 weeks  No symptoms of anxiety at this time as per patient     - Hold prescribing at present    Essential hypertension, benign  Assessment & Plan  /80 at time of transfer  Asymptomatic  Currently holding home lisinopril 5 mg daily due to GI bleed    Mixed hyperlipidemia  Assessment & Plan  Continue with pravastatin 40 mg daily whilst an inpatient (simvastatin 20 mg HS at home)      VTE Pharmacologic Prophylaxis: RX contraindicated due to: GI bleed  VTE Mechanical Prophylaxis: sequential compression device    HOSPITAL COURSE     HPI per Dr Earnest Cho:  "73-yr-old M with history of DM, HTN, HLD on 81mg daily aspirin presents with a 1 day history of acute painless rectal bleeding x 3-4 episodes  Described as diarrhea mixed with bright blood then subsequently developed bright red blood per rectum for which he was incontinent with whilst driving  Dominik returned home and called for an ambulance  Whilst with EMS pt was noted to be hypotensive with systolic in 85'F (ISOLATED episode)  Patient noted to be incontinent again upon arrival by EMS bright red blood per rectum  1 Unit of uncrossed O+ RBC blood given in ED and NSS bolus of 1 L with good response with SBP>120  No CT evidence of active high volume gastrointestinal hemorrhage   Of note CT commented on a decompressed hepatic flexure of which a neoplasm is included in differential   No prior hx of  bleeds, no prior colonoscopy, no fam hx, no alcohol  Does endorse multiple daily suppliments with Mg (unquantified amount) , Cinnamon 6grms daily in capsules for >6months  Apple cider vinegar (though reports poor compliance past few weeks), as well as 'a vitamin'      GI were made aware of Dominik, and overnight in view of unremarkable imaging, stable Hb, vitals and lack of further GI bleed he was admitted to MICU for monitoring, PPI infusion, H&H trending and vitals monitored and no change  He was seen by GI in the morning who wish to perform Upper and lower GI scopes tomorrow (6/28) and he'll need prep later today  As Dominik has been stable since admission without bleeding, significant drop in Hb, pain or hemodynamic compromise both ICU and GI teams are happy for stepdown to med surg pending procedures tomorrow "    SUBJECTIVE     Patient assessed at bedside  No acute complaints  No further episodes of diarrhea or bloody bowel movement since admission  Plan for EGD/Colonoscopy tomorrow  Offered nicotine patch however patient refused  OBJECTIVE     Vitals:    06/27/22 0900 06/27/22 1031 06/27/22 1040 06/27/22 1043   BP: 124/75 125/80 138/96 142/96   BP Location:  Right arm Right arm    Pulse: 74 90 92 92   Resp:       Temp:       TempSrc:       SpO2: 95% 95% 93% 93%   Weight:       Height:         I/O last 24 hours: In: 889 3 [I V :709 3; IV Piggyback:180]  Out: 1025 [Urine:1025]    Physical Exam  Vitals reviewed  Constitutional:       General: He is not in acute distress  Appearance: Normal appearance  He is not ill-appearing  HENT:      Head: Normocephalic and atraumatic  Mouth/Throat:      Mouth: Mucous membranes are moist       Pharynx: Oropharynx is clear  Eyes:      General: No scleral icterus  Extraocular Movements: Extraocular movements intact        Conjunctiva/sclera: Conjunctivae normal    Cardiovascular:      Rate and Rhythm: Normal rate and regular rhythm  Heart sounds: Normal heart sounds  No murmur heard  Pulmonary:      Effort: Pulmonary effort is normal  No respiratory distress  Breath sounds: No wheezing  Abdominal:      General: Bowel sounds are normal       Palpations: Abdomen is soft  Tenderness: There is no abdominal tenderness  Musculoskeletal:         General: Normal range of motion  Cervical back: Normal range of motion  Right lower leg: No edema  Left lower leg: No edema  Skin:     General: Skin is warm and dry  Neurological:      Mental Status: He is alert and oriented to person, place, and time  Mental status is at baseline  Psychiatric:         Mood and Affect: Mood normal          Behavior: Behavior normal        LABORATORY DATA     Labs: I have personally reviewed pertinent reports  Results from last 7 days   Lab Units 06/27/22  0811 06/27/22  0505 06/27/22  0017 06/26/22 1935 06/26/22 1934   WBC Thousand/uL  --  12 29*  --   --  16 54*   HEMOGLOBIN g/dL 13 5 14 4 15 0  --  14 7   I STAT HEMOGLOBIN   --   --   --    < >  --    HEMATOCRIT % 42 0 43 8 45 3  --  44 8   HEMATOCRIT, ISTAT   --   --   --    < >  --    PLATELETS Thousands/uL  --  201  --   --  268   NEUTROS PCT %  --  63  --   --   --    MONOS PCT %  --  9  --   --   --    MONO PCT %  --   --   --   --  7    < > = values in this interval not displayed        Results from last 7 days   Lab Units 06/27/22 0505 06/26/22 1935 06/26/22 1934   POTASSIUM mmol/L 4 0  --  4 0   CHLORIDE mmol/L 110*  --  110*   CO2 mmol/L 24  --  24   CO2, I-STAT mmol/L  --  27  --    BUN mg/dL 18  --  17   CREATININE mg/dL 0 70  --  0 92   CALCIUM mg/dL 8 2*  --  8 1*   ALK PHOS U/L 32*  --  35*   ALT U/L 20  --  22   AST U/L 15  --  12   GLUCOSE, ISTAT mg/dl  --  177*  --      Results from last 7 days   Lab Units 06/27/22  0505   MAGNESIUM mg/dL 1 9     Results from last 7 days   Lab Units 06/27/22  0505   PHOSPHORUS mg/dL 3 9      Results from last 7 days Lab Units 06/27/22  0505 06/26/22  1934   INR  1 23* 1 17   PTT seconds  --  23             Micro:  No results found for: LISET Cristina, 2025 [facility] TESTING     Imaging: I have personally reviewed pertinent reports  CT high volume lower GI bleed    Result Date: 6/26/2022  Impression: 1  No CT evidence of active high volume gastrointestinal hemorrhage  2   Relatively decompressed appearance to the hepatic flexure, this may represent normal peristalsis, however neoplasm is in the differential   Recommend colonoscopy if not performed within the last 5 years  Workstation performed: [id number]     EKG, Pathology, and Other Studies: I have personally reviewed pertinent reports  ALLERGIES   No Known Allergies  MEDICATIONS     Current Facility-Administered Medications   Medication Dose Route Frequency Provider Last Rate    insulin lispro  1-6 Units Subcutaneous Q6H 148 [facility] MD Kassie      pantoprozole (PROTONIX) infusion (Continuous)  8 mg/hr Intravenous Continuous [address] MD 8 mg/hr (06/27/22 0810)    pravastatin  40 mg Oral Daily With Krishna rAndt MD       pantoprozole (PROTONIX) infusion (Continuous), 8 mg/hr, Last Rate: 8 mg/hr (06/27/22 0810)           Portions of the record may have been created with voice recognition software  Occasional wrong word or "sound a like" substitutions may have occurred due to the inherent limitations of voice recognition software    Read the chart carefully and recognize, using context, where substitutions have occurred     ==  Gali Ryan, [facility]  Internal Medicine Residency PGY-2

## 2022-06-27 NOTE — PLAN OF CARE
Problem: Potential for Falls  Goal: Patient will remain free of falls  Description: INTERVENTIONS:  - Educate patient/family on patient safety including physical limitations  - Instruct patient to call for assistance with activity   - Consult OT/PT to assist with strengthening/mobility   - Keep Call bell within reach  - Keep bed low and locked with side rails adjusted as appropriate  - Keep care items and personal belongings within reach  - Initiate and maintain comfort rounds  - Make Fall Risk Sign visible to sta- Offer Toileting every  Hours, in advance of need  - Initiate/Maintain alarm  - Obtain necessary fall risk management equipment:   - Apply yellow socks and bracelet for high fall risk patients  - Consider moving patient to room near nurses station  Outcome: Progressing     Problem: MOBILITY - ADULT  Goal: Maintain or return to baseline ADL function  Description: INTERVENTIONS:  -  Assess patient's ability to carry out ADLs; assess patient's baseline for ADL function and identify physical deficits which impact ability to perform ADLs (bathing, care of mouth/teeth, toileting, grooming, dressing, etc )  - Assess/evaluate cause of self-care deficits   - Assess range of motion  - Assess patient's mobility; develop plan if impaired  - Assess patient's need for assistive devices and provide as appropriate  - Encourage maximum independence but intervene and supervise when necessary  - Involve family in performance of ADLs  - Assess for home care needs following discharge   - Consider OT consult to assist with ADL evaluation and planning for discharge  - Provide patient education as appropriate  Outcome: Progressing  Goal: Maintains/Returns to pre admission functional level  Description: INTERVENTIONS:  - Perform BMAT or MOVE assessment daily    - Set and communicate daily mobility goal to care team and patient/family/caregiver     - Collaborate with rehabilitation services on mobility goals if consulted  - Perform Range of Motion  times a day  - Reposition patient every  hours    - Dangle patient  times a day  - Stand patient  times a day  - Ambulate patient  times a day  - Out of bed to chair  times a day   - Out of bed for meals times a day  - Out of bed for toileting  - Record patient progress and toleration of activity level   Outcome: Progressing     Problem: Prexisting or High Potential for Compromised Skin Integrity  Goal: Skin integrity is maintained or improved  Description: INTERVENTIONS:  - Identify patients at risk for skin breakdown  - Assess and monitor skin integrity  - Assess and monitor nutrition and hydration status  - Monitor labs   - Assess for incontinence   - Turn and reposition patient  - Assist with mobility/ambulation  - Relieve pressure over bony prominences  - Avoid friction and shearing  - Provide appropriate hygiene as needed including keeping skin clean and dry  - Evaluate need for skin moisturizer/barrier cream  - Collaborate with interdisciplinary team   - Patient/family teaching  - Consider wound care consult   Outcome: Progressing     Problem: CARDIOVASCULAR - ADULT  Goal: Maintains optimal cardiac output and hemodynamic stability  Description: INTERVENTIONS:  - Monitor I/O, vital signs and rhythm  - Monitor for S/S and trends of decreased cardiac output  - Administer and titrate ordered vasoactive medications to optimize hemodynamic stability  - Assess quality of pulses, skin color and temperature  - Assess for signs of decreased coronary artery perfusion  - Instruct patient to report change in severity of symptoms  Outcome: Progressing  Goal: Absence of cardiac dysrhythmias or at baseline rhythm  Description: INTERVENTIONS:  - Continuous cardiac monitoring, vital signs, obtain 12 lead EKG if ordered  - Administer antiarrhythmic and heart rate control medications as ordered  - Monitor electrolytes and administer replacement therapy as ordered  Outcome: Progressing     Problem: GASTROINTESTINAL - ADULT  Goal: Minimal or absence of nausea and/or vomiting  Description: INTERVENTIONS:  - Administer IV fluids if ordered to ensure adequate hydration  - Maintain NPO status until nausea and vomiting are resolved  - Nasogastric tube if ordered  - Administer ordered antiemetic medications as needed  - Provide nonpharmacologic comfort measures as appropriate  - Advance diet as tolerated, if ordered  - Consider nutrition services referral to assist patient with adequate nutrition and appropriate food choices  Outcome: Progressing  Goal: Maintains or returns to baseline bowel function  Description: INTERVENTIONS:  - Assess bowel function  - Encourage oral fluids to ensure adequate hydration  - Administer IV fluids if ordered to ensure adequate hydration  - Administer ordered medications as needed  - Encourage mobilization and activity  - Consider nutritional services referral to assist patient with adequate nutrition and appropriate food choices  Outcome: Progressing  Goal: Maintains adequate nutritional intake  Description: INTERVENTIONS:  - Monitor percentage of each meal consumed  - Identify factors contributing to decreased intake, treat as appropriate  - Assist with meals as needed  - Monitor I&O, weight, and lab values if indicated  - Obtain nutrition services referral as needed  Outcome: Progressing  Goal: Oral mucous membranes remain intact  Description: INTERVENTIONS  - Assess oral mucosa and hygiene practices  - Implement preventative oral hygiene regimen  - Implement oral medicated treatments as ordered  - Initiate Nutrition services referral as needed  Outcome: Progressing     Problem: METABOLIC, FLUID AND ELECTROLYTES - ADULT  Goal: Electrolytes maintained within normal limits  Description: INTERVENTIONS:  - Monitor labs and assess patient for signs and symptoms of electrolyte imbalances  - Administer electrolyte replacement as ordered  - Monitor response to electrolyte replacements, including repeat lab results as appropriate  - Instruct patient on fluid and nutrition as appropriate  Outcome: Progressing  Goal: Fluid balance maintained  Description: INTERVENTIONS:  - Monitor labs   - Monitor I/O and WT  - Instruct patient on fluid and nutrition as appropriate  - Assess for signs & symptoms of volume excess or deficit  Outcome: Progressing  Goal: Glucose maintained within target range  Description: INTERVENTIONS:  - Monitor Blood Glucose as ordered  - Assess for signs and symptoms of hyperglycemia and hypoglycemia  - Administer ordered medications to maintain glucose within target range  - Assess nutritional intake and initiate nutrition service referral as needed  Outcome: Progressing     Problem: MUSCULOSKELETAL - ADULT  Goal: Maintain or return mobility to safest level of function  Description: INTERVENTIONS:  - Assess patient's ability to carry out ADLs; assess patient's baseline for ADL function and identify physical deficits which impact ability to perform ADLs (bathing, care of mouth/teeth, toileting, grooming, dressing, etc )  - Assess/evaluate cause of self-care deficits   - Assess range of motion  - Assess patient's mobility  - Assess patient's need for assistive devices and provide as appropriate  - Encourage maximum independence but intervene and supervise when necessary  - Involve family in performance of ADLs  - Assess for home care needs following discharge   - Consider OT consult to assist with ADL evaluation and planning for discharge  - Provide patient education as appropriate  Outcome: Progressing

## 2022-06-27 NOTE — PHYSICAL THERAPY NOTE
Physical Therapy evaluation note     Patient Name: Dominik Reed    Today's Date: 6/27/2022     Problem List  Principal Problem:    GI bleed  Active Problems:    Mixed hyperlipidemia    Essential hypertension, benign    Anxiety disorder    Diabetes mellitus, type 2 (HCC)    Hypotension    Melena    Aspirin long-term use       Past Medical History  Past Medical History:   Diagnosis Date    Acquired hypothyroidism     Anxiety     Anxiety disorder     Cigarette smoker     Colon cancer screening declined     does understand the risk of missing colon cancer - As per [employer]    Diabetes mellitus ([facility] [address] )     Diabetes mellitus, type 2 ([facility] )     Dyslipidemia     Essential hypertension, benign     Hyperlipidemia     Hypertension     Kidney stone     Type II diabetes mellitus, uncontrolled         Past Surgical History  Past Surgical History:   Procedure Laterality Date    CARDIAC CATHETERIZATION      1/19/09 no obstructive- Dr Fitzpatrick Argue      ? 2006 s/p stone removal           06/27/22 1055   PT Last Visit   PT Visit Date 06/27/22   Note Type   Note type Evaluation   Pain Assessment   Pain Assessment Tool 0-10   Pain Score No Pain   Restrictions/Precautions   Weight Bearing Precautions Per Order No   Other Precautions Multiple lines   Home Living   Type of Home House   Home Layout Two level   Additional Comments PT lives in a [facility] with [facility]  Pt has a full flight of steps to the second floor  Pt was I for ADL's and mobility pior  Pt does not use any DME and owns w/c  Per patient he lives with his wife and family member (who he is caring for) at home  Pt drives  Pt is retired   Prior Function   Level of [place] Independent with ADLs and functional mobility   Lives With Spouse; Family   Receives Help From Family   ADL Assistance Independent   IADLs Independent   Vocational Retired   Comments +drives   General   Family/Caregiver Present No   Cognition Overall Cognitive Status WFL   Arousal/Participation Alert   Memory Within functional limits   RLE Assessment   RLE Assessment   (grossly 3/5 observed with mobility)   LLE Assessment   LLE Assessment   (grossly 3/5 observed with mobility)   Bed Mobility   Supine to Sit 6  Modified independent   Additional items HOB elevated   Sit to Supine 6  Modified independent   Additional items HOB elevated   Additional Comments sitting EOB I level   Transfers   Sit to Stand 7  Independent   Stand to Sit 7  Independent   Ambulation/Elevation   Gait pattern WNL   Gait Assistance 7  Independent   Assistive Device None   Distance [id number], [id number]   Stair Management Assistance 5  Supervision   Stair Management Technique One rail R   Number of Stairs 3  (limitd by lines)   Balance   Static Sitting Normal   Dynamic Sitting Good   Static Standing Fair +   Dynamic Standing Fair +   Ambulatory Fair +   Endurance Deficit   Endurance Deficit No   Activity Tolerance   Activity Tolerance Patient tolerated treatment well   Medical Staff Made Aware OT   Nurse Made Aware nurse approved therapy session   Assessment   Prognosis Excellent   Problem List Decreased mobility   Assessment Pt is a 78 yo male admitted to [place] [address] on 6/26/2022 s/pacute rectal bleeding  DX: HTN, HLD, rectal bleeding, DM  Two patient identifiers were used to confirm  Pt lives in a [facility] [address] with no NICOLA  Pt was I for ADL's and mobility prior  Pt lives with his spouse and ARGELIA  Pt and his wife care for his ARGELIA  Pt is retired and drives  Pt's impairments include reduced mobility  Recommend discharge to home with no needs  At the end of the session the patient was left in seated position with call bell and phone within reach  The patient's AM-PAC Basic Mobility Inpatient Short Form Raw Score is 23  A Raw score of greater than 16 suggests the patient may benefit from discharge to home  Please also refer to the recommendation of the Physical Therapist for safe discharge planning   Pt is near his baseline for mobility  Will D/C from PT     Recommendation   PT Discharge Recommendation No rehabilitation needs   AM-PAC Basic Mobility Inpatient   Turning in Bed Without Bedrails 4   Lying on Back to Sitting on Edge of Flat Bed 4   Moving Bed to Chair 4   Standing Up From Chair 4   Walk in Room 4   Climb 3-5 Stairs 3   Basic Mobility Inpatient Raw Score 23   Basic Mobility Standardized Score 50 88   Highest Level Of Mobility   JH-HLM Goal 7: Walk 25 feet or more   JH-HLM Achieved 7: Walk 25 feet or more   Armand Grimm, PT, DPT

## 2022-06-27 NOTE — ASSESSMENT & PLAN NOTE
Presented endorsing a one day hx of loose stools associated with bright red blood in toilet bowl and on paper  Had 1 x episode of incontinence of bright red blood / stool  Nil pain throughout  Felt dizzy and attended ED where questionable low BP en route  No alcohol / PMH  Does endorse taking 6 grms daily of cinnamon supplementation (GI irritant), and states 40-60 lbs intentional weight loss this past year   Given IVF, DDAVP and 1 pRBC unmatched on ED arrival  CT Bleed study: No evidence of active high volume gastrointestinal hemorrhage  Decompressed  hepatic flexure, this may represent normal peristalsis, however neoplasm is in the differential    Hb stable 14 4-->13 5 with [id number] trending overnight  Vitals stable without any evidence of hemodynamic compromise        - Hgb remained stable  - EGD & Colonoscopy showed polyp in esophagus, sub-centimeter cecal polyp, diverticulosis  Both polyps were Bx'd, and 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

## 2022-06-27 NOTE — ED ATTENDING ATTESTATION
6/26/2022  I, Terrace Dancer, MD, saw and evaluated the patient  I have discussed the patient with the resident/non-physician practitioner and agree with the resident's/non-physician practitioner's findings, Plan of Care, and MDM as documented in the resident's/non-physician practitioner's note, except where noted  All available labs and Radiology studies were reviewed  I was present for key portions of any procedure(s) performed by the resident/non-physician practitioner and I was immediately available to provide assistance  At this point I agree with the current assessment done in the Emergency Department  I have conducted an independent evaluation of this patient a history and physical is as follows:    ED Course    patient is a 19-year-old male with history of diabetes mellitus on baby aspirin daily presents with a 1 day history of acute rectal bleeding  Patient describes initial episodes as diarrhea mixed with blood then subsequently developed bright red blood per rectum  Patient noted to be incontinent upon arrival by EMS bright red blood per rectum blood clots an obvious melena  Patient noted to be hypotensive per EMS in the field with symptoms  Vital signs reviewed  Patient alert oriented pale conjunctiva  Patient exposed noted to have obvious melena in undergarments  Given hypotension patient was given 1 unit of uncross matched blood upon arrival as well as normal saline bolus with repeat blood pressure to 130's given volume of bleeding, patient taken for high output GI bleeding study to identify possible source Protonix drip ordered  Screening labs ordered  I-STAT ordered    Abdomen soft nontender nondistended normal bowel sounds  Impression:  Acute GI bleed with hypotension  CT GI bleeding study reviewed radiology case discussed with Gastroenterology as well as Critical Care will place patient in ICU    PPI drip DDAVP transfuse for further episodes of bleeding hypotension or drop in hemoglobin  IV metoclopramide prep ordered  NPO for planned GI procedure in a m  Critical Care Time  CriticalCare Time  Performed by: Radha Rizvi MD  Authorized by: Radha Rizvi MD     Critical care provider statement:     Critical care time (minutes):  37    Critical care time was exclusive of:  Separately billable procedures and treating other patients and teaching time    Critical care was necessary to treat or prevent imminent or life-threatening deterioration of the following conditions: Acute GI bleed with hypotension      Critical care was time spent personally by me on the following activities:  Obtaining history from patient or surrogate, development of treatment plan with patient or surrogate, discussions with consultants, evaluation of patient's response to treatment, examination of patient, ordering and performing treatments and interventions, ordering and review of laboratory studies, ordering and review of radiographic studies and re-evaluation of patient's condition    I assumed direction of critical care for this patient from another provider in my specialty: no

## 2022-06-27 NOTE — PROGRESS NOTES
Code Status: Level 1 - Full Code  * GI bleed  Assessment & Plan  Presented endorsing a one day hx of loose stools associated with bright red blood in toilet bowl and on paper  Had 1 x episode of incontinence of bright red blood / stool  Nil pain throughout  Felt dizzy and attended ED where questionable low BP en route  No alcohol / PMH  Does endorse taking 6 grms daily of cinnamon supplementation (GI irritant), and states 40-60 lbs intentional weight loss this past year   Given IVF, DDAVP and 1 pRBC unmatched on ED arrival  CT Bleed study: No evidence of active high volume gastrointestinal hemorrhage  Decompressed  hepatic flexure, this may represent normal peristalsis, however neoplasm is in the differential    Hb stable 14 4-->13 5 with U2cumrsr trending overnight  Vitals stable without any evidence of hemodynamic compromise   GI contacted whom advised: PPI, Reglan, monitoring in mICU overnight for rebleed  In view of stability may step-down to med/surg with plan for OGD and Colonscopy 6/28    - CBC switched to [id number]  - Clear diet until midnight then NPO pending scopes  - Pt/Ot   - Hold anticoagulation and provide SCD's  - Bowel prep as per GI expertise    Aspirin long-term use  Assessment & Plan  81mg daily  DDAVP given x1 in ED 6/26    - Further dosing held whilst an inpatient due to GI bleed    Diabetes mellitus, type 2 [facility])  Assessment & Plan  Lab Results   Component Value Date    HGBA1C 5 9 (H) 04/19/2022       Recent Labs     06/27/22  0013 06/27/22  0722 06/27/22  1212   POCGLU 141* 95 151*   Hold home regime of metformin 1000 mg b i d  And Trulicity once weekly (last taken Wednesday) 3 mg SC     - Continue correctional insulin sliding scale algorithm 3 whilst an inpatient    Anxiety disorder  Assessment & Plan  PCP has prescribed Xanax 0 5 mg b i d  P r n  For anxiety  Patient reports he takes this readily, with no doses taken in the past 2 weeks    No symptoms of anxiety at this time as per patient    - Hold prescribing at present    Essential hypertension, benign  Assessment & Plan  /80 at time of transfer  Asymptomatic  Currently holding home lisinopril 5 mg daily due to GI bleed      Mixed hyperlipidemia  Assessment & Plan  Continue with pravastatin 40 mg daily whilst an inpatient (simvastatin 20 mg HS at home)  Reason for ICU admission:   GI bleed requiring monitoring    Active problems:   Principal Problem:    GI bleed  Active Problems:    Mixed hyperlipidemia    Essential hypertension, benign    Anxiety disorder    Diabetes mellitus, type 2 (HCC)    Hypotension    Melena    Aspirin long-term use  Resolved Problems:    * No resolved hospital problems  *  Consultants:   Gastroenterology     Summary of clinical course:   73-yr-old M with history of DM, HTN, HLD on 81mg daily aspirin presents with a 1 day history of acute painless rectal bleeding x 3-4 episodes  Described as diarrhea mixed with bright blood then subsequently developed bright red blood per rectum for which he was incontinent with whilst driving  Dominik returned home and called for an ambulance  Whilst with EMS pt was noted to be hypotensive with systolic in 66'G (ISOLATED episode)  Patient noted to be incontinent again upon arrival by EMS bright red blood per rectum  1 Unit of uncrossed O+ RBC blood given in ED and NSS bolus of 1 L with good response with SBP>120  No CT evidence of active high volume gastrointestinal hemorrhage  Of note CT commented on a decompressed hepatic flexure of which a neoplasm is included in differential   No prior hx of  bleeds, no prior colonoscopy, no fam hx, no alcohol  Does endorse multiple daily suppliments with Mg (unquantified amount) , Cinnamon 6grms daily in capsules for >6months  Apple cider vinegar (though reports poor compliance past few weeks), as well as 'a vitamin'      GI were made aware of Dominik, and overnight in view of unremarkable imaging, stable Hb, vitals and lack of further GI bleed he was admitted to MICU for monitoring, PPI infusion, H&H trending and vitals monitored and no change  He was seen by GI in the morning who wish to perform Upper and lower GI scopes tomorrow (6/28) and he'll need prep later today  As Dominik has remained stable since admission without bleeding, significant drop in Hb, pain or hemodynamic compromise both ICU and GI teams are happy for stepdown to [facility] surg pending procedures tomorrow  Recent or scheduled procedures:   Upper and Lower GI scopes    Nutrition Plan:   Clear liquids until midnight 6/28 when switch to NPO    Invasive Devices Review  Invasive Devices  Report    Peripheral Intravenous Line  Duration           Peripheral IV 06/26/22 Left Antecubital <1 day    Peripheral IV 06/27/22 Dorsal (posterior); Left Forearm <1 day    Peripheral IV 06/27/22 Dorsal (posterior); Right Forearm <1 day            VTE Pharmacologic Prophylaxis: Pharmacologic VTE Prophylaxis contraindicated due to GI bleed pending intervention   VTE Mechanical Prophylaxis: sequential compression device    Discharge Plan:   Patient should be ready for discharge to [facility] on 6/27 after 10:00AM  Initial Physical Therapy Recommendations: Pending  Initial Occupational Therapy Recommendations: Pending  Initial /Plan: Pending  Home medications that are not reordered and reason why:   ACE-i due to reported hypotension  Aspirin daily due to presenting complainty  Metformin and Trulicity held    Spoke with SOD resident  Accepted by [facility] B regarding transfer  Please contact critical care via [employer] with any questions or concerns  Portions of the record may have been created with voice recognition software  Occasional wrong word or "sound a like" substitutions may have occurred due to the inherent limitations of voice recognition software  Read the chart carefully and recognize, using context, where substitutions have occurred

## 2022-06-27 NOTE — ASSESSMENT & PLAN NOTE
Lab Results   Component Value Date    HGBA1C 5 9 (H) 04/19/2022       Recent Labs     06/27/22  2339 06/28/22  0528 06/28/22  1156 06/28/22  1759   POCGLU 91 111 106 82       Blood Sugar Average: Last 72 hrs:  (P) 107 875     Hold home regime of metformin 1000 mg b i d   And Trulicity once weekly (last taken Wednesday) 3 mg SC      - Continue correctional insulin sliding scale algorithm 3 whilst an inpatient

## 2022-06-27 NOTE — ED PROVIDER NOTES
History  Chief Complaint   Patient presents with    Rectal Bleeding     Pt [facility] with c/o acute onset rectal hemmorage  BP 86 for EMS  Pt CA&Ox4     HPI     60-year-old male with past medical history diabetes, hypertension, and hyperlipidemia presents for evaluation of GI bleeding  Patient states he was having some bloody diarrhea earlier today  A few hours ago, he started to have large volume of dark red bloody bowel movements  He was initially hypotensive for EMS to 80 systolic  Patient does endorse lightheadedness  He denies any abdominal pain  Denies fevers or chills  Denies chest pain or shortness of breath  Denies history of GI bleeding  Denies history or cirrhosis  Patient initially reported that he is not on any anticoagulants or antiplatelets but later his wife confirmed that he is on asa daily  Prior to Admission Medications   Prescriptions Last Dose Informant Patient Reported? Taking?    ALPRAZolam (XANAX) 0 5 mg tablet  Self No No   Sig: TAKE 1 TABLET BY MOUTH TWICE A DAY AS NEEDED FOR ANXIETY   APPLE CIDER VINEGAR PO  Self Yes No   Sig: Take by mouth 2 daily   Patient not taking: Reported on 12/17/2021    Blood Glucose Monitoring Suppl (ONE TOUCH ULTRA 2) w/Device KIT  Self No No   Sig: CONTOUR METER, USE 3 TIMES A DAY E11 65   CINNAMON PO  Self Yes No   Sig: Take by mouth   Dulaglutide (Trulicity) 3 PZ/5 3ZH SOPN   No No   Sig: Inject 0 5 mL (3 mg total) under the skin once a week   Lancets MISC  Self No No   Sig: Use 3 (three) times a day Contour lancets E11 65   OneTouch Ultra test strip  Self No No   Sig: USE 1 EACH 3 (THREE) TIMES A DAY USE AS INSTRUCTED CONTOUR TEST STRIPS E11 65   aspirin (ECOTRIN LOW STRENGTH) 81 mg EC tablet  Self Yes No   Sig: Take 81 mg by mouth daily   Patient not taking: Reported on 12/17/2021    lisinopril (ZESTRIL) 5 mg tablet  Self No No   Sig: Take 1 tablet (5 mg total) by mouth daily   metFORMIN (GLUCOPHAGE) 1000 MG tablet  Self No No Sig: TAKE 1 TABLET BY MOUTH TWICE A DAY WITH MEALS   patient supplied medication  Self Yes No   Sig: Multi-Betic vitamin     simvastatin (ZOCOR) 20 mg tablet  Self No No   Sig: TAKE 1 TABLET BY MOUTH EVERYDAY AT BEDTIME      Facility-Administered Medications: None       Past Medical History:   Diagnosis Date    Acquired hypothyroidism     Anxiety     Anxiety disorder     Cigarette smoker     Colon cancer screening declined     does understand the risk of missing colon cancer - As per [employer]    Diabetes mellitus ([facility] [address] )     Diabetes mellitus, type 2 ([facility] [address] )     Dyslipidemia     Essential hypertension, benign     Hyperlipidemia     Hypertension     Kidney stone     Type II diabetes mellitus, uncontrolled        Past Surgical History:   Procedure Laterality Date    CARDIAC CATHETERIZATION      1/19/09 no obstructive- Dr Raul Atkinson      ? 2006 s/p stone removal         Family History   Problem Relation Age of Onset    Heart disease Father     Heart attack Father      I have reviewed and agree with the history as documented  E-Cigarette/Vaping    E-Cigarette Use Never User      E-Cigarette/Vaping Substances    Nicotine No     THC No     CBD No     Flavoring No     Other No     Unknown No      Social History     Tobacco Use    Smoking status: Former Smoker     Packs/day: 0 50     Years: 5 00     Pack years: 2 50     Quit date: 9/16/2018     Years since quitting: 3 7    Smokeless tobacco: Never Used   Vaping Use    Vaping Use: Never used   Substance Use Topics    Alcohol use: Yes     Alcohol/week: 1 0 standard drink     Types: 1 Glasses of wine per week     Comment: rare    Drug use: No        Review of Systems   Constitutional: Negative for appetite change, chills and fever  HENT: Negative for congestion, rhinorrhea and sore throat  Respiratory: Negative for cough and shortness of breath  Cardiovascular: Negative for chest pain     Gastrointestinal: Positive for blood in stool and diarrhea  Negative for abdominal pain, constipation, nausea and vomiting  Genitourinary: Negative for dysuria, frequency, hematuria and urgency  Musculoskeletal: Negative for arthralgias and myalgias  Skin: Negative for color change and rash  Neurological: Positive for dizziness and light-headedness  Negative for weakness, numbness and headaches  All other systems reviewed and are negative  Physical Exam  ED Triage Vitals   Temperature Pulse Respirations Blood Pressure SpO2   06/26/22 1943 06/26/22 1938 06/26/22 1938 06/26/22 1938 06/26/22 1938   98 1 °F (36 7 °C) 84 16 135/83 96 %      Temp Source Heart Rate Source Patient Position - Orthostatic VS BP Location FiO2 (%)   06/26/22 1943 06/26/22 1938 06/27/22 1031 06/26/22 2104 --   Oral Monitor Lying - Orthostatic VS Right arm       Pain Score       06/26/22 2127       No Pain             Orthostatic Vital Signs  Vitals:    06/28/22 0717 06/28/22 1224 06/28/22 1326 06/28/22 1341   BP: 158/90 137/86 (!) 86/51 100/56   Pulse: 79 72 87 85   Patient Position - Orthostatic VS:           Physical Exam  Vitals and nursing note reviewed  Exam conducted with a chaperone present  Constitutional:       General: He is in acute distress  Appearance: Normal appearance  He is well-developed and normal weight  He is ill-appearing  He is not toxic-appearing or diaphoretic  HENT:      Head: Normocephalic and atraumatic  Right Ear: External ear normal       Left Ear: External ear normal       Nose: Nose normal       Mouth/Throat:      Mouth: Mucous membranes are moist       Pharynx: Oropharynx is clear  Eyes:      Extraocular Movements: Extraocular movements intact  Conjunctiva/sclera: Conjunctivae normal    Cardiovascular:      Rate and Rhythm: Normal rate and regular rhythm  Pulses: Normal pulses  Heart sounds: Normal heart sounds  No murmur heard  No friction rub  No gallop     Pulmonary:      Effort: Pulmonary effort is normal  No respiratory distress  Breath sounds: Normal breath sounds  No wheezing or rales  Abdominal:      General: There is no distension  Palpations: Abdomen is soft  Tenderness: There is no abdominal tenderness  There is no guarding or rebound  Genitourinary:     Comments: Incontinent of dark maroon stool with some clots  Musculoskeletal:         General: No tenderness  Cervical back: Neck supple  Skin:     General: Skin is warm and dry  Coloration: Skin is pale  Findings: No rash  Neurological:      General: No focal deficit present  Mental Status: He is alert and oriented to person, place, and time  Cranial Nerves: No cranial nerve deficit  Sensory: No sensory deficit  Motor: No weakness     Psychiatric:         Mood and Affect: Mood normal          Behavior: Behavior normal          ED Medications  Medications   insulin lispro (HumaLOG) 100 units/mL subcutaneous injection 1-6 Units (1 Units Subcutaneous Not Given 6/28/22 1251)   pravastatin (PRAVACHOL) tablet 40 mg (40 mg Oral Given 6/27/22 2039)   magnesium citrate (CITROMA) oral solution 296 mL (296 mL Oral Refused 6/27/22 2032)   bisacodyl (DULCOLAX) EC tablet 20 mg (20 mg Oral Refused 6/27/22 2032)   pantoprazole (PROTONIX) injection 40 mg (40 mg Intravenous Not Given 6/28/22 1252)   nicotine (NICODERM CQ) 21 mg/24 hr TD 24 hr patch 21 mg (has no administration in time range)   pantoprazole (PROTONIX) 80 mg in sodium chloride 0 9 % 100 mL IVPB (0 mg Intravenous Stopped 6/26/22 2029)   iohexol (OMNIPAQUE) 350 MG/ML injection (MULTI-DOSE) 65 mL (65 mL Intravenous Given 6/26/22 1954)   metoclopramide (REGLAN) injection 10 mg (10 mg Intravenous Given 6/27/22 0811)   desmopressin (DDAVP) 32 mcg in sodium chloride 0 9 % 50 mL IVPB (0 mcg Intravenous Stopped 6/26/22 2239)   polyethylene glycol (GOLYTELY) bowel prep 4,000 mL (4,000 mL Oral Given 6/27/22 2039)   bisacodyl (DULCOLAX) EC tablet 20 mg (20 mg Oral Given 6/28/22 0526)       Diagnostic Studies  Results Reviewed     Procedure Component Value Units Date/Time    Hemoglobin and hematocrit, blood [[id number]]  (Normal) Collected: 06/27/22 0811    Lab Status: Final result Specimen: Blood from Arm, Right Updated: 06/27/22 0825     Hemoglobin 13 5 g/dL      Hematocrit 42 0 %     Comprehensive metabolic panel [[id number]]  (Abnormal) Collected: 06/27/22 0505    Lab Status: Final result Specimen: Blood from Arm, Right Updated: 06/27/22 0551     Sodium 140 mmol/L      Potassium 4 0 mmol/L      Chloride 110 mmol/L      CO2 24 mmol/L      ANION GAP 6 mmol/L      BUN 18 mg/dL      Creatinine 0 70 mg/dL      Glucose 109 mg/dL      Calcium 8 2 mg/dL      Corrected Calcium 9 2 mg/dL      AST 15 U/L      ALT 20 U/L      Alkaline Phosphatase 32 U/L      Total Protein 5 3 g/dL      Albumin 2 8 g/dL      Total Bilirubin 0 44 mg/dL      eGFR 97 ml/min/1 73sq m     Narrative:      [place] guidelines for Chronic Kidney Disease (CKD):     Stage 1 with normal or high GFR (GFR > 90 mL/min/1 73 square meters)    Stage 2 Mild CKD (GFR = 60-89 mL/min/1 73 square meters)    Stage 3A Moderate CKD (GFR = 45-59 mL/min/1 73 square meters)    Stage 3B Moderate CKD (GFR = 30-44 mL/min/1 73 square meters)    Stage 4 Severe CKD (GFR = 15-29 mL/min/1 73 square meters)    Stage 5 End Stage CKD (GFR <15 mL/min/1 73 square meters)  Note: GFR calculation is accurate only with a steady state creatinine    Protime-INR [[id number]]  (Abnormal) Collected: 06/27/22 0505    Lab Status: Final result Specimen: Blood from Arm, Right Updated: 06/27/22 0547     Protime 14 9 seconds      INR 1 23    Magnesium [[id number]]  (Normal) Collected: 06/27/22 0505    Lab Status: Final result Specimen: Blood from Arm, Right Updated: 06/27/22 0537     Magnesium 1 9 mg/dL     Phosphorus [[id number]]  (Normal) Collected: 06/27/22 0505    Lab Status: Final result Specimen: Blood from Arm, Right Updated: 06/27/22 0537     Phosphorus 3 9 mg/dL     CBC and differential [[id number]]  (Abnormal) Collected: 06/27/22 0505    Lab Status: Final result Specimen: Blood from Arm, Right Updated: 06/27/22 0521     WBC 12 29 Thousand/uL      RBC 4 64 Million/uL      Hemoglobin 14 4 g/dL      Hematocrit 43 8 %      MCV 94 fL      MCH 31 0 pg      MCHC 32 9 g/dL      RDW 13 3 %      MPV 9 8 fL      Platelets 872 Thousands/uL      nRBC 0 /100 WBCs      Neutrophils Relative 63 %      Immat GRANS % 0 %      Lymphocytes Relative 26 %      Monocytes Relative 9 %      Eosinophils Relative 2 %      Basophils Relative 0 %      Neutrophils Absolute 7 68 Thousands/µL      Immature Grans Absolute 0 05 Thousand/uL      Lymphocytes Absolute 3 14 Thousands/µL      Monocytes Absolute 1 11 Thousand/µL      Eosinophils Absolute 0 27 Thousand/µL      Basophils Absolute 0 04 Thousands/µL     Hemoglobin and hematocrit, blood [[id number]]  (Normal) Collected: 06/27/22 0017    Lab Status: Final result Specimen: Blood from Line, Venous Updated: 06/27/22 0024     Hemoglobin 15 0 g/dL      Hematocrit 45 3 %     CBC and differential [[id number]]  (Abnormal) Collected: 06/26/22 1934    Lab Status: Final result Specimen: Blood from Arm, Left Updated: 06/26/22 2015     WBC 16 54 Thousand/uL      RBC 4 72 Million/uL      Hemoglobin 14 7 g/dL      Hematocrit 44 8 %      MCV 95 fL      MCH 31 1 pg      MCHC 32 8 g/dL      RDW 12 9 %      MPV 10 1 fL      Platelets 181 Thousands/uL     Narrative: This is an appended report  These results have been appended to a previously verified report      Manual Differential(PHLEBS Do Not Order) [[id number]]  (Abnormal) Collected: 06/26/22 1934    Lab Status: Final result Specimen: Blood from Arm, Left Updated: 06/26/22 2015     Segmented % 57 %      Lymphocytes % 33 %      Monocytes % 7 %      Eosinophils, % 2 %      Basophils % 0 %      Atypical Lymphocytes % 1 %      Absolute Neutrophils 9 43 Thousand/uL      Lymphocytes Absolute 5 46 Thousand/uL      Monocytes Absolute 1 16 Thousand/uL      Eosinophils Absolute 0 33 Thousand/uL      Basophils Absolute 0 00 Thousand/uL      Total Counted --     Platelet Estimate Adequate    Comprehensive metabolic panel [[id number]]  (Abnormal) Collected: 06/26/22 1934    Lab Status: Final result Specimen: Blood from Arm, Left Updated: 06/26/22 2000     Sodium 141 mmol/L      Potassium 4 0 mmol/L      Chloride 110 mmol/L      CO2 24 mmol/L      ANION GAP 7 mmol/L      BUN 17 mg/dL      Creatinine 0 92 mg/dL      Glucose 183 mg/dL      Calcium 8 1 mg/dL      Corrected Calcium 8 9 mg/dL      AST 12 U/L      ALT 22 U/L      Alkaline Phosphatase 35 U/L      Total Protein 5 9 g/dL      Albumin 3 0 g/dL      Total Bilirubin 0 27 mg/dL      eGFR 85 ml/min/1 73sq m     Narrative:      [facility] guidelines for Chronic Kidney Disease (CKD):     Stage 1 with normal or high GFR (GFR > 90 mL/min/1 73 square meters)    Stage 2 Mild CKD (GFR = 60-89 mL/min/1 73 square meters)    Stage 3A Moderate CKD (GFR = 45-59 mL/min/1 73 square meters)    Stage 3B Moderate CKD (GFR = 30-44 mL/min/1 73 square meters)    Stage 4 Severe CKD (GFR = 15-29 mL/min/1 73 square meters)    Stage 5 End Stage CKD (GFR <15 mL/min/1 73 square meters)  Note: GFR calculation is accurate only with a steady state creatinine    Protime-INR [[id number]]  (Normal) Collected: 06/26/22 1934    Lab Status: Final result Specimen: Blood from Arm, Left Updated: 06/26/22 1954     Protime 14 5 seconds      INR 1 17    APTT [[id number]]  (Normal) Collected: 06/26/22 1934    Lab Status: Final result Specimen: Blood from Arm, Left Updated: 06/26/22 1954     PTT 23 seconds     POCT Blood Gas (CG8+) [[id number]]  (Abnormal) Collected: 06/26/22 1935    Lab Status: Final result Specimen: Venous Updated: 06/26/22 1940     ph, Mejia ISTAT 7 361     pCO2, Mejia i-STAT 46 0 mm HG      pO2, Mejia i-STAT 36 0 mm HG BE, i-STAT 0 mmol/L      HCO3, Mejia i-STAT 26 0 mmol/L      CO2, i-STAT 27 mmol/L      O2 Sat, i-STAT 67 %      SODIUM, I-STAT 140 mmol/l      Potassium, i-STAT 4 1 mmol/L      Calcium, Ionized i-STAT 1 19 mmol/L      Hct, i-STAT 44 %      Hgb, i-STAT 15 0 g/dl      Glucose, i-STAT 177 mg/dl      Specimen Type VENOUS                 CT high volume lower GI bleed   Final Result by Leoncio Isaacs MD (06/26 2111)      1  No CT evidence of active high volume gastrointestinal hemorrhage  2   Relatively decompressed appearance to the hepatic flexure, this may represent normal peristalsis, however neoplasm is in the differential   Recommend colonoscopy if not performed within the last 5 years  Workstation performed: [id number]               Procedures  Procedures      ED Course                                       MDM     44-year-old male with past medical history diabetes, hypertension, and hyperlipidemia presents for evaluation of GI bleeding, patient is pale and ill appearing, incontinent of maroon colored stools, hypotensive for EMS to 83'S systolic  Awake and alert but appears pale, will transfuse one unit prbcs uncrossmatched   initially  Differential diagnosis includes: upper GI bleed, ulcer, diverticular bleed  Will obtain istat, CBC, CMP, coags, and type and cross for 2 units  Will obtain stat GI bleed scan  Will start protonix gtt  GI bleed study shows possible contrast blush in stomach, discussed with GI, will keep patient NPO, reglan x3 doses, PPI bolus and gtt, serial Hb, plan for scope in AM    Hb stable at 14  istat shows no base excess, normal ph  Will discussed with ICU for possible SD1 admission for close monitoring overnight  Discussed plan of care with patient and his family, they expressed understanding and are agreeable with plan for admission  Patient will be admitted to MICU SD1       Disposition  Final diagnoses:   Lower GI bleed     Time reflects when diagnosis was documented in both MDM as applicable and the Disposition within this note     Time User Action Codes Description Comment    6/26/2022  8:30 PM Koroma Dose Add [K92 2] Lower GI bleed     6/26/2022  9:11 PM Lysle Quiet Add [K92 2] Acute upper GI bleed     6/26/2022  9:12 PM Lysle Quiet Modify [K92 2] Lower GI bleed     6/26/2022  9:12 PM Lysle Quiet Modify [K92 2] Acute upper GI bleed     6/28/2022 10:28 AM Harjit Miller Add [K92 1] Melena     6/28/2022  1:25 PM Sherron Serna Modify [K92 2] Lower GI bleed     6/28/2022  1:25 PM Sherron Serna Modify [K92 2] Acute upper GI bleed     6/28/2022  1:25 PM Sherron Serna Modify [K92 1] Melena       ED Disposition     ED Disposition   Admit    Condition   Stable    Date/Time   Sun Jun 26, 2022  8:30 PM    Comment   Case was discussed with critical care and the patient's admission status was agreed to be Admission Status: inpatient status to the service of Dr Rey Schwarz             Follow-up Information    None         Current Discharge Medication List      CONTINUE these medications which have NOT CHANGED    Details   ALPRAZolam (XANAX) 0 5 mg tablet TAKE 1 TABLET BY MOUTH TWICE A DAY AS NEEDED FOR ANXIETY  Qty: 60 tablet, Refills: 0    Comments: Not to exceed 5 additional fills before 02/02/2022  Associated Diagnoses: Anxiety disorder, unspecified      APPLE CIDER VINEGAR PO Take by mouth 2 daily      aspirin (ECOTRIN LOW STRENGTH) 81 mg EC tablet Take 81 mg by mouth daily      Blood Glucose Monitoring Suppl (ONE TOUCH ULTRA 2) w/Device KIT CONTOUR METER, USE 3 TIMES A DAY E11 65  Qty: 1 kit, Refills: 0    Associated Diagnoses: Uncontrolled type 2 diabetes mellitus with hyperglycemia (HCC)      CINNAMON PO Take by mouth      Dulaglutide (Trulicity) 3 DY/8 6XL SOPN Inject 0 5 mL (3 mg total) under the skin once a week  Qty: 2 mL, Refills: 3    Associated Diagnoses: Uncontrolled type 2 diabetes mellitus with hyperglycemia (HCC)      Lancets MISC Use 3 (three) times a day Contour lancets E11 65  Qty: 100 each, Refills: 1    Associated Diagnoses: Uncontrolled type 2 diabetes mellitus with hyperglycemia (HCC)      lisinopril (ZESTRIL) 5 mg tablet Take 1 tablet (5 mg total) by mouth daily  Qty: 90 tablet, Refills: 1    Associated Diagnoses: Essential hypertension, benign      metFORMIN (GLUCOPHAGE) 1000 MG tablet TAKE 1 TABLET BY MOUTH TWICE A DAY WITH MEALS  Qty: 180 tablet, Refills: 0    Associated Diagnoses: Uncontrolled type 2 diabetes mellitus with hyperglycemia (HCC)      OneTouch Ultra test strip USE 1 EACH 3 (THREE) TIMES A DAY USE AS INSTRUCTED CONTOUR TEST STRIPS E11 65  Qty: 100 strip, Refills: 1    Associated Diagnoses: Uncontrolled type 2 diabetes mellitus with hyperglycemia (HCC)      patient supplied medication Multi-Betic vitamin        simvastatin (ZOCOR) 20 mg tablet TAKE 1 TABLET BY MOUTH EVERYDAY AT BEDTIME  Qty: 90 tablet, Refills: 1    Associated Diagnoses: Hyperlipidemia, unspecified           No discharge procedures on file  PDMP Review       Value Time User    PDMP Reviewed  Yes 1/12/2022  1:44 PM Lorna Miles MD           ED Provider  Attending physically available and evaluated Dominik Reed I managed the patient along with the ED Attending      Electronically Signed by         Audrey Bledsoe MD  06/28/22 3426

## 2022-06-27 NOTE — ASSESSMENT & PLAN NOTE
Presented endorsing a one day hx of loose stools associated with bright red blood in toilet bowl and on paper  Had 1 x episode of incontinence of bright red blood / stool  Nil pain throughout  Felt dizzy and attended ED where questionable low BP en route  No alcohol / PMH  Does endorse taking 6 grms daily of cinnamon supplementation (GI irritant), and states 40-60 lbs intentional weight loss this past year   Given IVF, DDAVP and 1 pRBC unmatched on ED arrival  CT Bleed study: No evidence of active high volume gastrointestinal hemorrhage  Decompressed  hepatic flexure, this may represent normal peristalsis, however neoplasm is in the differential    Hb stable 14 4-->13 5 with [id number] trending overnight  Vitals stable without any evidence of hemodynamic compromise   GI contacted whom advised: PPI, Reglan, monitoring in mICU overnight for rebleed    In view of stability may step-down to med/surg with plan for OGD and Colonscopy 6/28    - CBC switched to [id number]  - Clear diet until midnight then NPO pending scopes  - Pt/Ot   - Hold anticoagulation and provide SCD's  - Bowel prep as per GI expertise

## 2022-06-27 NOTE — PROGRESS NOTES
Daily Progress Note - Critical Care   Dominik Reed 79 y o  male MRN: [id number]  [facility]: [id number]    ----------------------------------------------------------------------------------------  HPI/24hr events:   73-yr-old M with history of DM, HTN, HLD on 81mg daily aspirin presents with a 1 day history of acute painless rectal bleeding x 3-4 episodes  Initial episodes described as diarrhea mixed with bright blood then subsequently developed bright red blood per rectum  First two episodes of the day in the toilet until out driving in the afternoon when pt noticed bright red blood in pants  He drove home and called EMS  Whilst with EMS pt was noted to be hypotensive with systolic in 08'K  Patient noted to be incontinent upon arrival by EMS bright red blood per rectum  1 Unit of uncrossed O+ RBC blood given in ED and NSS bolus of 1 L with good response with SBP>120  Imaging revealed No CT evidence of active high volume gastrointestinal hemorrhage  Further history obtained is significant for over 40lbs intentionally weight loss this past year, does not drink alcohol and smokes 1ppd  Does take 6 grms daily of cinnamon and has done so for >6months  Of note CT commented on a decompressed hepatic flexure of which a neoplasm is included in differential       GI made aware of patient overnight in view of unremarkable imaging, stable Hb, vitals and lack of further GI bleed patient admitted to MICU for monitoring, PPI infusion, H&H trending and vitals monitoring      ---------------------------------------------------------------------------------------  SUBJECTIVE  Overnight stable without further bleeding nor pain  Has remained NPO on PPI infusion pending GI expertise  H&H stable    Review of Systems   Gastrointestinal: Positive for anal bleeding and blood in stool  Musculoskeletal: Negative  Skin: Negative for color change and pallor  Neurological: Positive for light-headedness   Negative for tremors, weakness and headaches  Hematological: Does not bruise/bleed easily  Review of systems was reviewed and negative unless stated above in HPI/24-hour events   ---------------------------------------------------------------------------------------  Assessment and Plan:    Neuro:    Diagnosis: Anxiety disorder  o Plan   Was prescribed Xanax  Not using regular  Last dose>1week ago   Diagnosis: Nil active issues Hx of   o Plan:     Q4 hr neuro checks to continue   Delirium precautions    CV:    Diagnosis: HTN  o Plan:    Hypotensive in 80's (SBP) enroute to hospital responding to fluid bolus and pRBC likely secondary to GI bleed   BP's stable since admission at 167 systolic to 980  Continuing to monitor    Holding home antihypertensives at this time (lisinopril 5mg)   Hold home aspirin which was reversed with DDAVP in the ED   Diagnosis: HLD  o Plan:    Continue home statin       Pulm:   Diagnosis: Smoking hx  o Plan:    1 PPD presently with >30 pack year hx   NRT PRN currently declined   Monitoring Spo2 with supplimental O2 to maintain sats >92%      GI:    Diagnosis: RECTAL BLEEDING   o Melena reported on admission  Pt endorses one day hx of bright red bloody loose stools, with bright blood within toilet bowl and on toilet paper, with an episode of incontinence noted whilst driving   Hb 15 0 --> 14 4  o Plan:   - 1 day hx of multiple episodes of loose stools   -  No CT evidence of active high volume gastrointestinal hemorrhage  Relatively decompressed appearance to the hepatic flexure, this may represent normal peristalsis, however neoplasm is in the differential   Recommend colonoscopy if not performed within the last 5 years   - Received DDAVP  · Continue IV Protonix gtt  · IV Reglan 10 mg Q6h x3 doses completed  · Keep NPO  · Hold home aspirin / any anticoagulation or antiplatelet agents  · Serial hemoglobin trending Q  · Transfuse as needed   Plan for EGD and colonoscopy tomorrow as per GI  Note no prior Colonscopy  :    Diagnosis: Nil Acute   o Plan:    Monitor I/O   approx 0 5 mg/kg/hr urine overnight equating to 47mls/hr      F/E/N:    Plan:   - Continues IV hydration with isolyte 75mls/hr  - Electrolytes Na 140  K+ 4 0  Mg 1 9  Phos 3 9      Heme/Onc:    Diagnosis: GI bleed  o Plan:    As per above  Received DDAVP in ED   Continues E2awyhdm H&H   INR this AM 1 23 (1 17 on admit)   Will transfuse for Hb <7 0 or acute drop of over 2g   Diagnosis: DVT prophylaxis   o Plan:    SCD's alone at this time  Endo:    Diagnosis: T2DM  o Plan:    HbA1c 5 9   Holding home metformin  Also using trulicity once weekly (last weds)   Insulin sliding scale algorithm 3 running presently    Glucose 141 this AM  Ranges 109-183      ID:    Diagnosis: Nil Acute  o Plan:    Monitor for fever curve    AM WBC 12 29 from 16 54 on admit    MSK/Skin:   · Diagnosis: No active issues  ? Plan:  § Activity as tolerated  § PT/OT  § Pressure offloading  § Monitor for signs of skin breakdown      Disposition: Continue Critical Care   Code Status: Level 1 - Full Code  ---------------------------------------------------------------------------------------  ICU CORE MEASURES    Prophylaxis   VTE Pharmacologic Prophylaxis: Pharmacologic VTE Prophylaxis contraindicated due to GI bleed  VTE Mechanical Prophylaxis: sequential compression device  Stress Ulcer Prophylaxis: Pantoprazole Infusion    ABCDE Protocol (if indicated)  Plan to perform spontaneous awakening trial today? Not applicable  Plan to perform spontaneous breathing trial today? Not applicable  Obvious barriers to extubation? Not applicable  CAM-ICU: Negative    Invasive Devices Review  Invasive Devices  Report    Peripheral Intravenous Line  Duration           Peripheral IV 06/26/22 Left Antecubital <1 day    Peripheral IV 06/27/22 Dorsal (posterior); Left Forearm <1 day    Peripheral IV 06/27/22 Dorsal (posterior); Right Forearm <1 day              Can any invasive devices be discontinued today? Yes  ---------------------------------------------------------------------------------------  OBJECTIVE    Physical Exam  Constitutional:       Appearance: Normal appearance  He is not ill-appearing  HENT:      Mouth/Throat:      Mouth: Mucous membranes are moist    Cardiovascular:      Rate and Rhythm: Normal rate  Pulses: Normal pulses  Pulmonary:      Effort: Pulmonary effort is normal  No respiratory distress  Abdominal:      Palpations: Abdomen is soft  Musculoskeletal:      Right lower leg: No edema  Left lower leg: No edema  Skin:     General: Skin is warm  Capillary Refill: Capillary refill takes less than 2 seconds  Vitals   Vitals:    22 2104 22 0000 22 0400 22 0600   BP: 157/95 118/85 122/74    BP Location: Right arm Right arm Right arm    Pulse: 85 84 68    Resp: 20 18 16    Temp: 98 4 °F (36 9 °C) 98 4 °F (36 9 °C) 98 2 °F (36 8 °C)    TempSrc: Oral Oral Oral    SpO2: 96% 96% 96%    Weight: 105 kg (231 lb 14 8 oz)   105 kg (231 lb 14 8 oz)   Height: 6' (1 829 m)        Temp (24hrs), Av 3 °F (36 8 °C), Min:98 1 °F (36 7 °C), Max:98 4 °F (36 9 °C)  Current: Temperature: 98 2 °F (36 8 °C)  HR: 68  BP: 122/74  RR: 16  SpO2: 96    Respiratory:  SpO2: SpO2: 96 %     Invasive/non-invasive ventilation settings   Respiratory  Report   Lab Data (Last 4 hours)    None         O2/Vent Data (Last 4 hours)    None              Height and Weights   Height: 6' (182 9 cm)  IBW (Ideal Body Weight): 77 6 kg  Body mass index is 31 45 kg/m²    Weight (last 2 days)     Date/Time Weight    22 0600 105 (231 92)    22 2104 105 (231 92)        Intake and Output  I/O        07 07    I V  (mL/kg)  552 (5 3)    IV Piggyback  180    Total Intake(mL/kg)  732 (7)    Urine (mL/kg/hr)  475    Total Output  475    Net  +257              Nutrition       Diet Orders   (From admission, onward)             Start     Ordered    06/26/22 2107  Diet NPO  Diet effective now        References:    Nutrtion Support Algorithm Enteral vs  Parenteral   Question Answer Comment   Diet Type NPO    RD to adjust diet per protocol?  No        06/26/22 2112              Laboratory and Diagnostics:  Results from last 7 days   Lab Units 06/27/22  0505 06/27/22  0017 06/26/22 1935 06/26/22 1934   WBC Thousand/uL 12 29*  --   --  16 54*   HEMOGLOBIN g/dL 14 4 15 0  --  14 7   I STAT HEMOGLOBIN g/dl  --   --  15 0  --    HEMATOCRIT % 43 8 45 3  --  44 8   HEMATOCRIT, ISTAT %  --   --  44  --    PLATELETS Thousands/uL 201  --   --  268   NEUTROS PCT % 63  --   --   --    MONOS PCT % 9  --   --   --    MONO PCT %  --   --   --  7     Results from last 7 days   Lab Units 06/27/22  0505 06/26/22 1935 06/26/22 1934   SODIUM mmol/L 140  --  141   POTASSIUM mmol/L 4 0  --  4 0   CHLORIDE mmol/L 110*  --  110*   CO2 mmol/L 24  --  24   CO2, I-STAT mmol/L  --  27  --    ANION GAP mmol/L 6  --  7   BUN mg/dL 18  --  17   CREATININE mg/dL 0 70  --  0 92   CALCIUM mg/dL 8 2*  --  8 1*   GLUCOSE RANDOM mg/dL 109  --  183*   ALT U/L 20  --  22   AST U/L 15  --  12   ALK PHOS U/L 32*  --  35*   ALBUMIN g/dL 2 8*  --  3 0*   TOTAL BILIRUBIN mg/dL 0 44  --  0 27     Results from last 7 days   Lab Units 06/27/22  0505   MAGNESIUM mg/dL 1 9   PHOSPHORUS mg/dL 3 9      Results from last 7 days   Lab Units 06/27/22  0505 06/26/22 1934   INR  1 23* 1 17   PTT seconds  --  23      Active Medications  Scheduled Meds:  Current Facility-Administered Medications   Medication Dose Route Frequency Provider Last Rate    insulin lispro  1-6 Units Subcutaneous Q6H [address] Rj Marquez MD      metoclopramide  10 mg Intravenous Q6H Tori Morales MD      multi-electrolyte  75 mL/hr Intravenous Continuous Rj Marquez MD 75 mL/hr (06/26/22 0322)    pantoprozole (PROTONIX) infusion (Continuous)  8 mg/hr Intravenous Continuous Oziel Daniels MD 8 mg/hr (06/26/22 2028)    pravastatin  40 mg Oral Daily With Mikel Avitia MD       Continuous Infusions:  multi-electrolyte, 75 mL/hr, Last Rate: 75 mL/hr (06/26/22 2150)  pantoprozole (PROTONIX) infusion (Continuous), 8 mg/hr, Last Rate: 8 mg/hr (06/26/22 2028)      PRN Meds:        Allergies   No Known Allergies    Kristy Severance, MD      Portions of the record may have been created with voice recognition software  Occasional wrong word or "sound a like" substitutions may have occurred due to the inherent limitations of voice recognition software    Read the chart carefully and recognize, using context, where substitutions have occurred

## 2022-06-27 NOTE — ASSESSMENT & PLAN NOTE
Lab Results   Component Value Date    HGBA1C 5 9 (H) 04/19/2022       Recent Labs     06/27/22  0013 06/27/22  0722 06/27/22  1212   POCGLU 141* 95 151*   Hold home regime of metformin 1000 mg b i d   And Trulicity once weekly (last taken Wednesday) 3 mg SC     - Continue correctional insulin sliding scale algorithm 3 whilst an inpatient

## 2022-06-27 NOTE — H&P
H&P Exam - Critical Care   Dominik Reed 79 y o  male MRN: [id number]  Unit/Bed#: [facility] 10 Encounter: [id number]      -------------------------------------------------------------------------------------------------------------  Chief Complaint: Melena/hematochezia    History of Present Illness   HX and PE limited by: N/A  Dominik Reed is a 79 y o  male who presents with multiple episodes of melena and hematochezia  PMHx of HTN, HLD, and DM  Presented to the ER via EMS for evaluation of GI bleeding  He describes several episodes of bloody diarrhea and melena throughout the day today, worsening a few hours prior to presentation to the ER  Per EMS, patient initially hypotensive with SBP in the 80's  No abdominal pain  No N/V  No history of cirrhosis or prior GI bleed, no history of alcohol abuse, no abdominal surgeries  SBP maintained 130-150 mm Hg while in ER, received 1 unit pRBC on arrival  CT A/P high volume bleed scan wet read with possible active extravasation of contrast and gastric body, formal radiology report notes no evidence of high-volume bleeding  GI consulted, plan for AM endoscopy  Admitted to MICU Step Down 1      History obtained from chart review and the patient   -------------------------------------------------------------------------------------------------------------  Assessment and Plan:    Neuro:    Diagnosis: No active issues  o Plan:   - Q4 hr neuro checks  - Delirium precautions  - Consider sleep aid/melatonin as needed    CV:    Diagnosis: Hypotension  o Plan:   - Reported hypotension by EMS pre-hospital with SBP in the 80s, likely secondary to GI bleed  - Transfused 1 unit pRBC on arrival to ER  - Stable blood pressures while in ER, -150 mm Hg  - Continue to monitor  - Transfuse as needed for hypotension and anemia   Diagnosis: Hx of HTN, HLD  o Plan:  - Home regimen- lisinopril 5 mg daily, simvastatin 20 mg at bedtime, aspirin 81 mg daily  - Holding home antihypertensives in the setting of hypotension  - Holding aspirin, reversed with DDAVP  - Continue statin      Pulm:   Diagnosis:  Hx of smoking  o Plan:   - Pack per day smoker, had previously quit for 3 years but restarted within the last year  - Nicotine patch as needed  - Monitor SpO2  - Supplemental oxygen as needed to maintain SpO2 > 92%      GI:    Diagnosis: Melena/hematochezia, likely upper GI bleed  o Plan:   - Multiple episodes of loose bowel movements with hematochezia/melena today, hypotensive per EMS with SBP 80's  - 6/26 CT A/P high-volume bleed scan - wet read with possible active extravasation of contrast and gastric body, formal radiology report notes no evidence of high-volume bleeding  - GI consulted, appreciate recommendations  - Plan for endoscopy in morning with GI, concern for PUD vs AVM, vs Dieulafoy's lesion  - Protonix gtt  - Right gland 10 mg IV Q6 hr x 3 doses  - NPO overnight  - Holding aspirin, DDAVP ordered  - Monitor Hgb with H&H Q4 hr  - Transfuse as needed  - Patient received 1 unit PRBC in ER, additional 2 units repaired in blood bank if needed      :    Diagnosis:  No active issues  o Plan:  - Monitor I/O  - Follow UOP      F/E/N:    Plan:   o Fluids:  Gentle IV maintenance fluids ordered, isolyte 75 cc/hr   o Electrolytes:  Monitor and replete as needed  o Nutrition:  NPO tonight for planned endoscopy tomorrow morning      Heme/Onc:    Diagnosis:  GI bleeding  o Plan:   - As noted above  - On aspirin 81 mg daily  - 6/26 - DDAVP ordered and administered in ER  - Q4 hr H&H  - Hgb 14 7  - Transfuse for Hgb < 7 0 or acute drop in hemoglobin > 2 g   Diagnosis:  DVT ppx  o Plan:  - Holding pharmacological prophylaxis the setting of GI bleed  - Bilateral SCDs      Endo:    Diagnosis:  Diabetes mellitus, type 2  o Plan:   - Holding home metformin  - Sliding-scale insulin ordered, algorithm 3 by body weight  - Monitor blood glucose, maintain euglycemia    ID:    Diagnosis:  No active issues  o Plan: - Monitor fever curve  - Monitor WBC      MSK/Skin:    Diagnosis: No active issues  o Plan:  - Activity as tolerated  - PT/OT  - Pressure offloading  - Monitor for signs of skin breakdown      Disposition: Admit to Stepdown Level 1  Code Status: Level 1 - Full Code  --------------------------------------------------------------------------------------------------------------  Review of Systems   Constitutional: Negative for chills and fever  Respiratory: Negative for cough and shortness of breath  Cardiovascular: Negative for chest pain, palpitations and leg swelling  Gastrointestinal: Positive for blood in stool  Negative for abdominal pain, nausea and vomiting  Melena/hematochezia   Genitourinary: Negative for dysuria and hematuria  Musculoskeletal: Negative for back pain and neck pain  Neurological: Negative for syncope, weakness, light-headedness and headaches  All other systems reviewed and are negative        A 12-point, complete review of systems was reviewed and negative except as stated above     Physical Exam     GENERAL APPEARANCE:  NAD, non-toxic appearing  NEURO:  Awake, GCS 15, no focal deficits  HEENT:  Moist mucous membranes, NC/AT  CV:  Regular rate and rhythm  LUNGS:  Clear and equal breath sounds to auscultation bilaterally  GI:  Soft, nontender, and non-distended, slightly diminished bowel sounds globally, melanotic stool per rectum  MSK:  Moving all extremities  SKIN:  Warm, dry, and intact    --------------------------------------------------------------------------------------------------------------  Vitals:   Vitals:    06/26/22 1938 06/26/22 1943 06/26/22 2104   BP: 135/83  157/95   BP Location:   Right arm   Pulse: 84  85   Resp: 16  20   Temp:  98 1 °F (36 7 °C) 98 4 °F (36 9 °C)   TempSrc:  Oral Oral   SpO2: 96%  96%   Weight:   105 kg (231 lb 14 8 oz)   Height:   6' (1 829 m)     Temp  Min: 98 1 °F (36 7 °C)  Max: 98 4 °F (36 9 °C)  IBW (Ideal Body Weight): 77 6 kg  Height: 6' (182 9 cm)  Body mass index is 31 45 kg/m²  N/A    Laboratory and Diagnostics:  Results from last 7 days   Lab Units 06/26/22 1935 06/26/22 1934   WBC Thousand/uL  --  16 54*   HEMOGLOBIN g/dL  --  14 7   I STAT HEMOGLOBIN g/dl 15 0  --    HEMATOCRIT %  --  44 8   HEMATOCRIT, ISTAT % 44  --    PLATELETS Thousands/uL  --  268   MONO PCT %  --  7     Results from last 7 days   Lab Units 06/26/22 1935 06/26/22 1934   SODIUM mmol/L  --  141   POTASSIUM mmol/L  --  4 0   CHLORIDE mmol/L  --  110*   CO2 mmol/L  --  24   CO2, I-STAT mmol/L 27  --    ANION GAP mmol/L  --  7   BUN mg/dL  --  17   CREATININE mg/dL  --  0 92   CALCIUM mg/dL  --  8 1*   GLUCOSE RANDOM mg/dL  --  183*   ALT U/L  --  22   AST U/L  --  12   ALK PHOS U/L  --  35*   ALBUMIN g/dL  --  3 0*   TOTAL BILIRUBIN mg/dL  --  0 27          Results from last 7 days   Lab Units 06/26/22 1934   INR  1 17   PTT seconds 23              ABG:    VBG:          Micro:        EKG: Reviewed  Imaging: I have personally reviewed pertinent reports     and I have personally reviewed pertinent films in PACS    Historical Information   Past Medical History:   Diagnosis Date    Acquired hypothyroidism     Anxiety     Anxiety disorder     Cigarette smoker     Colon cancer screening declined     does understand the risk of missing colon cancer - As per [employer]    Diabetes mellitus [facility] [address] [facility]     Diabetes mellitus, type 2 ([facility] [address] [facility]     Dyslipidemia     Essential hypertension, benign     Hyperlipidemia     Hypertension     Kidney stone     Type II diabetes mellitus, uncontrolled      Past Surgical History:   Procedure Laterality Date    CARDIAC CATHETERIZATION      1/19/09 no obstructive- Dr Rachna Yu      ? 2006 s/p stone removal       Social History   Social History     Substance and Sexual Activity   Alcohol Use Yes    Alcohol/week: 1 0 standard drink    Types: 1 Glasses of wine per week    Comment: rare     Social History Substance and Sexual Activity   Drug Use No     Social History     Tobacco Use   Smoking Status Former Smoker    Packs/day: 0 50    Years: 5 00    Pack years: 2 50    Quit date: 9/16/2018    Years since quitting: 3 7   Smokeless Tobacco Never Used     Exercise History: N/A  Family History:   Family History   Problem Relation Age of Onset    Heart disease Father     Heart attack Father      I have reviewed this patient's family history and commented on sigificant items within the HPI      Medications:  Current Facility-Administered Medications   Medication Dose Route Frequency    desmopressin (DDAVP) 32 mcg in sodium chloride 0 9 % 50 mL IVPB  0 3 mcg/kg Intravenous Once    [START ON 6/27/2022] insulin lispro (HumaLOG) 100 units/mL subcutaneous injection 1-6 Units  1-6 Units Subcutaneous Q6H [address]    metoclopramide (REGLAN) injection 10 mg  10 mg Intravenous Q6H    multi-electrolyte (PLASMALYTE-A/ISOLYTE-S PH 7 4) IV solution  75 mL/hr Intravenous Continuous    pantoprazole (PROTONIX) 80 mg in sodium chloride 0 9 % 100 mL infusion  8 mg/hr Intravenous Continuous    [START ON 6/27/2022] pravastatin (PRAVACHOL) tablet 40 mg  40 mg Oral Daily With Dinner     Home medications:  Prior to Admission Medications   Prescriptions Last Dose Informant Patient Reported? Taking?    ALPRAZolam (XANAX) 0 5 mg tablet  Self No No   Sig: TAKE 1 TABLET BY MOUTH TWICE A DAY AS NEEDED FOR ANXIETY   APPLE CIDER VINEGAR PO  Self Yes No   Sig: Take by mouth 2 daily   Patient not taking: Reported on 12/17/2021    Blood Glucose Monitoring Suppl (ONE TOUCH ULTRA 2) w/Device KIT  Self No No   Sig: CONTOUR METER, USE 3 TIMES A DAY E11 65   CINNAMON PO  Self Yes No   Sig: Take by mouth   Dulaglutide (Trulicity) 3 JV/1 9RR SOPN   No No   Sig: Inject 0 5 mL (3 mg total) under the skin once a week   Lancets MISC  Self No No   Sig: Use 3 (three) times a day Contour lancets E11 65   OneTouch Ultra test strip  Self No No   Sig: USE 1 EACH 3 (THREE) TIMES A DAY USE AS INSTRUCTED CONTOUR TEST STRIPS E11 65   aspirin (ECOTRIN LOW STRENGTH) 81 mg EC tablet  Self Yes No   Sig: Take 81 mg by mouth daily   Patient not taking: Reported on 12/17/2021    lisinopril (ZESTRIL) 5 mg tablet  Self No No   Sig: Take 1 tablet (5 mg total) by mouth daily   metFORMIN (GLUCOPHAGE) 1000 MG tablet  Self No No   Sig: TAKE 1 TABLET BY MOUTH TWICE A DAY WITH MEALS   patient supplied medication  Self Yes No   Sig: Multi-Betic vitamin     simvastatin (ZOCOR) 20 mg tablet  Self No No   Sig: TAKE 1 TABLET BY MOUTH EVERYDAY AT BEDTIME      Facility-Administered Medications: None     Allergies:  No Known Allergies  ------------------------------------------------------------------------------------------------------------  Advance Directive and Living Will:      Power of :    POLST:    ------------------------------------------------------------------------------------------------------------  Anticipated Length of Stay is > 2 midnights    Care Time Delivered:   Upon my evaluation, this patient had a high probability of imminent or life-threatening deterioration due to Melena, GI bleed, which required my direct attention, intervention, and personal management  I have personally provided 40 minutes of critical care time, exclusive of procedures, teaching, family meetings, and any prior time recorded by providers other than myself  Emily Betts MD        Portions of the record may have been created with voice recognition software  Occasional wrong word or "sound a like" substitutions may have occurred due to the inherent limitations of voice recognition software    Read the chart carefully and recognize, using context, where substitutions have occurred

## 2022-06-27 NOTE — ASSESSMENT & PLAN NOTE
PCP has prescribed Xanax 0 5 mg b i d  P r n  For anxiety  Patient reports he takes this readily, with no doses taken in the past 2 weeks    No symptoms of anxiety at this time as per patient    - Hold prescribing at present

## 2022-06-27 NOTE — CONSULTS
Consult Service: Gastroenterology      PATIENT INFORMATION      Dominik Reed 79 y o  male MRN: [id number]  Unit/Bed#: MICU 10 Encounter: [id number]  PCP: Virginie Dorsey MD  Date of Admission:  6/26/2022  Date of Consultation: 06/27/22  Requesting Physician: Katie Lyn,*       ASSESSMENTS & PLAN   Christelle Zhao is a 79 y o  old male with PMH of hypertension, hyperlipidemia, diabetes, tobacco use who presented with hematochezia and possible melena    GI bleed - hematochezia with possible melena  Hemodynamically stable  No previous endoscopic evaluation  CT high-volume negative for any active bleeding  Differential is broad and includes upper GI bleed, small-bowel bleed or colonic bleed  This could be due to ulcer, AVM, inflammation and/or malignancy  · Start clear liquid diet  Bowel prep today  · Plan for EGD/colonoscopy tomorrow    Abnormal CT scan - CT scan showing decompressed hepatic flexure which may represent normal peristalsis versus neoplasm  · Plan to evaluate colonoscopy as above      REASON FOR CONSULTATION      GIB      HISTORY OF PRESENT ILLNESS      Christelle Zhao is a 79 y o  old male with PMH of hypertension, hyperlipidemia, diabetes, tobacco use who presented with hematochezia and possible melena  Patient reports bloody stool starting 1 day ago  He states he had 3 loose bowel movements mixed with dark red blood throughout the day yesterday morning into the afternoon  He was then sitting as tract and noticed he was incontinent of red blood leaking out of his rectum  He also reported mild lower abdominal discomfort which has resolved  He denies any black stool nausea of vomiting  He has lost 40 lb in the past year intentionally  No dysphagia odynophagia  No previous episodes that were similar to this  No NSAID use  No blood thinners  No previous endoscopic evaluation  No significant alcohol use  Does have a significant tobacco use history    No family history of colon, esophageal or stomach malignancy  REVIEW OF SYSTEMS     A thorough 12-point review of systems has been conducted  Pertinent positives and negatives are mentioned in the history of present illness  PAST MEDICAL & SURGICAL HISTORY      Past Medical History:   Diagnosis Date    Acquired hypothyroidism     Anxiety     Anxiety disorder     Cigarette smoker     Colon cancer screening declined     does understand the risk of missing colon cancer - As per eClinicalWorks    [facility]     Diabetes mellitus, type 2 ([facility] )     Dyslipidemia     Essential hypertension, benign     Hyperlipidemia     Hypertension     Kidney stone     Type II diabetes mellitus, uncontrolled        Past Surgical History:   Procedure Laterality Date    CARDIAC CATHETERIZATION      1/19/09 no obstructive- Dr Lorrie Smith      ? 2006 s/p stone removal           MEDICATIONS & ALLERGIES       Medications:   Prior to Admission medications    Medication Sig Start Date End Date Taking?  Authorizing Provider   ALPRAZolam Durel Alt) 0 5 mg tablet TAKE 1 TABLET BY MOUTH TWICE A DAY AS NEEDED FOR ANXIETY 1/12/22   Geoff Castro MD   APPLE CIDER VINEGAR PO Take by mouth 2 daily  Patient not taking: Reported on 12/17/2021     Historical Provider, MD   aspirin (ECOTRIN LOW STRENGTH) 81 mg EC tablet Take 81 mg by mouth daily  Patient not taking: Reported on 12/17/2021     Historical Provider, MD   Blood Glucose Monitoring Suppl (ONE TOUCH ULTRA 2) w/Device KIT CONTOUR METER, USE 3 TIMES A DAY E11 65 1/12/22   Geoff Castro MD   CINNAMON PO Take by mouth    Historical Provider, MD   Dulaglutide (Trulicity) 3 BN/3 9LX SOPN Inject 0 5 mL (3 mg total) under the skin once a week 6/1/22   Geoff Castro MD   Lancets MISC Use 3 (three) times a day Contour lancets E11 65 8/11/21   Geoff Castro MD   lisinopril (ZESTRIL) 5 mg tablet Take 1 tablet (5 mg total) by mouth daily 2/14/22   José Luis Dejesus MD   metFORMIN (GLUCOPHAGE) 1000 MG tablet TAKE 1 TABLET BY MOUTH TWICE A DAY WITH MEALS 4/11/22   Geoff Castro MD   OneTouch Ultra test strip USE 1 EACH 3 (THREE) TIMES A DAY USE AS INSTRUCTED CONTOUR TEST STRIPS E11 65 10/1/21   Aurea Mosquera MD   patient supplied medication Multi-Betic vitamin      Historical Provider, MD   simvastatin (ZOCOR) 20 mg tablet TAKE 1 TABLET BY MOUTH EVERYDAY AT BEDTIME 1/12/22   Aurea Mosquera MD       Allergies: No Known Allergies      SOCIAL HISTORY      Marital Status: /Civil Union    Substance Use History:   Social History     Substance and Sexual Activity   Alcohol Use Yes    Alcohol/week: 1 0 standard drink    Types: 1 Glasses of wine per week    Comment: rare     Social History     Tobacco Use   Smoking Status Former Smoker    Packs/day: 0 50    Years: 5 00    Pack years: 2 50    Quit date: 9/16/2018    Years since quitting: 3 7   Smokeless Tobacco Never Used     Social History     Substance and Sexual Activity   Drug Use No         FAMILY HISTORY      Non-Contributory      PHYSICAL EXAM     Vitals:   Blood Pressure: 122/74 (06/27/22 0400)  Pulse: 68 (06/27/22 0400)  Temperature: 98 2 °F (36 8 °C) (06/27/22 0400)  Temp Source: Oral (06/27/22 0400)  Respirations: 16 (06/27/22 0400)  Height: 6' (182 9 cm) (06/26/22 2104)  Weight - Scale: 105 kg (231 lb 14 8 oz) (06/27/22 0600)  SpO2: 96 % (06/27/22 0400)    Physical Exam:   GENERAL: NAD  HEENT:  NC/AT, no scleral icterus  CARDIAC:  RRR, +S1/S2  PULMONARY:  CTA B/L, no wheezing/rales/rhonci, non-labored breathing  ABDOMEN:  Soft, NT/ND, +BS, no rebound/guarding/rigidity  Extremities:  No edema, cyanosis, or clubbing  NEUROLOGIC:  Alert  SKIN:  No rashes or erythema      ADDITIONAL DATA     Lab Results:     Results from last 7 days   Lab Units 06/27/22  0505   WBC Thousand/uL 12 29*   HEMOGLOBIN g/dL 14 4   HEMATOCRIT % 43 8   PLATELETS Thousands/uL 201   NEUTROS PCT % 63   LYMPHS PCT % 26   MONOS PCT % 9   EOS PCT % 2     Results from last 7 days   Lab Units 06/27/22  0505 06/26/22  1935   POTASSIUM mmol/L 4 0  --    CHLORIDE mmol/L 110*  --    CO2 mmol/L 24  --    CO2, I-STAT mmol/L  --  27   BUN mg/dL 18  --    CREATININE mg/dL 0 70  --    CALCIUM mg/dL 8 2*  --    ALK PHOS U/L 32*  --    ALT U/L 20  --    AST U/L 15  --    GLUCOSE, ISTAT mg/dl  --  177*     Results from last 7 days   Lab Units 06/27/22  0505   INR  1 23*       Imaging:    CT high volume lower GI bleed    Result Date: 6/26/2022  Narrative: CT ABDOMEN AND PELVIS - WITHOUT AND WITH IV CONTRAST INDICATION:   GI Bleed  Hypotension COMPARISON: CT 6/22/07 TECHNIQUE:  CT examination of the abdomen and pelvis was performed both prior to and after the administration of intravenous contrast  Axial, sagittal, and coronal 2D reformatted images were created from the source data and submitted for interpretation  Radiation dose length product (DLP) for this visit:  3504 32 mGy-cm   This examination, like all CT scans performed in the [facility], was performed utilizing techniques to minimize radiation dose exposure, including the use of iterative reconstruction and automated exposure control  IV Contrast:  65 mL of iohexol (OMNIPAQUE) Enteric Contrast:  Enteric contrast was not administered  FINDINGS: ABDOMEN  BOWEL:  There is no active extravasation of intravenous contrast into the lumen of stomach, small bowel, or large bowel loops  The hepatic flexure appears relatively decompressed, best appreciated on series 302/83 Fecalization of the distal small bowel, consistent with slow transit time   Minimal aortic atherosclerosis  Celiac trunk, SMA, and PAULA are patent  Inessa Reid LIVER/BILIARY TREE:  Unremarkable  GALLBLADDER:  No calcified gallstones  No pericholecystic inflammatory change  SPLEEN:  Unremarkable  PANCREAS:  Unremarkable  ADRENAL GLANDS:  Unremarkable  KIDNEYS/URETERS:  No hydronephrosis or urinary tract calculus    One or more sharply circumscribed subcentimeter renal hypodensities are present, too small to accurately characterize, and statistically most likely benign findings  According to recent literature (Radiology 2019) no further workup of these findings is recommended  APPENDIX:  No findings to suggest appendicitis  ABDOMINOPELVIC CAVITY:  No ascites  No pneumoperitoneum  No lymphadenopathy  VESSELS:  Unremarkable for patient's age  PELVIS REPRODUCTIVE ORGANS:  Unremarkable for patient's age  URINARY BLADDER:  Unremarkable  ABDOMINAL WALL/INGUINAL REGIONS:  Left inguinal hernia Small umbilical hernia OSSEOUS STRUCTURES:  No acute fracture or destructive osseous lesion  Impression: 1  No CT evidence of active high volume gastrointestinal hemorrhage  2   Relatively decompressed appearance to the hepatic flexure, this may represent normal peristalsis, however neoplasm is in the differential   Recommend colonoscopy if not performed within the last 5 years  Workstation performed: [id number]       EKG, Pathology, and Other Studies Reviewed on Admission:   · EKG: Reviewed      Counseling / Coordination of Care Time: 30 total mins spent n consult  Greater than 50% of total time spent on patient counseling and coordination of care     ** Please Note: This note is constructed using a voice recognition dictation system   **

## 2022-06-27 NOTE — QUICK NOTE
GI On-Call Note:    Messaged by ED  Patient with hematochezia/melena that began today  Reportedly hypotensive during EMS eval but VSS in ED  BP stable in 130-140s in ED  No reported abdominal pain and no history of GI bleeding  Transfused 1 unit of RBC (uncrossmatched) due to presenting complaint  No reported anticoagulation or antiplatelet use  Chart reviewed  Initial labs showing Hb of 14 7 which is relatively stable to his baseline of 15-16 5  BUN within normal limits  CT high volume bleeding scan was obtained and shows possible extravasation of contrast in the gastric body  Radiology read is pending  Etiology unclear but could be secondary to PUD vs AVM vs Dieulafoy's lesion vs mass/neoplasm vs other  Significant gastric contents in the area overlying the contrast blush which will obstruct endoscopic visualization  Given current hemodynamic stability and normal Hb, will plan for upper endoscopy tomorrow after a few doses of Reglan to promote gastric emptying  Recommendations:  · Admit to medicine  · Continue IV Protonix gtt  · IV Reglan 10 mg Q6h x3 doses  · Keep NPO  · Hold any anticoagulation or antiplatelet agents  · Serial hemoglobin  · Transfuse as needed  · Plan for EGD tomorrow or more urgently overnight if needed  · Close monitoring    Recommendations discussed with ED  GI to see patient in AM for formal consultation  Please contact the GI fellow on-call with any questions or concerns  FENRANDO Kay  Chief Gastroenterology Fellow  [facility]  Available on [email or web address]

## 2022-06-27 NOTE — UTILIZATION REVIEW
Inpatient Admission Authorization Request   NOTIFICATION OF INPATIENT ADMISSION/INPATIENT AUTHORIZATION REQUEST   SERVICING FACILITY:   [facility]  Address: [address] [place] [address], [address] [place] [address]  Tax ID: [id number]  NPI: [id number]  Place of Service: Inpatient [address] [facility]  [address]  Place of Service Code: 24     ATTENDING PROVIDER:  Attending Name and NPI#: Amber Mishra Md [[id number]]  Address: [address] [place] [address], [address] [place] [id number]  Phone: [phone]     UTILIZATION REVIEW CONTACT:  Manuel Thompson Utilization   Network Utilization Review Department  Phone: [phone]  Fax: [phone]  Email: [email or web address]     PHYSICIAN ADVISORY SERVICES:  FOR [facility] REVIEW - MEDICAL NECESSITY DENIAL  Phone: [phone]  Fax: [phone]  Email: [email or web address]     TYPE OF REQUEST:  Inpatient Status     ADMISSION INFORMATION:  ADMISSION DATE/TIME: 6/26/22  9:11 PM  PATIENT DIAGNOSIS CODE/DESCRIPTION:  Lower GI bleed [K92 2]  Acute upper GI bleed [K92 2]  Rectal bleed [K62 5]  DISCHARGE DATE/TIME: No discharge date for patient encounter  IMPORTANT INFORMATION:  Please contact Beauty Asa directly with any questions or concerns regarding this request  Department voicemails are confidential     Send requests for admission clinical reviews, concurrent reviews, approvals, and administrative denials due to lack of clinical to fax [phone]

## 2022-06-27 NOTE — OCCUPATIONAL THERAPY NOTE
Occupational Therapy Evaluation     Patient Name: Dominik Reed  Today's Date: 6/27/2022  Problem List  Principal Problem:    GI bleed  Active Problems:    Mixed hyperlipidemia    Essential hypertension, benign    Anxiety disorder    Diabetes mellitus, type 2 (HCC)    Hypotension    Melena    Aspirin long-term use    Past Medical History  Past Medical History:   Diagnosis Date    Acquired hypothyroidism     Anxiety     Anxiety disorder     Cigarette smoker     Colon cancer screening declined     does understand the risk of missing colon cancer - As per eClinicalWorks    Diabetes mellitus ([facility] [address] )     Diabetes mellitus, type 2 ([facility] [address] )     Dyslipidemia     Essential hypertension, benign     Hyperlipidemia     Hypertension     Kidney stone     Type II diabetes mellitus, uncontrolled      Past Surgical History  Past Surgical History:   Procedure Laterality Date    CARDIAC CATHETERIZATION      1/19/09 no obstructive- Dr Ambreen Goyal      ? 2006 s/p stone removal               06/27/22 1056   OT Last Visit   OT Visit Date 06/27/22   Note Type   Note type Evaluation   Restrictions/Precautions   Weight Bearing Precautions Per Order No   Other Precautions Telemetry;Multiple lines; Fall Risk;Pain   Pain Assessment   Pain Assessment Tool 0-10   Pain Score No Pain   Home Living   Type of Home House  (half double; 0 NICOLA)   Home Layout Two level;Bed/bath upstairs   Bathroom Shower/Tub Walk-in shower   Bathroom Toilet Standard   Bathroom Equipment Shower chair;Grab bars in [facility] Other (Comment)  (denies any)   Additional Comments Pt reports living in half a double home w/ 0 NICOLA, FFOS up to 2nd floor full bath; sleeps on 1st floor   Prior Function   Level of [place] Independent with ADLs and functional mobility   Lives With Spouse; Other (Comment)  (brother in law)   Vitaly Help From [facility] [address] in the last 6 months 0   Vocational Retired Comments (+)    Lifestyle   Autonomy I w/ ADLS/IADLS, transfers and functional mobility PTA   Reciprocal Relationships Pt lives w/ his spouse and brother in law; pt is caregiver for ARGELIA (does mostly IADLS tasks including med management)   Service to Others Retired    Intrinsic Gratification Enjoys hiking and camping   ADL   Eating Assistance 7  [address] 5  Supervision/Setup   [address] 5  Supervision/Setup   [address] 5  Supervision/Setup    [facility] 5  [address]  5  [address] [facility] 5  Supervision/Setup   Bed Mobility   Supine to Sit 6  Modified independent   Additional items HOB elevated   Sit to Supine 6  Modified independent   Additional items HOB elevated   Additional Comments Sat EOB w/ G balance/trunk control   Transfers   Sit to Stand 7  Independent   Stand to Sit 7  Independent   Additional Comments no AD/DME used   Functional Mobility   Functional Mobility 7  Independent   Additional Comments Pt ambulated short household distance I'ly; no AD/DME used   Balance   Static Sitting Normal   Dynamic Sitting Good   Static Standing Fair +   Dynamic Standing Fair +   Ambulatory Fair +   Activity Tolerance   Activity Tolerance Patient tolerated treatment well   Medical Staff Made Aware PT   Nurse Made Aware yes   RUE Assessment   RUE Assessment WFL   LUE Assessment   LUE Assessment WFL   Hand Function   Gross Motor Coordination Functional   Fine Motor Coordination Functional   Sensation   Light Touch No apparent deficits   Vision-Basic Assessment   Current Vision No visual deficits   Vision - Complex Assessment   Ocular Range of Motion WFL   Cognition   Overall Cognitive Status WFL   Arousal/Participation Responsive; Cooperative   Attention Within functional limits   Orientation Level Oriented X4   Memory Within functional limits   Following Commands Follows all commands and directions without difficulty   Comments Pt is pleasant and cooperative   Assessment   Limitation Decreased endurance;Decreased high-level ADLs   Prognosis Fair   Assessment Pt is a 78 y/o male seen for OT eval s/p adm to B w/ multiple episodes of melena and hematochezia  Pt is dx'd w/ GI bleeding  Pt  has a past medical history of Acquired hypothyroidism, Anxiety, Anxiety disorder, Cigarette smoker, Colon cancer screening declined, Diabetes mellitus ([facility] [address] [facility] Dyslipidemia, Essential hypertension, benign, Hyperlipidemia, Hypertension, Kidney stone, and Type II diabetes mellitus, uncontrolled  Pt with active OT orders and up with assistance  orders  Pt lives with his spouse and brother in law in half a double home w/ 0 NICOLA, FFOS up to full bath  Pt was I w/  ADLS and IADLS, drove, & required no use of DME PTA  Pt is not currently demonstrating any significant deficits in occupational performance at this time  Overall is functioning at a S level for all functional tasks w/ no use of AD/DME  Pt will have spouse at home to provide assistance if needed  Pt reports no concerns about D/C home w/ family support once medically stable  Pt presenting w/ no further immediate acute skilled OT needs  Will D/C OT  Goals   Patient Goals to go home   Recommendation   OT Discharge Recommendation No rehabilitation needs   OT - OK to Discharge Yes  (when medically stable)   Additional Comments  The patient's raw score on the AM-PAC Daily Activity inpatient short form is 24, standardized score is 57 54, greater than 39 4  Patients at this level are likely to benefit from discharge to home   Please refer to the recommendation of the Occupational Therapist for safe discharge planning   Additional Comments 2 Pt seen as a co-evaluation due to the patient's co-morbidities, clinically unstable presentation, and present impairments which are a regression from the patient's baseline   AM-PAC Daily Activity Inpatient   Lower Body Dressing 4   Bathing 4   Toileting 4   Upper Body Dressing 4   Grooming 4   Eating 4   Daily Activity Raw Score 24   Daily Activity Standardized Score (Calc for Raw Score >=11) 57 54   AM-PAC Applied Cognition Inpatient   Following a Speech/Presentation 4   Understanding Ordinary Conversation 4   Taking Medications 4   Remembering Where Things Are Placed or Put Away 4   Remembering List of 4-5 Errands 4   Taking Care of Complicated Tasks 4   Applied Cognition Raw Score 24   Applied Cognition Standardized Score 62 21       Elvis Mahan MS, OTR/L

## 2022-06-27 NOTE — ASSESSMENT & PLAN NOTE
/80 at time of transfer   Asymptomatic  Currently holding home lisinopril 5 mg daily due to GI bleed

## 2022-06-27 NOTE — CASE MANAGEMENT
Case Management Assessment & Discharge Planning Note    Patient name Blanca Hinojosa  Location MICU 10/MICU 10 MRN [id number]  : 1955 Date 2022       Current Admission Date: 2022  Current Admission Diagnosis:Acute upper GI bleed   Patient Active Problem List    Diagnosis Date Noted    Acute upper GI bleed 2022    Hypotension 2022    Melena 2022    Needs flu shot 2021    Body mass index 34 0-34 9, adult 2021    Medicare annual wellness visit, subsequent 2021    Obesity, morbid ([facility] [address] [facility] ) 2021    Dyslipidemia     Diabetes mellitus, type 2 ([facility] [address] [facility] )     Colon cancer screening declined     Dysthymic disorder 2020    Body mass index 36 0-36 9, adult 2020    Hypertension     Mixed hyperlipidemia     Essential hypertension, benign     Type II diabetes mellitus, uncontrolled     Anxiety disorder       LOS (days): 1  Geometric Mean LOS (GMLOS) (days):   Days to GMLOS:     OBJECTIVE:    Risk of Unplanned Readmission Score: 9 42         Current admission status: Inpatient       Preferred Pharmacy:   [id number] Slade  [address] [place] [address]  Phone: [phone] Fax: [phone]    Primary Care Provider: Laura Hernandez MD    Primary Insurance: [facility]  Secondary Insurance:     ASSESSMENT:  Dlishad Aviles Proxies    There are no active Health Care Proxies on file  Readmission Root Cause  30 Day Readmission: No    Patient Information  Admitted from[de-identified] Home  Mental Status: Alert  During Assessment patient was accompanied by: Not accompanied during assessment  Assessment information provided by[de-identified] Patient  Support Systems: Self, Spouse/significant other, [address] of Residence: [address] [place] [address] do you live in?: Gallo entry access options   Select all that apply : No steps to enter home  Type of Current Residence: 2 story home  Floor Level: 2  Upon entering residence, is there a bedroom on the main floor (no further steps)?: No  A bedroom is located on the following floor levels of residence (select all that apply):: 2nd Floor  Upon entering residence, is there a bathroom on the main floor (no further steps)?: No  Indicate which floors of current residence have a bathroom (select all the apply):: 2nd Floor  Number of steps to 2nd floor from main floor: One Flight  In the last 12 months, was there a time when you were not able to pay the mortgage or rent on time?: No  In the last 12 months, how many places have you lived?: 1  In the last 12 months, was there a time when you did not have a steady place to sleep or slept in a shelter (including now)?: No  Homeless/housing insecurity resource given?: N/A  Living Arrangements: Lives w/ Spouse/significant other    Activities of Daily Living Prior to Admission  Functional Status: Independent  Completes ADLs independently?: Yes  Ambulates independently?: Yes  Does patient use assisted devices?: No  Does patient currently own DME?: Yes  What DME does the patient currently own?: Wheelchair  Does patient have a history of Outpatient Therapy (PT/OT)?: Yes (following knee surgery from a work injury)  Does the patient have a history of Short-Term Rehab?: No  Does patient have a history of [facility]?: No  Does patient currently have [place] [id number]?: No         Patient Information Continued  Income Source: Pension/residential  Does patient have prescription coverage?: No  Within the past 12 months, you worried that your food would run out before you got the money to buy more : Patient refused  Within the past 12 months, the food you bought just didn't last and you didn't have money to get more : Patient refused  Food insecurity resource given?: N/A  Does patient receive dialysis treatments?: No  Does patient have a history of substance abuse?: No  Does patient have a history of Mental Health Diagnosis?: No         Means of Transportation  Means of Transport to [facility][de-identified] Drives Self  In the past 12 months, has lack of transportation kept you from medical appointments or from getting medications?: No  In the past 12 months, has lack of transportation kept you from meetings, work, or from getting things needed for daily living?: No  Was application for public transport provided?: N/A        DISCHARGE DETAILS:    Discharge planning discussed with[de-identified] Patient  Freedom of Choice: Yes  Comments - Freedom of Choice: FOC discussed  CM contacted family/caregiver?: No- see comments (declined)     Patient/caregiver received discharge checklist   Content reviewed  Patient/caregiver encouraged to participate in discharge plan of care prior to discharge home  CM reviewed d/c planning process including the following: identifying help at home, patient preference for d/c planning needs, Discharge Lounge, [facility] Meds to Bed program, availability of treatment team to discuss questions or concerns patient and/or family may have regarding understanding medications and recognizing signs and symptoms once discharged  CM also encouraged patient to follow up with all recommended appointments after discharge  Patient advised of importance for patient and family to participate in managing patients medical well being

## 2022-06-27 NOTE — UTILIZATION REVIEW
Initial Clinical Review    Admission: Date/Time/Statement:   Admission Orders (From admission, onward)     Ordered        06/26/22 2112  Inpatient Admission  Once                      Orders Placed This Encounter   Procedures    Inpatient Admission     Standing Status:   Standing     Number of Occurrences:   1     Order Specific Question:   Level of Care     Answer:   Level 1 Stepdown [13]     Order Specific Question:   Estimated length of stay     Answer:   More than 2 Midnights     Order Specific Question:   Certification     Answer:   I certify that inpatient services are medically necessary for this patient for a duration of greater than two midnights  See H&P and MD Progress Notes for additional information about the patient's course of treatment  ED Arrival Information     Expected   -    Arrival   6/26/2022 19:24    Acuity   Immediate            Means of arrival   Ambulance    Escorted by   [address]    Admission type   Emergency            Arrival complaint   Rectal Bleed           Chief Complaint   Patient presents with    Rectal Bleeding     Pt [facility] by [facility] with c/o acute onset rectal hemmorage  BP 86 for EMS  Pt CA&Ox4       Initial Presentation: 79 y o  male with PMHx of HTN, HLD, and DM presented to the ED from home via EMS with multiple episodes of melena and hematochezia  Pt reports several episodes of bloody diarrhea and melena throughout the day today  Pt was hypotensive in the 80's with EMS  In the ED, given 1u PRBC, 1L IVF, IV reglan, started on protonix gtt  BP maintained 130-150 after  No CT evidence of active high volume gastrointestinal hemorrhage  Plan: Inpatient admission for evaluation and treatment of  Melena/hematochezia, hypotension: Gi consult- endoscopy in am  Protonix gtt  Right gland 10 mg IV Q6 hr x 3 doses  NPO overnight  Holding aspirin, DDAVP ordered  Monitor Hgb with H&H Q4 hr  Transfuse as needed  CXont IVF  Date: 06/27   Day 2:   GI Consult: GI bleed  hematochezia with possible melena  Start clear liquid diet  Bowel prep today  Plan for EGD/colonoscopy tomorrow  PE: Abdomen soft, nt, nd      Critical Care Notes; Pt w/o further bleeding/pain overnight  H&H stable  CBC q12h  Cont PPI gtt  Hold ASA, Hold lisinopril         ED Triage Vitals   Temperature Pulse Respirations Blood Pressure SpO2   06/26/22 1943 06/26/22 1938 06/26/22 1938 06/26/22 1938 06/26/22 1938   98 1 °F (36 7 °C) 84 16 135/83 96 %      Temp Source Heart Rate Source Patient Position - Orthostatic VS BP Location FiO2 (%)   06/26/22 1943 06/26/22 1938 06/27/22 1031 06/26/22 2104 --   Oral Monitor Lying - Orthostatic VS Right arm       Pain Score       06/26/22 2127       No Pain          Wt Readings from Last 1 Encounters:   06/27/22 105 kg (231 lb 14 8 oz)     Additional Vital Signs:   Date/Time Temp Pulse Resp BP MAP (mmHg) SpO2 O2 Device Patient Position - Orthostatic VS   06/27/22 1044 -- -- -- -- -- -- -- Standing - Orthostatic VS   06/27/22 1043 -- 92 -- 142/96 106 93 % -- --   06/27/22 1040 -- 92 -- 138/96 111 93 % -- Sitting - Orthostatic VS   06/27/22 1031 -- 90 -- 125/80 94 95 % -- Lying - Orthostatic VS   06/27/22 0900 -- 74 -- 124/75 90 95 % -- --   06/27/22 0845 97 9 °F (36 6 °C) 62 -- -- -- 95 % -- --   06/27/22 0830 -- 70 -- -- -- 97 % -- --   06/27/22 0815 -- 74 -- -- -- 97 % -- --   06/27/22 0800 -- 82 -- 148/77 107 91 % -- --   06/27/22 0745 -- 78 -- -- -- 91 % -- --   06/27/22 0730 -- 76 -- -- -- 91 % -- --   06/27/22 0715 -- 82 -- -- -- 96 % -- --   06/27/22 0700 -- 78 -- 132/91 108 96 % -- --   06/27/22 0400 98 2 °F (36 8 °C) 68 16 122/74 90 96 % None (Room air) --   06/27/22 0000 98 4 °F (36 9 °C) 84 18 118/85 102 96 % -- --   06/26/22 2104 98 4 °F (36 9 °C) 85 20 157/95 119 96 % None (Room air) --   06/26/22 2003 -- -- -- -- -- -- None (Room air) --   06/26/22 1943 98 1 °F (36 7 °C) -- -- -- -- -- -- --       Pertinent Labs/Diagnostic Test Results:   CT high volume lower GI bleed   Final Result by Manuel Laguerre MD (06/26 2111)      1  No CT evidence of active high volume gastrointestinal hemorrhage  2   Relatively decompressed appearance to the hepatic flexure, this may represent normal peristalsis, however neoplasm is in the differential   Recommend colonoscopy if not performed within the last 5 years           Workstation performed: [id number]               Results from last 7 days   Lab Units 06/27/22  0811 06/27/22  0505 06/27/22 0017 06/26/22 1935 06/26/22 1934   WBC Thousand/uL  --  12 29*  --   --  16 54*   HEMOGLOBIN g/dL 13 5 14 4 15 0  --  14 7   I STAT HEMOGLOBIN g/dl  --   --   --  15 0  --    HEMATOCRIT % 42 0 43 8 45 3  --  44 8   HEMATOCRIT, ISTAT %  --   --   --  44  --    PLATELETS Thousands/uL  --  201  --   --  268   NEUTROS ABS Thousands/µL  --  7 68*  --   --   --          Results from last 7 days   Lab Units 06/27/22  0505 06/26/22 1935 06/26/22 1934   SODIUM mmol/L 140  --  141   POTASSIUM mmol/L 4 0  --  4 0   CHLORIDE mmol/L 110*  --  110*   CO2 mmol/L 24  --  24   CO2, I-STAT mmol/L  --  27  --    ANION GAP mmol/L 6  --  7   BUN mg/dL 18  --  17   CREATININE mg/dL 0 70  --  0 92   EGFR ml/min/1 73sq m 97  --  85   CALCIUM mg/dL 8 2*  --  8 1*   CALCIUM, IONIZED, ISTAT mmol/L  --  1 19  --    MAGNESIUM mg/dL 1 9  --   --    PHOSPHORUS mg/dL 3 9  --   --      Results from last 7 days   Lab Units 06/27/22  0505 06/26/22 1934   AST U/L 15 12   ALT U/L 20 22   ALK PHOS U/L 32* 35*   TOTAL PROTEIN g/dL 5 3* 5 9*   ALBUMIN g/dL 2 8* 3 0*   TOTAL BILIRUBIN mg/dL 0 44 0 27     Results from last 7 days   Lab Units 06/27/22  1212 06/27/22  0722 06/27/22  0013   POC GLUCOSE mg/dl 151* 95 141*     Results from last 7 days   Lab Units 06/27/22  0505 06/26/22 1934   GLUCOSE RANDOM mg/dL 109 183*           Results from last 7 days   Lab Units 06/26/22 1935   PH, LUNA I-STAT  7 361   PCO2, LUNA ISTAT mm HG 46 0 PO2, LUNA ISTAT mm HG 36 0   HCO3, LUNA ISTAT mmol/L 26 0   I STAT BASE EXC mmol/L 0   I STAT O2 SAT % 67                 Results from last 7 days   Lab Units 06/27/22  0505 06/26/22  1934   PROTIME seconds 14 9* 14 5   INR  1 23* 1 17   PTT seconds  --  23     ED Treatment:   Medication Administration from 06/26/2022 1924 to 06/26/2022 2111       Date/Time Order Dose Route Action     06/26/2022 2029 pantoprazole (PROTONIX) 80 mg in sodium chloride 0 9 % 100 mL IVPB 0 mg Intravenous Stopped     06/26/2022 2018 pantoprazole (PROTONIX) 80 mg in sodium chloride 0 9 % 100 mL IVPB 80 mg Intravenous New Bag     06/26/2022 1954 iohexol (OMNIPAQUE) 350 MG/ML injection (MULTI-DOSE) 65 mL 65 mL Intravenous Given     06/26/2022 2028 pantoprazole (PROTONIX) 80 mg in sodium chloride 0 9 % 100 mL infusion 8 mg/hr Intravenous New Bag     06/26/2022 2030 metoclopramide (REGLAN) injection 10 mg 10 mg Intravenous Given        Past Medical History:   Diagnosis Date    Acquired hypothyroidism     Anxiety     Anxiety disorder     Cigarette smoker     Colon cancer screening declined     does understand the risk of missing colon cancer - As per [employer]    Diabetes mellitus ([facility] [address] )     Diabetes mellitus, type 2 ([facility] [address] )     Dyslipidemia     Essential hypertension, benign     Hyperlipidemia     Hypertension     Kidney stone     Type II diabetes mellitus, uncontrolled      Present on Admission:   Essential hypertension, benign   Diabetes mellitus, type 2 (HCC)   Mixed hyperlipidemia   Hypotension   Melena   Anxiety disorder      Admitting Diagnosis: Lower GI bleed [K92 2]  Acute upper GI bleed [K92 2]  Rectal bleed [K62 5]  Age/Sex: 79 y o  male  Admission Orders:  SCD  Cardio-Pulmonary Monitoring, Neuro Checks, Nursing dysphagia assesment, I/O, Daily weights, Vital signs  Orthostatic hypotension  CLD, then NPO at MN    Scheduled Medications:  bisacodyl, 20 mg, Oral, Once  [START ON 6/28/2022] bisacodyl, 20 mg, Oral, Once  insulin lispro, 1-6 Units, Subcutaneous, Q6H [facility]  magnesium citrate, 296 mL, Oral, Once  polyethylene glycol, 4,000 mL, Oral, Once  pravastatin, 40 mg, Oral, Daily With Dinner      Continuous IV Infusions:  pantoprozole (PROTONIX) infusion (Continuous), 8 mg/hr, Intravenous, Continuous  multi-electrolyte (PLASMALYTE-A/ISOLYTE-S PH 7 4) IV solution  Rate: 75 mL/hr Dose: 75 mL/hr  Freq: Continuous Route: IV  Last Dose: 75 mL/hr (06/26/22 2150)  Start: 06/26/22 2115 End: 06/27/22 1102    PRN Meds:       IP CONSULT TO CASE MANAGEMENT  IP CONSULT TO GASTROENTEROLOGY    Network Utilization Review Department  ATTENTION: Please call with any questions or concerns to [phone] and carefully listen to the prompts so that you are directed to the right person  All voicemails are confidential   Victorino Stover all requests for admission clinical reviews, approved or denied determinations and any other requests to dedicated fax number below belonging to the campus where the patient is receiving treatment   List of dedicated fax numbers for the Facilities:  [address] DENIALS (Administrative/Medical Necessity) [phone]   [address] (Maternity/NICU/Pediatrics) [phone]   [address]  [address] Medical [place] [address] [id number] [place] [address] [address] [place] [address] [phone]

## 2022-06-27 NOTE — ASSESSMENT & PLAN NOTE
81mg daily  Appears to be taking this for primary prevention    DDAVP given x1 in ED 6/26     - Further dosing held whilst an inpatient due to GI bleed  - Discontinuing at time of discharge

## 2022-06-28 ENCOUNTER — APPOINTMENT (INPATIENT)
Dept: GASTROENTEROLOGY | Facility: HOSPITAL | Age: 67
DRG: 379 | End: 2022-06-28
Payer: COMMERCIAL

## 2022-06-28 ENCOUNTER — ANESTHESIA EVENT (INPATIENT)
Dept: GASTROENTEROLOGY | Facility: HOSPITAL | Age: 67
DRG: 379 | End: 2022-06-28
Payer: COMMERCIAL

## 2022-06-28 ENCOUNTER — ANESTHESIA (INPATIENT)
Dept: GASTROENTEROLOGY | Facility: HOSPITAL | Age: 67
DRG: 379 | End: 2022-06-28
Payer: COMMERCIAL

## 2022-06-28 LAB
ANION GAP SERPL CALCULATED.3IONS-SCNC: 6 MMOL/L (ref 4–13)
BUN SERPL-MCNC: 15 MG/DL (ref 5–25)
CALCIUM SERPL-MCNC: 8.1 MG/DL (ref 8.3–10.1)
CHLORIDE SERPL-SCNC: 100 MMOL/L (ref 100–108)
CO2 SERPL-SCNC: 27 MMOL/L (ref 21–32)
CREAT SERPL-MCNC: 0.8 MG/DL (ref 0.6–1.3)
GFR SERPL CREATININE-BSD FRML MDRD: 92 ML/MIN/1.73SQ M
GLUCOSE SERPL-MCNC: 106 MG/DL (ref 65–140)
GLUCOSE SERPL-MCNC: 111 MG/DL (ref 65–140)
GLUCOSE SERPL-MCNC: 82 MG/DL (ref 65–140)
GLUCOSE SERPL-MCNC: 96 MG/DL (ref 65–140)
MAGNESIUM SERPL-MCNC: 1.8 MG/DL (ref 1.6–2.6)
PHOSPHATE SERPL-MCNC: 2.6 MG/DL (ref 2.3–4.1)
POTASSIUM SERPL-SCNC: 3.8 MMOL/L (ref 3.5–5.3)
SODIUM SERPL-SCNC: 133 MMOL/L (ref 136–145)

## 2022-06-28 PROCEDURE — 0DBH8ZZ EXCISION OF CECUM, VIA NATURAL OR ARTIFICIAL OPENING ENDOSCOPIC: ICD-10-PCS | Performed by: INTERNAL MEDICINE

## 2022-06-28 PROCEDURE — 43239 EGD BIOPSY SINGLE/MULTIPLE: CPT | Performed by: INTERNAL MEDICINE

## 2022-06-28 PROCEDURE — 88305 TISSUE EXAM BY PATHOLOGIST: CPT | Performed by: PATHOLOGY

## 2022-06-28 PROCEDURE — 80048 BASIC METABOLIC PNL TOTAL CA: CPT

## 2022-06-28 PROCEDURE — C9113 INJ PANTOPRAZOLE SODIUM, VIA: HCPCS

## 2022-06-28 PROCEDURE — 82948 REAGENT STRIP/BLOOD GLUCOSE: CPT

## 2022-06-28 PROCEDURE — 0DB58ZX EXCISION OF ESOPHAGUS, VIA NATURAL OR ARTIFICIAL OPENING ENDOSCOPIC, DIAGNOSTIC: ICD-10-PCS | Performed by: INTERNAL MEDICINE

## 2022-06-28 PROCEDURE — 99232 SBSQ HOSP IP/OBS MODERATE 35: CPT | Performed by: INTERNAL MEDICINE

## 2022-06-28 PROCEDURE — 83735 ASSAY OF MAGNESIUM: CPT

## 2022-06-28 PROCEDURE — 45385 COLONOSCOPY W/LESION REMOVAL: CPT | Performed by: INTERNAL MEDICINE

## 2022-06-28 PROCEDURE — 84100 ASSAY OF PHOSPHORUS: CPT

## 2022-06-28 RX ORDER — LIDOCAINE HYDROCHLORIDE 10 MG/ML
INJECTION, SOLUTION EPIDURAL; INFILTRATION; INTRACAUDAL; PERINEURAL AS NEEDED
Status: DISCONTINUED | OUTPATIENT
Start: 2022-06-28 | End: 2022-06-28

## 2022-06-28 RX ORDER — PROPOFOL 10 MG/ML
INJECTION, EMULSION INTRAVENOUS CONTINUOUS PRN
Status: DISCONTINUED | OUTPATIENT
Start: 2022-06-28 | End: 2022-06-28

## 2022-06-28 RX ORDER — LISINOPRIL 5 MG/1
5 TABLET ORAL DAILY
Status: DISCONTINUED | OUTPATIENT
Start: 2022-06-28 | End: 2022-06-29 | Stop reason: HOSPADM

## 2022-06-28 RX ORDER — PROPOFOL 10 MG/ML
INJECTION, EMULSION INTRAVENOUS AS NEEDED
Status: DISCONTINUED | OUTPATIENT
Start: 2022-06-28 | End: 2022-06-28

## 2022-06-28 RX ORDER — NICOTINE 21 MG/24HR
21 PATCH, TRANSDERMAL 24 HOURS TRANSDERMAL DAILY
Status: DISCONTINUED | OUTPATIENT
Start: 2022-06-28 | End: 2022-06-29 | Stop reason: HOSPADM

## 2022-06-28 RX ORDER — SODIUM CHLORIDE 9 MG/ML
INJECTION, SOLUTION INTRAVENOUS CONTINUOUS PRN
Status: DISCONTINUED | OUTPATIENT
Start: 2022-06-28 | End: 2022-06-28

## 2022-06-28 RX ADMIN — PROPOFOL 100 MG: 10 INJECTION, EMULSION INTRAVENOUS at 12:43

## 2022-06-28 RX ADMIN — PANTOPRAZOLE SODIUM 40 MG: 40 INJECTION, POWDER, FOR SOLUTION INTRAVENOUS at 22:43

## 2022-06-28 RX ADMIN — PRAVASTATIN SODIUM 40 MG: 40 TABLET ORAL at 16:18

## 2022-06-28 RX ADMIN — LISINOPRIL 5 MG: 5 TABLET ORAL at 18:10

## 2022-06-28 RX ADMIN — NICOTINE 21 MG: 21 PATCH, EXTENDED RELEASE TRANSDERMAL at 16:15

## 2022-06-28 RX ADMIN — LIDOCAINE HYDROCHLORIDE 100 MG: 10 INJECTION, SOLUTION EPIDURAL; INFILTRATION; INTRACAUDAL; PERINEURAL at 12:43

## 2022-06-28 RX ADMIN — SODIUM CHLORIDE: 0.9 INJECTION, SOLUTION INTRAVENOUS at 12:39

## 2022-06-28 RX ADMIN — PROPOFOL 50 MG: 10 INJECTION, EMULSION INTRAVENOUS at 12:58

## 2022-06-28 RX ADMIN — BISACODYL 20 MG: 5 TABLET, COATED ORAL at 05:26

## 2022-06-28 RX ADMIN — PROPOFOL 150 MCG/KG/MIN: 10 INJECTION, EMULSION INTRAVENOUS at 12:44

## 2022-06-28 NOTE — PROGRESS NOTES
INTERNAL MEDICINE RESIDENCY PROGRESS NOTE     Name: Colten Le   Age & Sex: 79 y o  male   MRN: [id number]  Unit/Bed#: [facility] 918-01   Encounter: [id number]  Team: SOD Team B     PATIENT INFORMATION     Name: Colten Le   Age & Sex: 79 y o  male   MRN: [id number]  Hospital Stay Days: 2    ASSESSMENT/PLAN     Principal Problem:    GI bleed  Active Problems:    Mixed hyperlipidemia    Essential hypertension, benign    Anxiety disorder    Diabetes mellitus, type 2 (HCC)    Hypotension    Melena    Aspirin long-term use      Aspirin long-term use  Assessment & Plan  81mg daily  Appears to be taking this for primary prevention  DDAVP given x1 in ED 6/26     - Further dosing held whilst an inpatient due to GI bleed  - consider discontinuing at time of discharge    Diabetes mellitus, type 2 [facility])  Assessment & Plan  Lab Results   Component Value Date    HGBA1C 5 9 (H) 04/19/2022       Recent Labs     06/27/22  1917 06/27/22  2339 06/28/22  0528 06/28/22  1156   POCGLU 86 91 111 106       Blood Sugar Average: Last 72 hrs:  (P) 111 [id number]     Hold home regime of metformin 1000 mg b i d  And Trulicity once weekly (last taken Wednesday) 3 mg SC      - Continue correctional insulin sliding scale algorithm 3 whilst an inpatient    Anxiety disorder  Assessment & Plan  PCP has prescribed Xanax 0 5 mg b i d  P r n  For anxiety  Patient reports he takes this readily, with no doses taken in the past 2 weeks  No symptoms of anxiety at this time as per patient     - Hold prescribing at present    Essential hypertension, benign  Assessment & Plan  /80 at time of transfer  Asymptomatic  Currently holding home lisinopril 5 mg daily due to GI bleed    Mixed hyperlipidemia  Assessment & Plan  Continue with pravastatin 40 mg daily whilst an inpatient (simvastatin 20 mg HS at home)    * GI bleed  Assessment & Plan  Presented endorsing a one day hx of loose stools associated with bright red blood in toilet bowl and on paper  Had 1 x episode of incontinence of bright red blood / stool  Nil pain throughout  Felt dizzy and attended ED where questionable low BP en route  No alcohol / PMH  Does endorse taking 6 grms daily of cinnamon supplementation (GI irritant), and states 40-60 lbs intentional weight loss this past year   Given IVF, DDAVP and 1 pRBC unmatched on ED arrival  CT Bleed study: No evidence of active high volume gastrointestinal hemorrhage  Decompressed  hepatic flexure, this may represent normal peristalsis, however neoplasm is in the differential    Hb stable 14 4-->13 5 with [id number] trending overnight  Vitals stable without any evidence of hemodynamic compromise        - CBC switched to [id number]  - F/u EGD & Colonoscopy  - Continue protonix 40 mg Q12H   - Hold anticoagulation and provide SCD's      Disposition: Patient will require continued inpatient stay for observation and evaluation of GI bleed via EGD & colonoscopy  Once the etiology of his GI bleed is identified and treated, his Hgb stabilizes, and his clinical condition improves he will be discharged home  No need for rehab per PT/OT  SUBJECTIVE     Patient seen and examined  He reports one episode of seeing blood on the toilet paper after wiping  He is unsure if there was any blood in his stool after this point  He endorses abdominal discomfort due to bowel prep but denies N/V, abdominal distension, TTP, and rebound tenderness  He denies SOB, chest pain, urinary frequency, dysuria or hematuria  Patient requested a nicotine patch and one was ordered  Patient was counseled to quit smoking and he verbalized understanding       OBJECTIVE     Vitals:    06/27/22 1811 06/27/22 2252 06/28/22 0717 06/28/22 1224   BP: 168/92 161/90 158/90 137/86   BP Location:       Pulse: 71 67 79 72   Resp:  14 16 16   Temp: 98 4 °F (36 9 °C) 98 8 °F (37 1 °C) 97 6 °F (36 4 °C) 97 8 °F (36 6 °C)   TempSrc:    Tympanic   SpO2: 94% 91% 94% 94%   Weight:       Height: Temperature:   Temp (24hrs), Av 2 °F (36 8 °C), Min:97 6 °F (36 4 °C), Max:98 8 °F (37 1 °C)    Temperature: 97 8 °F (36 6 °C)  Intake & Output:  I/O        0701   0700  0701   07 07 0700    P  O    0    I V  (mL/kg) 709 3 (6 8) 849 8 (8 1)     IV Piggyback 180      Total Intake(mL/kg) 889 3 (8 5) 849 8 (8 1) 0 (0)    Urine (mL/kg/hr) 475 550 (0 2)     Total Output 475 550     Net +414 3 +299 8 0           Unmeasured Urine Occurrence  1 x     Unmeasured Stool Occurrence  4 x         Weights:   IBW (Ideal Body Weight): 77 6 kg    Body mass index is 31 45 kg/m²  Weight (last 2 days)     Date/Time Weight    22 0600 105 (231 92)    22 2104 105 (231 92)        Physical Exam  Constitutional:       General: He is not in acute distress  Appearance: Normal appearance  HENT:      Head: Normocephalic  Mouth/Throat:      Mouth: Mucous membranes are moist       Pharynx: Oropharynx is clear  Eyes:      Extraocular Movements: Extraocular movements intact  Conjunctiva/sclera: Conjunctivae normal       Pupils: Pupils are equal, round, and reactive to light  Cardiovascular:      Rate and Rhythm: Normal rate and regular rhythm  Pulses: Normal pulses  Heart sounds: Normal heart sounds  Pulmonary:      Effort: Pulmonary effort is normal       Breath sounds: Normal breath sounds  Abdominal:      General: Abdomen is flat  Bowel sounds are normal       Palpations: Abdomen is soft  Tenderness: There is no abdominal tenderness  There is no guarding or rebound  Musculoskeletal:      Right lower leg: No edema  Left lower leg: No edema  Skin:     General: Skin is warm and dry  Neurological:      General: No focal deficit present  Mental Status: He is alert and oriented to person, place, and time  Cranial Nerves: No cranial nerve deficit  Sensory: No sensory deficit  Motor: No weakness  LABORATORY DATA     Labs:  I have personally reviewed pertinent reports  Results from last 7 days   Lab Units 06/27/22 2108 06/27/22  0811 06/27/22  0505 06/26/22 1935 06/26/22 1934   WBC Thousand/uL  --   --  12 29*  --  16 54*   HEMOGLOBIN g/dL 14 0 13 5 14 4   < > 14 7   I STAT HEMOGLOBIN   --   --   --    < >  --    HEMATOCRIT % 42 2 42 0 43 8   < > 44 8   HEMATOCRIT, ISTAT   --   --   --    < >  --    PLATELETS Thousands/uL  --   --  201  --  268   NEUTROS PCT %  --   --  63  --   --    MONOS PCT %  --   --  9  --   --    MONO PCT %  --   --   --   --  7    < > = values in this interval not displayed  Results from last 7 days   Lab Units 06/28/22 0438 06/27/22 0505 06/26/22 1935 06/26/22 1934   POTASSIUM mmol/L 3 8 4 0  --  4 0   CHLORIDE mmol/L 100 110*  --  110*   CO2 mmol/L 27 24  --  24   CO2, I-STAT mmol/L  --   --  27  --    BUN mg/dL 15 18  --  17   CREATININE mg/dL 0 80 0 70  --  0 92   CALCIUM mg/dL 8 1* 8 2*  --  8 1*   ALK PHOS U/L  --  32*  --  35*   ALT U/L  --  20  --  22   AST U/L  --  15  --  12   GLUCOSE, ISTAT mg/dl  --   --  177*  --      Results from last 7 days   Lab Units 06/28/22 0438 06/27/22  0505   MAGNESIUM mg/dL 1 8 1 9     Results from last 7 days   Lab Units 06/28/22 0438 06/27/22  0505   PHOSPHORUS mg/dL 2 6 3 9      Results from last 7 days   Lab Units 06/27/22 0505 06/26/22 1934   INR  1 23* 1 17   PTT seconds  --  23               IMAGING & DIAGNOSTIC TESTING     Radiology Results: I have personally reviewed pertinent reports  CT high volume lower GI bleed    Result Date: 6/26/2022  Impression: 1  No CT evidence of active high volume gastrointestinal hemorrhage  2   Relatively decompressed appearance to the hepatic flexure, this may represent normal peristalsis, however neoplasm is in the differential   Recommend colonoscopy if not performed within the last 5 years  Workstation performed: [id number]     Other Diagnostic Testing: I have personally reviewed pertinent reports      ACTIVE MEDICATIONS     Current Facility-Administered Medications   Medication Dose Route Frequency    bisacodyl (DULCOLAX) EC tablet 20 mg  20 mg Oral Once    insulin lispro (HumaLOG) 100 units/mL subcutaneous injection 1-6 Units  1-6 Units Subcutaneous Q6H [address]    magnesium citrate (CITROMA) oral solution 296 mL  296 mL Oral Once    nicotine (NICODERM CQ) 21 mg/24 hr TD 24 hr patch 21 mg  21 mg Transdermal Daily    pantoprazole (PROTONIX) injection 40 mg  40 mg Intravenous Q12H FERCHO    pravastatin (PRAVACHOL) tablet 40 mg  40 mg Oral Daily With Dinner     Facility-Administered Medications Ordered in Other Encounters   Medication Dose Route Frequency    lidocaine (PF) (XYLOCAINE-MPF) 1 % injection   Intravenous PRN    propofol (DIPRIVAN) 1000 mg in 100 mL infusion (premix)   Intravenous Continuous PRN    propofol (DIPRIVAN) 200 MG/20ML bolus injection   Intravenous PRN    sodium chloride 0 9 % infusion   Intravenous Continuous PRN       VTE Pharmacologic Prophylaxis: RX contraindicated due to: GI bleed   VTE Mechanical Prophylaxis: sequential compression device    Portions of the record may have been created with voice recognition software  Occasional wrong word or "sound a like" substitutions may have occurred due to the inherent limitations of voice recognition software    Read the chart carefully and recognize, using context, where substitutions have occurred   ==  German 80  Medical Student

## 2022-06-28 NOTE — ANESTHESIA PREPROCEDURE EVALUATION
Procedure:  EGD  COLONOSCOPY    Relevant Problems   CARDIO   (+) Essential hypertension, benign   (+) Hypertension   (+) Mixed hyperlipidemia      ENDO   (+) Diabetes mellitus, type 2 (HCC)      GI/HEPATIC   (+) GI bleed      NEURO/PSYCH   (+) Anxiety disorder   (+) Dysthymic disorder      Problem list:     GI bleed    Mixed hyperlipidemia    Essential hypertension, benign    Anxiety disorder    Diabetes mellitus, type 2 (HCC)    Hypotension    Melena    Aspirin long-term     Hgb 14 as of 6/27/22  Patient denies nausea & vomiting  No hematemesis  Hx of sleep apnea per patient, does not use CPAP  Physical Exam    Airway    Mallampati score: II  TM Distance: >3 FB  Neck ROM: full     Dental   Comment: No lower teeth  Missing upper 4 incisors as shown on diagram ,     Cardiovascular      Pulmonary      Other Findings        Anesthesia Plan  ASA Score- 3     Anesthesia Type- IV sedation with anesthesia with ASA Monitors  Additional Monitors:   Airway Plan:           Plan Factors-    Chart reviewed  Patient is not a current smoker  Obstructive sleep apnea risk education given perioperatively  Induction- intravenous  Postoperative Plan-     Informed Consent- Anesthetic plan and risks discussed with patient  I personally reviewed this patient with the CRNA  Discussed and agreed on the Anesthesia Plan with the CRNA  Ayana Lama

## 2022-06-29 VITALS
SYSTOLIC BLOOD PRESSURE: 140 MMHG | HEART RATE: 76 BPM | DIASTOLIC BLOOD PRESSURE: 85 MMHG | OXYGEN SATURATION: 93 % | WEIGHT: 231.92 LBS | RESPIRATION RATE: 18 BRPM | TEMPERATURE: 97.7 F | HEIGHT: 72 IN | BODY MASS INDEX: 31.41 KG/M2

## 2022-06-29 LAB
ALBUMIN SERPL BCP-MCNC: 3.1 G/DL (ref 3.5–5)
ALP SERPL-CCNC: 42 U/L (ref 46–116)
ALT SERPL W P-5'-P-CCNC: 20 U/L (ref 12–78)
ANION GAP SERPL CALCULATED.3IONS-SCNC: 9 MMOL/L (ref 4–13)
AST SERPL W P-5'-P-CCNC: 15 U/L (ref 5–45)
BILIRUB SERPL-MCNC: 0.59 MG/DL (ref 0.2–1)
BUN SERPL-MCNC: 11 MG/DL (ref 5–25)
CALCIUM ALBUM COR SERPL-MCNC: 9.5 MG/DL (ref 8.3–10.1)
CALCIUM SERPL-MCNC: 8.8 MG/DL (ref 8.3–10.1)
CHLORIDE SERPL-SCNC: 106 MMOL/L (ref 100–108)
CO2 SERPL-SCNC: 26 MMOL/L (ref 21–32)
CREAT SERPL-MCNC: 0.84 MG/DL (ref 0.6–1.3)
GFR SERPL CREATININE-BSD FRML MDRD: 90 ML/MIN/1.73SQ M
GLUCOSE SERPL-MCNC: 101 MG/DL (ref 65–140)
POTASSIUM SERPL-SCNC: 3.9 MMOL/L (ref 3.5–5.3)
PROT SERPL-MCNC: 6.2 G/DL (ref 6.4–8.2)
SODIUM SERPL-SCNC: 141 MMOL/L (ref 136–145)

## 2022-06-29 PROCEDURE — 80053 COMPREHEN METABOLIC PANEL: CPT | Performed by: INTERNAL MEDICINE

## 2022-06-29 PROCEDURE — 99239 HOSP IP/OBS DSCHRG MGMT >30: CPT | Performed by: INTERNAL MEDICINE

## 2022-06-29 PROCEDURE — C9113 INJ PANTOPRAZOLE SODIUM, VIA: HCPCS

## 2022-06-29 RX ADMIN — LISINOPRIL 5 MG: 5 TABLET ORAL at 08:37

## 2022-06-29 RX ADMIN — PANTOPRAZOLE SODIUM 40 MG: 40 INJECTION, POWDER, FOR SOLUTION INTRAVENOUS at 08:37

## 2022-06-29 RX ADMIN — NICOTINE 21 MG: 21 PATCH, EXTENDED RELEASE TRANSDERMAL at 08:36

## 2022-06-29 NOTE — CASE MANAGEMENT
Case Management Discharge Planning Note    Patient name Allie Lara  Location [facility] 918/[facility] 855-02 MRN [id number]  : 1955 Date 2022       Current Admission Date: 2022  Current Admission Diagnosis:GI bleed   Patient Active Problem List    Diagnosis Date Noted    Aspirin long-term use 2022    GI bleed 2022    Hypotension 2022    Melena 2022    Needs flu shot 2021    Body mass index 34 0-34 9, adult 2021    Medicare annual wellness visit, subsequent 2021    Obesity, morbid ([facility] [address] ) 2021    Dyslipidemia     Diabetes mellitus, type 2 ([facility] [address] )     Colon cancer screening declined     Dysthymic disorder 2020    Body mass index 36 0-36 9, adult 2020    Hypertension     Mixed hyperlipidemia     Essential hypertension, benign     Type II diabetes mellitus, uncontrolled     Anxiety disorder       LOS (days): 3  Geometric Mean LOS (GMLOS) (days):   Days to GMLOS:     OBJECTIVE:  Risk of Unplanned Readmission Score: 8 48         Current admission status: Inpatient   Preferred Pharmacy:   [address] [address]  Phone: [phone] Fax: [phone]    Primary Care Provider: Lorna Miles MD    Primary Insurance: [facility]  Secondary Insurance:     DISCHARGE DETAILS:       Additional Comments: Per Ness Shirley MD, pt is medically cleared to d/c home today

## 2022-06-29 NOTE — DISCHARGE SUMMARY
INTERNAL MEDICINE RESIDENCY DISCHARGE SUMMARY     Dominik Reed   79 y o  male  MRN: [id number]  Room/Bed: [facility] 91/[facility] 918-[address] [place] [address]   Encounter: [id number]    Principal Problem:    GI bleed  Active Problems:    Mixed hyperlipidemia    Essential hypertension, benign    Anxiety disorder    Diabetes mellitus, type 2 (HCC)    Hypotension    Melena    Aspirin long-term use      Aspirin long-term use  Assessment & Plan  81mg daily  Appears to be taking this for primary prevention  DDAVP given x1 in ED 6/26     - Further dosing held whilst an inpatient due to GI bleed  - Discontinuing at time of discharge    Diabetes mellitus, type 2 [facility])  Assessment & Plan  Lab Results   Component Value Date    HGBA1C 5 9 (H) 04/19/2022       Recent Labs     06/27/22  2339 06/28/22  0528 06/28/22  1156 06/28/22  1759   POCGLU 91 111 106 82       Blood Sugar Average: Last 72 hrs:  (P) 107 875     Hold home regime of metformin 1000 mg b i d  And Trulicity once weekly (last taken Wednesday) 3 mg SC      - Continue correctional insulin sliding scale algorithm 3 whilst an inpatient    Anxiety disorder  Assessment & Plan  PCP has prescribed Xanax 0 5 mg b i d  P r n  For anxiety  Patient reports he takes this readily, with no doses taken in the past 2 weeks  No symptoms of anxiety at this time as per patient     - Hold prescribing at present    Essential hypertension, benign  Assessment & Plan  /80 at time of transfer  Asymptomatic  Currently holding home lisinopril 5 mg daily due to GI bleed    Mixed hyperlipidemia  Assessment & Plan  Continue with pravastatin 40 mg daily whilst an inpatient (simvastatin 20 mg HS at home)    * GI bleed  Assessment & Plan  Presented endorsing a one day hx of loose stools associated with bright red blood in toilet bowl and on paper  Had 1 x episode of incontinence of bright red blood / stool  Nil pain throughout     [place] dizzy and attended ED where questionable low BP en route  No alcohol / PMH  Does endorse taking 6 grms daily of cinnamon supplementation (GI irritant), and states 40-60 lbs intentional weight loss this past year   Given IVF, DDAVP and 1 pRBC unmatched on ED arrival  CT Bleed study: No evidence of active high volume gastrointestinal hemorrhage  Decompressed  hepatic flexure, this may represent normal peristalsis, however neoplasm is in the differential    Hb stable 14 4-->13 5 with [id number] trending overnight  Vitals stable without any evidence of hemodynamic compromise        - Hgb remained stable  - EGD & Colonoscopy showed polyp in esophagus, sub-centimeter cecal polyp, diverticulosis  Both polyps were Bx'd, and the esophagus was Bx'd for possible Olivera esophagus  Follow-up Bx results outpatient   - Continue protonix 40 mg Q12H   - D/C asprin    Subjective  Patient seen and examined  No acute events overnight  He feels well today  We discussed results of colonoscopy and EGD and need to follow-up with GI outpatient for biopsy results  He verbalized understanding  Also provided patient with dietary counseling, recommending a high-fiber diet and adequate hydration and sitz baths in order to reduce chance of future bleeding  Also counseled patient on smoking cessation  Physical Exam  Vitals reviewed  Constitutional:       Appearance: Normal appearance  HENT:      Head: Normocephalic and atraumatic  Mouth/Throat:      Mouth: Mucous membranes are moist       Pharynx: Oropharynx is clear  Eyes:      Extraocular Movements: Extraocular movements intact  Conjunctiva/sclera: Conjunctivae normal       Pupils: Pupils are equal, round, and reactive to light  Cardiovascular:      Rate and Rhythm: Normal rate and regular rhythm  Pulses: Normal pulses  Heart sounds: Normal heart sounds  Pulmonary:      Effort: Pulmonary effort is normal       Breath sounds: Normal breath sounds     Abdominal:      General: Abdomen is flat  Bowel sounds are normal  There is no distension  Palpations: Abdomen is soft  Tenderness: There is no abdominal tenderness  There is no guarding or rebound  Musculoskeletal:      Right lower leg: No edema  Left lower leg: No edema  Skin:     General: Skin is warm and dry  Neurological:      General: No focal deficit present  Mental Status: He is alert and oriented to person, place, and time  Mental status is at baseline  [address] COURSE     Patient called EMS 6/26 after 3 or 4 episodes of painless bright red blood mixed with diarrhea earlier that day  His most recent episode was incontinence consisting primarily of blood, after which he called EMS  EMS noted the patient to be hypotensive with his SBP in the 80s  On arrival to the ED he was given 1 unit O+ unmatched, uncrossed pRBCs and a 1 L fluid bolus  His SBP improved to be > 120, and he remained hemodynamically stable after that  He was given DDAVP x1 due to his history of taking asprin 81 mg daily  CT scan was ordered which was negative for active high-volume GI bleed  CT did show decompressed hepatic flexure with differential of neoplasm vs  Peristalsis  Patient was initially admitted to the critical care unit  His initial CBC showed Hgb of 14 7  Due to stable labs and vitals signs with no evidence of active high volume bleed on imaging, he was transferred to the medical floor  Patient treated with PPI and fluids while receiving bowel preparation for colonoscopy and EGD evaluation  Bowel prep was adequate and his colonscopy and EGD were tolerated well  There were no bleeding complications during the procedure  Biopsies were taken of a sub-centimeter cecal poyp, esophageal polyp, and to rule out Olivera esophagus  Diverticulosis and moderate internal hemorrhoids were noted on colonoscopy   Patient was counseled on the importance of smoking cessation and maintaining a high fiber diet with adequate hydration  He was also counseled to stop his daily aspirin  He was instructed to follow-up with GI outpatient for biopsy results  DISCHARGE INFORMATION     PCP at Discharge: Sheridan Levy MD    Admitting Provider: Ashley Acuna MD  Admission Date: 6/26/2022    Discharge Provider: Janessa Samuels MD  Discharge Date: 6/29/22    Discharge Disposition: Home/Self Care  Discharge Condition: good  Discharge with Lines: no    Discharge Diet: high-fiber diet  Activity Restrictions: none  Test Results Pending at Discharge: none      Discharge Diagnoses:  Principal Problem:    GI bleed  Active Problems:    Mixed hyperlipidemia    Essential hypertension, benign    Anxiety disorder    Diabetes mellitus, type 2 (HCC)    Hypotension    Melena    Aspirin long-term use  Resolved Problems:    * No resolved hospital problems  *      Consulting Providers:      Diagnostic & Therapeutic Procedures Performed:  Colonoscopy    Result Date: 6/28/2022  Impression: One subcentimeter colon polyp removed as above  Moderate to severe left-sided diverticulosis  This is likely the source of patient's hematochezia  Otherwise normal colon  Medium nternal hemorrhoids  This may have also contributed to patient's bleeding  RECOMMENDATION: Repeat colonoscopy in 7 years due to a personal history of colon polyps Return to floors Restart diet Patient counseled on fluid, fiber, ambulation Okay for discharge from gastroenterology standpoint ATTENDING ATTESTATION:  I was present throughout the entire procedure from insertion to complete withdrawal of the scope  I performed all interventions myself or oversaw the fellow  Kb Chow MD     CT high volume lower GI bleed    Result Date: 6/26/2022  Impression: 1  No CT evidence of active high volume gastrointestinal hemorrhage   2   Relatively decompressed appearance to the hepatic flexure, this may represent normal peristalsis, however neoplasm is in the differential   Recommend colonoscopy if not performed within the last 5 years   Workstation performed: [id number]       Code Status: Level 1 - Full Code  Advance Directive & Living Will: <no information>  Power of :    POLST:      Medications:  Current Discharge Medication List      STOP taking these medications       aspirin (ECOTRIN LOW STRENGTH) 81 mg EC tablet Comments:   Reason for Stopping:             Current Discharge Medication List        Current Discharge Medication List      CONTINUE these medications which have NOT CHANGED    Details   ALPRAZolam (XANAX) 0 5 mg tablet TAKE 1 TABLET BY MOUTH TWICE A DAY AS NEEDED FOR ANXIETY  Qty: 60 tablet, Refills: 0    Comments: Not to exceed 5 additional fills before 02/02/2022  Associated Diagnoses: Anxiety disorder, unspecified      APPLE CIDER VINEGAR PO Take by mouth 2 daily      Blood Glucose Monitoring Suppl (ONE TOUCH ULTRA 2) w/Device KIT CONTOUR METER, USE 3 TIMES A DAY E11 65  Qty: 1 kit, Refills: 0    Associated Diagnoses: Uncontrolled type 2 diabetes mellitus with hyperglycemia (HCC)      CINNAMON PO Take by mouth      Dulaglutide (Trulicity) 3 OY/5 2CA SOPN Inject 0 5 mL (3 mg total) under the skin once a week  Qty: 2 mL, Refills: 3    Associated Diagnoses: Uncontrolled type 2 diabetes mellitus with hyperglycemia (HCC)      Lancets MISC Use 3 (three) times a day Contour lancets E11 65  Qty: 100 each, Refills: 1    Associated Diagnoses: Uncontrolled type 2 diabetes mellitus with hyperglycemia (HCC)      lisinopril (ZESTRIL) 5 mg tablet Take 1 tablet (5 mg total) by mouth daily  Qty: 90 tablet, Refills: 1    Associated Diagnoses: Essential hypertension, benign      metFORMIN (GLUCOPHAGE) 1000 MG tablet TAKE 1 TABLET BY MOUTH TWICE A DAY WITH MEALS  Qty: 180 tablet, Refills: 0    Associated Diagnoses: Uncontrolled type 2 diabetes mellitus with hyperglycemia (HCC)      OneTouch Ultra test strip USE 1 EACH 3 (THREE) TIMES A DAY USE AS INSTRUCTED CONTOUR TEST STRIPS E11 65  Qty: 100 strip, Refills: 1    Associated Diagnoses: Uncontrolled type 2 diabetes mellitus with hyperglycemia (HCC)      patient supplied medication Multi-Betic vitamin        simvastatin (ZOCOR) 20 mg tablet TAKE 1 TABLET BY MOUTH EVERYDAY AT BEDTIME  Qty: 90 tablet, Refills: 1    Associated Diagnoses: Hyperlipidemia, unspecified             Allergies:  No Known Allergies    FOLLOW-UP     PCP Outpatient Follow-up:  follow-up with PCP    Consulting Providers Follow-up:  GI outpatient for biopsy results     Active Issues Requiring Follow-up:   none    Discharge Statement:   I spent 1 hour minutes discharging the patient  This time was spent on the day of discharge  I had direct contact with the patient on the day of discharge  Additional documentation is required if more than 30 minutes were spent on discharge  Portions of the record may have been created with voice recognition software  Occasional wrong word or "sound a like" substitutions may have occurred due to the inherent limitations of voice recognition software    Read the chart carefully and recognize, using context, where substitutions have occurred     ==  [place] 80  Medical Student, MS-4

## 2022-06-29 NOTE — UTILIZATION REVIEW
Notification of Discharge   This is a Notification of Discharge from our facility [address]  Please be advised that this patient has been discharge from our facility  Below you will find the admission and discharge date and time including the patients disposition  UTILIZATION REVIEW CONTACT:  Shadi Melendez  Utilization   Network Utilization Review Department  Phone: [phone] x carefully listen to the prompts  All voicemails are confidential   Email: [email or web address]     PHYSICIAN ADVISORY SERVICES:  FOR [id number] REVIEW - MEDICAL NECESSITY DENIAL  Phone: [phone]  Fax: [phone]  Email: [email or web address]     PRESENTATION DATE: 6/26/2022  7:24 PM  OBERVATION ADMISSION DATE:   INPATIENT ADMISSION DATE: 6/26/22  9:11 PM   DISCHARGE DATE: 6/29/2022  2:00 PM  DISPOSITION: Home/Self Care Home/Self Care      IMPORTANT INFORMATION:  Send all requests for admission clinical reviews, approved or denied determinations and any other requests to dedicated fax number below belonging to the campus where the patient is receiving treatment   List of dedicated fax numbers:  [address] DENIALS (Administrative/Medical Necessity) [phone]   [address] (Maternity/NICU/Pediatrics) [phone]   Nolberto Nguyen [phone]   Ruth Yang [phone]   [address] [place] [address]  [phone]   [address] [phone]   [facility]  [phone]   [address] [place] [address], [address]  [address] [facility] 1000 [facility] [phone]

## 2022-06-29 NOTE — PLAN OF CARE
Problem: Potential for Falls  Goal: Patient will remain free of falls  Description: INTERVENTIONS:  - Educate patient/family on patient safety including physical limitations  - Instruct patient to call for assistance with activity   - Consult OT/PT to assist with strengthening/mobility   - Keep Call bell within reach  - Keep bed low and locked with side rails adjusted as appropriate  - Keep care items and personal belongings within reach  - Initiate and maintain comfort rounds  - Make Fall Risk Sign visible to staff  - Offer Toileting every 2 Hours, in advance of need  - Initiate/Maintain bed  chair alarm  - Obtain necessary fall risk management equipment: nonskid socks  - Apply yellow socks and bracelet for high fall risk patients  - Consider moving patient to room near nurses station  Outcome: Progressing     Problem: MOBILITY - ADULT  Goal: Maintain or return to baseline ADL function  Description: INTERVENTIONS:  -  Assess patient's ability to carry out ADLs; assess patient's baseline for ADL function and identify physical deficits which impact ability to perform ADLs (bathing, care of mouth/teeth, toileting, grooming, dressing, etc )  - Assess/evaluate cause of self-care deficits   - Assess range of motion  - Assess patient's mobility; develop plan if impaired  - Assess patient's need for assistive devices and provide as appropriate  - Encourage maximum independence but intervene and supervise when necessary  - Involve family in performance of ADLs  - Assess for home care needs following discharge   - Consider OT consult to assist with ADL evaluation and planning for discharge  - Provide patient education as appropriate  Outcome: Progressing  Goal: Maintains/Returns to pre admission functional level  Description: INTERVENTIONS:  - Perform BMAT or MOVE assessment daily    - Set and communicate daily mobility goal to care team and patient/family/caregiver     - Collaborate with rehabilitation services on mobility goals if consulted  - Perform Range of Motion 3 times a day  - Reposition patient every 2 hours    - Dangle patient 3 times a day  - Stand patient 3 times a day  - Ambulate patient 3 times a day  - Out of bed to chair 3 times a day   - Out of bed for meals 3 times a day  - Out of bed for toileting  - Record patient progress and toleration of activity level   Outcome: Progressing     Problem: Prexisting or High Potential for Compromised Skin Integrity  Goal: Skin integrity is maintained or improved  Description: INTERVENTIONS:  - Identify patients at risk for skin breakdown  - Assess and monitor skin integrity  - Assess and monitor nutrition and hydration status  - Monitor labs   - Assess for incontinence   - Turn and reposition patient  - Assist with mobility/ambulation  - Relieve pressure over bony prominences  - Avoid friction and shearing  - Provide appropriate hygiene as needed including keeping skin clean and dry  - Evaluate need for skin moisturizer/barrier cream  - Collaborate with interdisciplinary team   - Patient/family teaching  - Consider wound care consult   Outcome: Progressing     Problem: CARDIOVASCULAR - ADULT  Goal: Maintains optimal cardiac output and hemodynamic stability  Description: INTERVENTIONS:  - Monitor I/O, vital signs and rhythm  - Monitor for S/S and trends of decreased cardiac output  - Administer and titrate ordered vasoactive medications to optimize hemodynamic stability  - Assess quality of pulses, skin color and temperature  - Assess for signs of decreased coronary artery perfusion  - Instruct patient to report change in severity of symptoms  Outcome: Progressing  Goal: Absence of cardiac dysrhythmias or at baseline rhythm  Description: INTERVENTIONS:  - Continuous cardiac monitoring, vital signs, obtain 12 lead EKG if ordered  - Administer antiarrhythmic and heart rate control medications as ordered  - Monitor electrolytes and administer replacement therapy as ordered  Outcome: Progressing     Problem: GASTROINTESTINAL - ADULT  Goal: Minimal or absence of nausea and/or vomiting  Description: INTERVENTIONS:  - Administer IV fluids if ordered to ensure adequate hydration  - Maintain NPO status until nausea and vomiting are resolved  - Nasogastric tube if ordered  - Administer ordered antiemetic medications as needed  - Provide nonpharmacologic comfort measures as appropriate  - Advance diet as tolerated, if ordered  - Consider nutrition services referral to assist patient with adequate nutrition and appropriate food choices  Outcome: Progressing  Goal: Maintains or returns to baseline bowel function  Description: INTERVENTIONS:  - Assess bowel function  - Encourage oral fluids to ensure adequate hydration  - Administer IV fluids if ordered to ensure adequate hydration  - Administer ordered medications as needed  - Encourage mobilization and activity  - Consider nutritional services referral to assist patient with adequate nutrition and appropriate food choices  Outcome: Progressing  Goal: Maintains adequate nutritional intake  Description: INTERVENTIONS:  - Monitor percentage of each meal consumed  - Identify factors contributing to decreased intake, treat as appropriate  - Assist with meals as needed  - Monitor I&O, weight, and lab values if indicated  - Obtain nutrition services referral as needed  Outcome: Progressing  Goal: Oral mucous membranes remain intact  Description: INTERVENTIONS  - Assess oral mucosa and hygiene practices  - Implement preventative oral hygiene regimen  - Implement oral medicated treatments as ordered  - Initiate Nutrition services referral as needed  Outcome: Progressing     Problem: METABOLIC, FLUID AND ELECTROLYTES - ADULT  Goal: Electrolytes maintained within normal limits  Description: INTERVENTIONS:  - Monitor labs and assess patient for signs and symptoms of electrolyte imbalances  - Administer electrolyte replacement as ordered  - Monitor response to electrolyte replacements, including repeat lab results as appropriate  - Instruct patient on fluid and nutrition as appropriate  Outcome: Progressing  Goal: Fluid balance maintained  Description: INTERVENTIONS:  - Monitor labs   - Monitor I/O and WT  - Instruct patient on fluid and nutrition as appropriate  - Assess for signs & symptoms of volume excess or deficit  Outcome: Progressing  Goal: Glucose maintained within target range  Description: INTERVENTIONS:  - Monitor Blood Glucose as ordered  - Assess for signs and symptoms of hyperglycemia and hypoglycemia  - Administer ordered medications to maintain glucose within target range  - Assess nutritional intake and initiate nutrition service referral as needed  Outcome: Progressing     Problem: MUSCULOSKELETAL - ADULT  Goal: Maintain or return mobility to safest level of function  Description: INTERVENTIONS:  - Assess patient's ability to carry out ADLs; assess patient's baseline for ADL function and identify physical deficits which impact ability to perform ADLs (bathing, care of mouth/teeth, toileting, grooming, dressing, etc )  - Assess/evaluate cause of self-care deficits   - Assess range of motion  - Assess patient's mobility  - Assess patient's need for assistive devices and provide as appropriate  - Encourage maximum independence but intervene and supervise when necessary  - Involve family in performance of ADLs  - Assess for home care needs following discharge   - Consider OT consult to assist with ADL evaluation and planning for discharge  - Provide patient education as appropriate  Outcome: Progressing

## 2022-06-30 ENCOUNTER — TRANSITIONAL CARE MANAGEMENT (OUTPATIENT)
Dept: INTERNAL MEDICINE CLINIC | Facility: CLINIC | Age: 67
End: 2022-06-30

## 2022-06-30 ENCOUNTER — TELEPHONE (OUTPATIENT)
Dept: INTERNAL MEDICINE CLINIC | Facility: CLINIC | Age: 67
End: 2022-06-30

## 2022-06-30 NOTE — TELEPHONE ENCOUNTER
patient called for appointment sent him up for a TCM on July 6th at 10:45 with DR Castro  Patient discharged on 06/29/2022 from [facility] [address] [address] [facility] with diverticulitis and internal hemorrhage

## 2022-07-05 ENCOUNTER — VBI (OUTPATIENT)
Dept: ADMINISTRATIVE | Facility: OTHER | Age: 67
End: 2022-07-05

## 2022-07-06 ENCOUNTER — OFFICE VISIT (OUTPATIENT)
Dept: INTERNAL MEDICINE CLINIC | Facility: CLINIC | Age: 67
End: 2022-07-06
Payer: COMMERCIAL

## 2022-07-06 VITALS
OXYGEN SATURATION: 97 % | HEIGHT: 72 IN | DIASTOLIC BLOOD PRESSURE: 72 MMHG | SYSTOLIC BLOOD PRESSURE: 124 MMHG | BODY MASS INDEX: 30.61 KG/M2 | WEIGHT: 226 LBS | HEART RATE: 90 BPM | TEMPERATURE: 98.8 F

## 2022-07-06 DIAGNOSIS — E11.65 UNCONTROLLED TYPE 2 DIABETES MELLITUS WITH HYPERGLYCEMIA (HCC): ICD-10-CM

## 2022-07-06 DIAGNOSIS — I10 ESSENTIAL HYPERTENSION, BENIGN: ICD-10-CM

## 2022-07-06 DIAGNOSIS — F34.1 DYSTHYMIC DISORDER: ICD-10-CM

## 2022-07-06 DIAGNOSIS — E78.2 MIXED HYPERLIPIDEMIA: ICD-10-CM

## 2022-07-06 DIAGNOSIS — K57.91 GASTROINTESTINAL HEMORRHAGE ASSOCIATED WITH INTESTINAL DIVERTICULOSIS: Primary | ICD-10-CM

## 2022-07-06 PROCEDURE — 99496 TRANSJ CARE MGMT HIGH F2F 7D: CPT | Performed by: INTERNAL MEDICINE

## 2022-07-06 PROCEDURE — 1111F DSCHRG MED/CURRENT MED MERGE: CPT | Performed by: INTERNAL MEDICINE

## 2022-07-06 NOTE — PROGRESS NOTES
Transitional Care Management Review:  Waddell Snellen is a 79 y o  male here for TCM follow up  During the TCM phone call patient stated:    TCM Call (since 6/5/2022)     Date and time call was made  6/30/2022 12:33 PM    Hospital care reviewed  Records reviewed    Patient was hospitialized at  [facility]    Date of Admission  06/26/22    Date of discharge  06/29/22    Diagnosis  GI bleed    Disposition  Home    Current Symptoms  None      TCM Call (since 6/5/2022)     Post hospital issues  None    Scheduled for follow up? Yes    Did you obtain your prescribed medications  Yes    Do you need help managing your prescriptions or medications  No    Is transportation to your appointment needed  No    I have advised the patient to call PCP with any new or worsening symptoms  Amanda Segovia MD      Assessment/Plan:             1  Gastrointestinal hemorrhage associated with intestinal diverticulosis    2  Uncontrolled type 2 diabetes mellitus with hyperglycemia ([address] )    3  Essential hypertension, benign    4  Mixed hyperlipidemia    5  Dysthymic disorder         Subjective:      Patient ID: Waddell Snellen is a 79 y o  male  Follow-up from hospitalization, doing well      The following portions of the patient's history were reviewed and updated as appropriate: He  has a past medical history of Acquired hypothyroidism, Anxiety, Anxiety disorder, Cigarette smoker, Colon cancer screening declined, Diabetes mellitus ([address] ), Diabetes mellitus, type 2 ([address] ), Dyslipidemia, Essential hypertension, benign, Hyperlipidemia, Hypertension, Kidney stone, and Type II diabetes mellitus, uncontrolled    He   Patient Active Problem List    Diagnosis Date Noted    Aspirin long-term use 06/27/2022    GI bleed 06/26/2022    Hypotension 06/26/2022    Melena 06/26/2022    Needs flu shot 09/17/2021    Body mass index 34 0-34 9, adult 01/12/2021    Medicare annual wellness visit, subsequent 01/12/2021  Obesity, morbid ([facility] ) 01/12/2021    Dyslipidemia     Diabetes mellitus, type 2 ([facility] )     Colon cancer screening declined     Dysthymic disorder 09/16/2020    Body mass index 36 0-36 9, adult 09/16/2020    Hypertension     Mixed hyperlipidemia     Essential hypertension, benign     Type II diabetes mellitus, uncontrolled     Anxiety disorder      He  has a past surgical history that includes Kidney stone surgery and Cardiac catheterization  His family history includes Heart attack in his father; Heart disease in his father  He  reports that he quit smoking about 3 years ago  He has a 2 50 pack-year smoking history  He has never used smokeless tobacco  He reports current alcohol use of about 1 0 standard drink of alcohol per week  He reports that he does not use drugs  Current Outpatient Medications   Medication Sig Dispense Refill    ALPRAZolam (XANAX) 0 5 mg tablet TAKE 1 TABLET BY MOUTH TWICE A DAY AS NEEDED FOR ANXIETY 60 tablet 0    Blood Glucose Monitoring Suppl (ONE TOUCH ULTRA 2) w/Device KIT CONTOUR METER, USE 3 TIMES A DAY E11 65 1 kit 0    Dulaglutide (Trulicity) 3 GY/5 1OH SOPN Inject 0 5 mL (3 mg total) under the skin once a week 2 mL 3    Lancets MISC Use 3 (three) times a day Contour lancets E11 65 100 each 1    lisinopril (ZESTRIL) 5 mg tablet Take 1 tablet (5 mg total) by mouth daily 90 tablet 1    metFORMIN (GLUCOPHAGE) 1000 MG tablet TAKE 1 TABLET BY MOUTH TWICE A DAY WITH MEALS (Patient taking differently: 1/2 tab in the AM and 1/2 in the PM) 180 tablet 0    OneTouch Ultra test strip USE 1 EACH 3 (THREE) TIMES A DAY USE AS INSTRUCTED CONTOUR TEST STRIPS E11 65 100 strip 1    patient supplied medication Multi-Betic vitamin        simvastatin (ZOCOR) 20 mg tablet TAKE 1 TABLET BY MOUTH EVERYDAY AT BEDTIME 90 tablet 1     No current facility-administered medications for this visit  He has No Known Allergies       Review of Systems   Constitutional: Negative for chills and fever    HENT: Negative for congestion, ear pain and sore throat  Eyes: Negative for pain  Respiratory: Negative for cough and shortness of breath  Cardiovascular: Negative for chest pain and leg swelling  Gastrointestinal: Negative for abdominal pain, nausea and vomiting  Endocrine: Negative for polyuria  Genitourinary: Negative for difficulty urinating, frequency and urgency  Musculoskeletal: Negative for arthralgias and back pain  Skin: Negative for rash  Neurological: Negative for weakness and headaches  Psychiatric/Behavioral: Negative for sleep disturbance  The patient is not nervous/anxious            Objective:      /72 (BP Location: Right arm, Patient Position: Sitting, Cuff Size: Standard)   Pulse 90   Temp 98 8 °F (37 1 °C) (Temporal)   Ht 6' (1 829 m)   Wt 103 kg (226 lb)   SpO2 97%   BMI 30 65 kg/m²     Recent Results (from the past 336 hour(s))   Prepare Leukoreduced RBC    Collection Time: 06/26/22  7:30 PM   Result Value Ref Range    Unit Product Code F0019I77     Unit Number [id number]     Unit ABO O     Unit [facility] POS     Unit Dispense Status Emergency Iss     Unit Product Volume 350 mL   CBC and differential    Collection Time: 06/26/22  7:34 PM   Result Value Ref Range    WBC 16 54 (H) 4 31 - 10 16 Thousand/uL    RBC 4 72 3 88 - 5 62 Million/uL    Hemoglobin 14 7 12 0 - 17 0 g/dL    Hematocrit 44 8 36 5 - 49 3 %    MCV 95 82 - 98 fL    MCH 31 1 26 8 - 34 3 pg    MCHC 32 8 31 4 - 37 4 g/dL    RDW 12 9 11 6 - 15 1 %    MPV 10 1 8 9 - 12 7 fL    Platelets 705 489 - 938 Thousands/uL   Comprehensive metabolic panel    Collection Time: 06/26/22  7:34 PM   Result Value Ref Range    Sodium 141 136 - 145 mmol/L    Potassium 4 0 3 5 - 5 3 mmol/L    Chloride 110 (H) 100 - 108 mmol/L    CO2 24 21 - 32 mmol/L    ANION GAP 7 4 - 13 mmol/L    BUN 17 5 - 25 mg/dL    Creatinine 0 92 0 60 - 1 30 mg/dL    Glucose 183 (H) 65 - 140 mg/dL    Calcium 8 1 (L) 8 3 - 10 1 mg/dL Corrected Calcium 8 9 8 3 - 10 1 mg/dL    AST 12 5 - 45 U/L    ALT 22 12 - 78 U/L    Alkaline Phosphatase 35 (L) 46 - 116 U/L    Total Protein 5 9 (L) 6 4 - 8 2 g/dL    Albumin 3 0 (L) 3 5 - 5 0 g/dL    Total Bilirubin 0 27 0 20 - 1 00 mg/dL    eGFR 85 ml/min/1 73sq m   Protime-INR    Collection Time: 06/26/22  7:34 PM   Result Value Ref Range    Protime 14 5 11 6 - 14 5 seconds    INR 1 17 0 84 - 1 19   APTT    Collection Time: 06/26/22  7:34 PM   Result Value Ref Range    PTT 23 23 - 37 seconds   Type and screen    Collection Time: 06/26/22  7:34 PM   Result Value Ref Range    ABO Grouping A     Rh Factor Positive     Antibody Screen Negative     Specimen Expiration Date [id number]    Manual Differential(PHLEBS Do Not Order)    Collection Time: 06/26/22  7:34 PM   Result Value Ref Range    Segmented % 57 43 - 75 %    Lymphocytes % 33 14 - 44 %    Monocytes % 7 4 - 12 %    Eosinophils, % 2 0 - 6 %    Basophils % 0 0 - 1 %    Atypical Lymphocytes % 1 (H) <=0 %    Absolute Neutrophils 9 43 (H) 1 85 - 7 62 Thousand/uL    Lymphocytes Absolute 5 46 (H) 0 60 - 4 47 Thousand/uL    Monocytes Absolute 1 16 0 00 - 1 22 Thousand/uL    Eosinophils Absolute 0 33 0 00 - 0 40 Thousand/uL    Basophils Absolute 0 00 0 00 - 0 10 Thousand/uL    Total Counted      Platelet Estimate Adequate Adequate   POCT Blood Gas (CG8+)    Collection Time: 06/26/22  7:35 PM   Result Value Ref Range    ph, Mejia ISTAT 7 361 7 300 - 7 400    pCO2, Mejia i-STAT 46 0 42 0 - 50 0 mm HG    pO2, Mejia i-STAT 36 0 35 0 - 45 0 mm HG    BE, i-STAT 0 -2 - 3 mmol/L    HCO3, Mejia i-STAT 26 0 24 0 - 30 0 mmol/L    CO2, i-STAT 27 21 - 32 mmol/L    O2 Sat, i-STAT 67 60 - 85 %    SODIUM, I-STAT 140 136 - 145 mmol/l    Potassium, i-STAT 4 1 3 5 - 5 3 mmol/L    Calcium, Ionized i-STAT 1 19 1 12 - 1 32 mmol/L    Hct, i-STAT 44 36 5 - 49 3 %    Hgb, i-STAT 15 0 12 0 - 17 0 g/dl    Glucose, i-STAT 177 (H) 65 - 140 mg/dl    Specimen Type VENOUS    Prepare Leukoreduced RBC Collection Time: 06/26/22  8:27 PM   Result Value Ref Range    Unit Product Code O7285E65     Unit Number [id number]     Unit ABO O     Unit [facility] POS     Crossmatch Compatible     Unit Dispense Status Post Issue XM     Unit Product Volume 350 mL   Fingerstick Glucose (POCT)    Collection Time: 06/27/22 12:13 AM   Result Value Ref Range    POC Glucose 141 (H) 65 - 140 mg/dl   Hemoglobin and hematocrit, blood    Collection Time: 06/27/22 12:17 AM   Result Value Ref Range    Hemoglobin 15 0 12 0 - 17 0 g/dL    Hematocrit 45 3 36 5 - 49 3 %   CBC and differential    Collection Time: 06/27/22  5:05 AM   Result Value Ref Range    WBC 12 29 (H) 4 31 - 10 16 Thousand/uL    RBC 4 64 3 88 - 5 62 Million/uL    Hemoglobin 14 4 12 0 - 17 0 g/dL    Hematocrit 43 8 36 5 - 49 3 %    MCV 94 82 - 98 fL    MCH 31 0 26 8 - 34 3 pg    MCHC 32 9 31 4 - 37 4 g/dL    RDW 13 3 11 6 - 15 1 %    MPV 9 8 8 9 - 12 7 fL    Platelets 370 053 - 161 Thousands/uL    nRBC 0 /100 WBCs    Neutrophils Relative 63 43 - 75 %    Immat GRANS % 0 0 - 2 %    Lymphocytes Relative 26 14 - 44 %    Monocytes Relative 9 4 - 12 %    Eosinophils Relative 2 0 - 6 %    Basophils Relative 0 0 - 1 %    Neutrophils Absolute 7 68 (H) 1 85 - 7 62 Thousands/µL    Immature Grans Absolute 0 05 0 00 - 0 20 Thousand/uL    Lymphocytes Absolute 3 14 0 60 - 4 47 Thousands/µL    Monocytes Absolute 1 11 0 17 - 1 22 Thousand/µL    Eosinophils Absolute 0 27 0 00 - 0 61 Thousand/µL    Basophils Absolute 0 04 0 00 - 0 10 Thousands/µL   Comprehensive metabolic panel    Collection Time: 06/27/22  5:05 AM   Result Value Ref Range    Sodium 140 136 - 145 mmol/L    Potassium 4 0 3 5 - 5 3 mmol/L    Chloride 110 (H) 100 - 108 mmol/L    CO2 24 21 - 32 mmol/L    ANION GAP 6 4 - 13 mmol/L    BUN 18 5 - 25 mg/dL    Creatinine 0 70 0 60 - 1 30 mg/dL    Glucose 109 65 - 140 mg/dL    Calcium 8 2 (L) 8 3 - 10 1 mg/dL    Corrected Calcium 9 2 8 3 - 10 1 mg/dL    AST 15 5 - 45 U/L    ALT 20 12 - 78 U/L    Alkaline Phosphatase 32 (L) 46 - 116 U/L    Total Protein 5 3 (L) 6 4 - 8 2 g/dL    Albumin 2 8 (L) 3 5 - 5 0 g/dL    Total Bilirubin 0 44 0 20 - 1 00 mg/dL    eGFR 97 ml/min/1 73sq m   Magnesium    Collection Time: 06/27/22  5:05 AM   Result Value Ref Range    Magnesium 1 9 1 6 - 2 6 mg/dL   Phosphorus    Collection Time: 06/27/22  5:05 AM   Result Value Ref Range    Phosphorus 3 9 2 3 - 4 1 mg/dL   Protime-INR    Collection Time: 06/27/22  5:05 AM   Result Value Ref Range    Protime 14 9 (H) 11 6 - 14 5 seconds    INR 1 23 (H) 0 84 - 1 19   Prepare Leukoreduced RBC    Collection Time: 06/27/22  5:55 AM   Result Value Ref Range    Unit Product Code Z7345T99     Unit Number [id number]     Unit ABO O     Unit [facility] POS     Unit Dispense Status Presumed Trans     Unit Product Volume 350 mL   Fingerstick Glucose (POCT)    Collection Time: 06/27/22  7:22 AM   Result Value Ref Range    POC Glucose 95 65 - 140 mg/dl   Hemoglobin and hematocrit, blood    Collection Time: 06/27/22  8:11 AM   Result Value Ref Range    Hemoglobin 13 5 12 0 - 17 0 g/dL    Hematocrit 42 0 36 5 - 49 3 %   Prepare Leukoreduced RBC: 2 Units    Collection Time: 06/27/22  8:25 AM   Result Value Ref Range    Unit Product Code F5237T18     Unit Number [id number]     Unit ABO A     Unit [facility] POS     Crossmatch Compatible     Unit Dispense Status Return to [facility]     Unit Product Volume 350 mL    Unit Product Code Z5304I45     Unit Number [id number]     Unit ABO A     Unit [facility] POS     Crossmatch Compatible     Unit Dispense Status Return to [facility]     Unit Product Volume 350 mL   Fingerstick Glucose (POCT)    Collection Time: 06/27/22 12:12 PM   Result Value Ref Range    POC Glucose 151 (H) 65 - 140 mg/dl   Fingerstick Glucose (POCT)    Collection Time: 06/27/22  7:17 PM   Result Value Ref Range    POC Glucose 86 65 - 140 mg/dl   Hemoglobin and hematocrit, blood    Collection Time: 06/27/22  9:08 PM   Result Value Ref Range    Hemoglobin 14 0 12 0 - 17 0 g/dL    Hematocrit 42 2 36 5 - 49 3 %   Fingerstick Glucose (POCT)    Collection Time: 06/27/22 11:39 PM   Result Value Ref Range    POC Glucose 91 65 - 140 mg/dl   Basic metabolic panel    Collection Time: 06/28/22  4:38 AM   Result Value Ref Range    Sodium 133 (L) 136 - 145 mmol/L    Potassium 3 8 3 5 - 5 3 mmol/L    Chloride 100 100 - 108 mmol/L    CO2 27 21 - 32 mmol/L    ANION GAP 6 4 - 13 mmol/L    BUN 15 5 - 25 mg/dL    Creatinine 0 80 0 60 - 1 30 mg/dL    Glucose 96 65 - 140 mg/dL    Calcium 8 1 (L) 8 3 - 10 1 mg/dL    eGFR 92 ml/min/1 73sq m   Magnesium    Collection Time: 06/28/22  4:38 AM   Result Value Ref Range    Magnesium 1 8 1 6 - 2 6 mg/dL   Phosphorus    Collection Time: 06/28/22  4:38 AM   Result Value Ref Range    Phosphorus 2 6 2 3 - 4 1 mg/dL   Fingerstick Glucose (POCT)    Collection Time: 06/28/22  5:28 AM   Result Value Ref Range    POC Glucose 111 65 - 140 mg/dl   Fingerstick Glucose (POCT)    Collection Time: 06/28/22 11:56 AM   Result Value Ref Range    POC Glucose 106 65 - 140 mg/dl   Fingerstick Glucose (POCT)    Collection Time: 06/28/22  5:59 PM   Result Value Ref Range    POC Glucose 82 65 - 140 mg/dl   Comprehensive metabolic panel    Collection Time: 06/29/22  4:47 AM   Result Value Ref Range    Sodium 141 136 - 145 mmol/L    Potassium 3 9 3 5 - 5 3 mmol/L    Chloride 106 100 - 108 mmol/L    CO2 26 21 - 32 mmol/L    ANION GAP 9 4 - 13 mmol/L    BUN 11 5 - 25 mg/dL    Creatinine 0 84 0 60 - 1 30 mg/dL    Glucose 101 65 - 140 mg/dL    Calcium 8 8 8 3 - 10 1 mg/dL    Corrected Calcium 9 5 8 3 - 10 1 mg/dL    AST 15 5 - 45 U/L    ALT 20 12 - 78 U/L    Alkaline Phosphatase 42 (L) 46 - 116 U/L    Total Protein 6 2 (L) 6 4 - 8 2 g/dL    Albumin 3 1 (L) 3 5 - 5 0 g/dL    Total Bilirubin 0 59 0 20 - 1 00 mg/dL    eGFR 90 ml/min/1 73sq m        Physical Exam  Constitutional:       Appearance: Normal appearance  HENT:      Head: Normocephalic        Right Ear: Tympanic membrane, ear canal and external ear normal       Left Ear: Tympanic membrane, ear canal and external ear normal       Nose: Nose normal  No congestion  Mouth/Throat:      Mouth: Mucous membranes are moist       Pharynx: Oropharynx is clear  No oropharyngeal exudate or posterior oropharyngeal erythema  Eyes:      Extraocular Movements: Extraocular movements intact  Conjunctiva/sclera: Conjunctivae normal       Pupils: Pupils are equal, round, and reactive to light  Cardiovascular:      Rate and Rhythm: Normal rate and regular rhythm  Heart sounds: Normal heart sounds  No murmur heard  Pulmonary:      Effort: Pulmonary effort is normal       Breath sounds: Normal breath sounds  No wheezing or rales  Abdominal:      General: Bowel sounds are normal  There is no distension  Palpations: Abdomen is soft  Tenderness: There is no abdominal tenderness  Musculoskeletal:         General: Normal range of motion  Cervical back: Normal range of motion and neck supple  Right lower leg: No edema  Left lower leg: No edema  Lymphadenopathy:      Cervical: No cervical adenopathy  Skin:     General: Skin is warm  Neurological:      General: No focal deficit present  Mental Status: He is alert and oriented to person, place, and time

## 2022-08-12 DIAGNOSIS — E78.5 HYPERLIPIDEMIA, UNSPECIFIED: ICD-10-CM

## 2022-08-12 DIAGNOSIS — I10 ESSENTIAL HYPERTENSION, BENIGN: ICD-10-CM

## 2022-08-12 NOTE — TELEPHONE ENCOUNTER
Pt needs refill on simvastatin and lisinopril   LA:7/6/22  NA:9/7/22    Please send in 90 day supplies

## 2022-08-16 RX ORDER — SIMVASTATIN 20 MG
20 TABLET ORAL
Qty: 90 TABLET | Refills: 1 | Status: SHIPPED | OUTPATIENT
Start: 2022-08-16

## 2022-08-16 RX ORDER — LISINOPRIL 5 MG/1
5 TABLET ORAL DAILY
Qty: 90 TABLET | Refills: 1 | Status: SHIPPED | OUTPATIENT
Start: 2022-08-16

## 2022-08-30 ENCOUNTER — TELEPHONE (OUTPATIENT)
Dept: INTERNAL MEDICINE CLINIC | Facility: CLINIC | Age: 67
End: 2022-08-30

## 2022-08-30 DIAGNOSIS — E78.2 MIXED HYPERLIPIDEMIA: ICD-10-CM

## 2022-08-30 DIAGNOSIS — E11.65 UNCONTROLLED TYPE 2 DIABETES MELLITUS WITH HYPERGLYCEMIA (HCC): Primary | ICD-10-CM

## 2022-08-30 DIAGNOSIS — E11.65 UNCONTROLLED TYPE 2 DIABETES MELLITUS WITH HYPERGLYCEMIA (HCC): ICD-10-CM

## 2022-09-01 ENCOUNTER — RA CDI HCC (OUTPATIENT)
Dept: OTHER | Facility: HOSPITAL | Age: 67
End: 2022-09-01

## 2022-09-01 RX ORDER — DULAGLUTIDE 3 MG/.5ML
3 INJECTION, SOLUTION SUBCUTANEOUS WEEKLY
Qty: 2 ML | Refills: 3 | Status: SHIPPED | OUTPATIENT
Start: 2022-09-01

## 2022-09-01 NOTE — PROGRESS NOTES
[address]  coding opportunities     E11 69     Chart Reviewed number of suggestions sent to Provider: 1     Patients Insurance     Medicare Insurance: [address] [id number]

## 2022-09-04 ENCOUNTER — APPOINTMENT (OUTPATIENT)
Dept: LAB | Facility: CLINIC | Age: 67
End: 2022-09-04
Payer: COMMERCIAL

## 2022-09-04 DIAGNOSIS — E11.65 UNCONTROLLED TYPE 2 DIABETES MELLITUS WITH HYPERGLYCEMIA (HCC): ICD-10-CM

## 2022-09-04 DIAGNOSIS — E78.2 MIXED HYPERLIPIDEMIA: ICD-10-CM

## 2022-09-04 LAB
ALBUMIN SERPL BCP-MCNC: 3.4 G/DL (ref 3.5–5)
ALP SERPL-CCNC: 43 U/L (ref 46–116)
ALT SERPL W P-5'-P-CCNC: 23 U/L (ref 12–78)
ANION GAP SERPL CALCULATED.3IONS-SCNC: 4 MMOL/L (ref 4–13)
AST SERPL W P-5'-P-CCNC: 13 U/L (ref 5–45)
BASOPHILS # BLD AUTO: 0.05 THOUSANDS/ΜL (ref 0–0.1)
BASOPHILS NFR BLD AUTO: 0 % (ref 0–1)
BILIRUB SERPL-MCNC: 0.49 MG/DL (ref 0.2–1)
BUN SERPL-MCNC: 21 MG/DL (ref 5–25)
CALCIUM ALBUM COR SERPL-MCNC: 9.5 MG/DL (ref 8.3–10.1)
CALCIUM SERPL-MCNC: 9 MG/DL (ref 8.3–10.1)
CHLORIDE SERPL-SCNC: 110 MMOL/L (ref 96–108)
CHOLEST SERPL-MCNC: 143 MG/DL
CO2 SERPL-SCNC: 24 MMOL/L (ref 21–32)
CREAT SERPL-MCNC: 1.05 MG/DL (ref 0.6–1.3)
CREAT UR-MCNC: 135 MG/DL
EOSINOPHIL # BLD AUTO: 0.37 THOUSAND/ΜL (ref 0–0.61)
EOSINOPHIL NFR BLD AUTO: 3 % (ref 0–6)
ERYTHROCYTE [DISTWIDTH] IN BLOOD BY AUTOMATED COUNT: 13.3 % (ref 11.6–15.1)
EST. AVERAGE GLUCOSE BLD GHB EST-MCNC: 120 MG/DL
GFR SERPL CREATININE-BSD FRML MDRD: 73 ML/MIN/1.73SQ M
GLUCOSE P FAST SERPL-MCNC: 124 MG/DL (ref 65–99)
HBA1C MFR BLD: 5.8 %
HCT VFR BLD AUTO: 51.2 % (ref 36.5–49.3)
HDLC SERPL-MCNC: 52 MG/DL
HGB BLD-MCNC: 17 G/DL (ref 12–17)
IMM GRANULOCYTES # BLD AUTO: 0.04 THOUSAND/UL (ref 0–0.2)
IMM GRANULOCYTES NFR BLD AUTO: 0 % (ref 0–2)
LDLC SERPL CALC-MCNC: 80 MG/DL (ref 0–100)
LYMPHOCYTES # BLD AUTO: 3.74 THOUSANDS/ΜL (ref 0.6–4.47)
LYMPHOCYTES NFR BLD AUTO: 31 % (ref 14–44)
MCH RBC QN AUTO: 31.4 PG (ref 26.8–34.3)
MCHC RBC AUTO-ENTMCNC: 33.2 G/DL (ref 31.4–37.4)
MCV RBC AUTO: 95 FL (ref 82–98)
MICROALBUMIN UR-MCNC: 9.6 MG/L (ref 0–20)
MICROALBUMIN/CREAT 24H UR: 7 MG/G CREATININE (ref 0–30)
MONOCYTES # BLD AUTO: 1.1 THOUSAND/ΜL (ref 0.17–1.22)
MONOCYTES NFR BLD AUTO: 9 % (ref 4–12)
NEUTROPHILS # BLD AUTO: 6.84 THOUSANDS/ΜL (ref 1.85–7.62)
NEUTS SEG NFR BLD AUTO: 57 % (ref 43–75)
NRBC BLD AUTO-RTO: 0 /100 WBCS
PLATELET # BLD AUTO: 263 THOUSANDS/UL (ref 149–390)
PMV BLD AUTO: 10.3 FL (ref 8.9–12.7)
POTASSIUM SERPL-SCNC: 4.5 MMOL/L (ref 3.5–5.3)
PROT SERPL-MCNC: 6.9 G/DL (ref 6.4–8.4)
RBC # BLD AUTO: 5.42 MILLION/UL (ref 3.88–5.62)
SODIUM SERPL-SCNC: 138 MMOL/L (ref 135–147)
TRIGL SERPL-MCNC: 53 MG/DL
TSH SERPL DL<=0.05 MIU/L-ACNC: 1.35 UIU/ML (ref 0.45–4.5)
WBC # BLD AUTO: 12.14 THOUSAND/UL (ref 4.31–10.16)

## 2022-09-04 PROCEDURE — 82570 ASSAY OF URINE CREATININE: CPT | Performed by: INTERNAL MEDICINE

## 2022-09-04 PROCEDURE — 36415 COLL VENOUS BLD VENIPUNCTURE: CPT

## 2022-09-04 PROCEDURE — 3061F NEG MICROALBUMINURIA REV: CPT | Performed by: INTERNAL MEDICINE

## 2022-09-04 PROCEDURE — 85025 COMPLETE CBC W/AUTO DIFF WBC: CPT

## 2022-09-04 PROCEDURE — 80053 COMPREHEN METABOLIC PANEL: CPT

## 2022-09-04 PROCEDURE — 82043 UR ALBUMIN QUANTITATIVE: CPT | Performed by: INTERNAL MEDICINE

## 2022-09-04 PROCEDURE — 84443 ASSAY THYROID STIM HORMONE: CPT

## 2022-09-04 PROCEDURE — 3044F HG A1C LEVEL LT 7.0%: CPT | Performed by: INTERNAL MEDICINE

## 2022-09-04 PROCEDURE — 80061 LIPID PANEL: CPT

## 2022-09-04 PROCEDURE — 83036 HEMOGLOBIN GLYCOSYLATED A1C: CPT

## 2022-09-07 ENCOUNTER — OFFICE VISIT (OUTPATIENT)
Dept: INTERNAL MEDICINE CLINIC | Facility: CLINIC | Age: 67
End: 2022-09-07
Payer: COMMERCIAL

## 2022-09-07 VITALS
WEIGHT: 236.2 LBS | BODY MASS INDEX: 31.99 KG/M2 | DIASTOLIC BLOOD PRESSURE: 84 MMHG | SYSTOLIC BLOOD PRESSURE: 134 MMHG | HEIGHT: 72 IN | TEMPERATURE: 98.3 F | OXYGEN SATURATION: 95 % | HEART RATE: 88 BPM

## 2022-09-07 DIAGNOSIS — E78.2 MIXED HYPERLIPIDEMIA: ICD-10-CM

## 2022-09-07 DIAGNOSIS — F34.1 DYSTHYMIC DISORDER: ICD-10-CM

## 2022-09-07 DIAGNOSIS — Z23 ENCOUNTER FOR IMMUNIZATION: ICD-10-CM

## 2022-09-07 DIAGNOSIS — I10 ESSENTIAL HYPERTENSION, BENIGN: ICD-10-CM

## 2022-09-07 DIAGNOSIS — E11.65 UNCONTROLLED TYPE 2 DIABETES MELLITUS WITH HYPERGLYCEMIA (HCC): Primary | ICD-10-CM

## 2022-09-07 PROCEDURE — 90677 PCV20 VACCINE IM: CPT

## 2022-09-07 PROCEDURE — G0009 ADMIN PNEUMOCOCCAL VACCINE: HCPCS

## 2022-09-07 PROCEDURE — 99214 OFFICE O/P EST MOD 30 MIN: CPT | Performed by: INTERNAL MEDICINE

## 2022-09-07 PROCEDURE — 3079F DIAST BP 80-89 MM HG: CPT | Performed by: INTERNAL MEDICINE

## 2022-09-07 PROCEDURE — 3075F SYST BP GE 130 - 139MM HG: CPT | Performed by: INTERNAL MEDICINE

## 2022-09-07 PROCEDURE — 1160F RVW MEDS BY RX/DR IN RCRD: CPT | Performed by: INTERNAL MEDICINE

## 2022-09-07 NOTE — PROGRESS NOTES
Assessment/Plan:      Tobacco Cessation Counseling: Tobacco cessation counseling was provided  The patient is sincerely urged to quit consumption of tobacco  He is ready to quit tobacco              1  Uncontrolled type 2 diabetes mellitus with hyperglycemia ([facility] )    2  Mixed hyperlipidemia    3  Dysthymic disorder    4  Essential hypertension, benign         Subjective:      Patient ID: Isidro Alas is a 79 y o  male  Follow up on blood test discussed with him      The following portions of the patient's history were reviewed and updated as appropriate: He  has a past medical history of Acquired hypothyroidism, Anxiety, Anxiety disorder, Cigarette smoker, Colon cancer screening declined, Diabetes mellitus ([facility] ), Diabetes mellitus, type 2 ([facility] ), Dyslipidemia, Essential hypertension, benign, Hyperlipidemia, Hypertension, Kidney stone, and Type II diabetes mellitus, uncontrolled  He   Patient Active Problem List    Diagnosis Date Noted    Aspirin long-term use 06/27/2022    GI bleed 06/26/2022    Hypotension 06/26/2022    Melena 06/26/2022    Needs flu shot 09/17/2021    Body mass index 34 0-34 9, adult 01/12/2021    Medicare annual wellness visit, subsequent 01/12/2021    Obesity, morbid ([facility] ) 01/12/2021    Dyslipidemia     Diabetes mellitus, type 2 ([facility] )     Colon cancer screening declined     Dysthymic disorder 09/16/2020    Body mass index 36 0-36 9, adult 09/16/2020    Hypertension     Mixed hyperlipidemia     Essential hypertension, benign     Uncontrolled type 2 [facility]     Anxiety disorder      He  has a past surgical history that includes Kidney stone surgery and Cardiac catheterization  His family history includes Heart attack in his father; Heart disease in his father  He  reports that he has been smoking  He has a 5 00 pack-year smoking history   He has never used smokeless tobacco  He reports previous alcohol use of about 1 0 standard drink of alcohol per week  He reports current drug use  Drug: Marijuana  Current Outpatient Medications   Medication Sig Dispense Refill    ALPRAZolam (XANAX) 0 5 mg tablet TAKE 1 TABLET BY MOUTH TWICE A DAY AS NEEDED FOR ANXIETY (Patient taking differently: if needed) 60 tablet 0    Dulaglutide (Trulicity) 3 FX/3 5TD SOPN Inject 0 5 mL (3 mg total) under the skin once a week 2 mL 3    lisinopril (ZESTRIL) 5 mg tablet Take 1 tablet (5 mg total) by mouth daily 90 tablet 1    metFORMIN (GLUCOPHAGE) 1000 MG tablet Take 1 tablet (1,000 mg total) by mouth 2 (two) times a day with meals (Patient taking differently: Take 1,000 mg by mouth) 180 tablet 0    patient supplied medication Multi-Betic vitamin        simvastatin (ZOCOR) 20 mg tablet Take 1 tablet (20 mg total) by mouth daily at bedtime 90 tablet 1    Blood Glucose Monitoring Suppl (ONE TOUCH ULTRA 2) w/Device KIT CONTOUR METER, USE 3 TIMES A DAY E11 65 (Patient not taking: Reported on 9/7/2022) 1 kit 0    Lancets MISC Use 3 (three) times a day Contour lancets E11 65 (Patient not taking: Reported on 9/7/2022) 100 each 1    OneTouch Ultra test strip USE 1 EACH 3 (THREE) TIMES A DAY USE AS INSTRUCTED CONTOUR TEST STRIPS E11 65 (Patient not taking: Reported on 9/7/2022) 100 strip 1     No current facility-administered medications for this visit       Current Outpatient Medications on File Prior to Visit   Medication Sig    ALPRAZolam (XANAX) 0 5 mg tablet TAKE 1 TABLET BY MOUTH TWICE A DAY AS NEEDED FOR ANXIETY (Patient taking differently: if needed)    Dulaglutide (Trulicity) 3 AJ/0 1TM SOPN Inject 0 5 mL (3 mg total) under the skin once a week    lisinopril (ZESTRIL) 5 mg tablet Take 1 tablet (5 mg total) by mouth daily    metFORMIN (GLUCOPHAGE) 1000 MG tablet Take 1 tablet (1,000 mg total) by mouth 2 (two) times a day with meals (Patient taking differently: Take 1,000 mg by mouth)    patient supplied medication Multi-Betic vitamin      simvastatin (ZOCOR) 20 mg tablet Take 1 tablet (20 mg total) by mouth daily at bedtime    Blood Glucose Monitoring Suppl (ONE TOUCH ULTRA 2) w/Device KIT CONTOUR METER, USE 3 TIMES A DAY E11 65 (Patient not taking: Reported on 9/7/2022)    Lancets MISC Use 3 (three) times a day Contour lancets E11 65 (Patient not taking: Reported on 9/7/2022)    OneTouch Ultra test strip USE 1 EACH 3 (THREE) TIMES A DAY USE AS INSTRUCTED CONTOUR TEST STRIPS E11 65 (Patient not taking: Reported on 9/7/2022)     No current facility-administered medications on file prior to visit  He has No Known Allergies       Review of Systems   Constitutional: Negative for chills and fever  HENT: Negative for congestion, ear pain and sore throat  Eyes: Negative for pain  Respiratory: Negative for cough and shortness of breath  Cardiovascular: Negative for chest pain and leg swelling  Gastrointestinal: Negative for abdominal pain, nausea and vomiting  Endocrine: Negative for polyuria  Genitourinary: Negative for difficulty urinating, frequency and urgency  Musculoskeletal: Negative for arthralgias and back pain  Skin: Negative for rash  Neurological: Negative for weakness and headaches  Psychiatric/Behavioral: Negative for sleep disturbance  The patient is not nervous/anxious  Objective:      /84 (BP Location: Left arm, Patient Position: Sitting, Cuff Size: Large)   Pulse 88   Temp 98 3 °F (36 8 °C) (Temporal)   Ht 6' (1 829 m)   Wt 107 kg (236 lb 3 2 oz)   SpO2 95%   BMI 32 03 kg/m²     Recent Results (from the past 1344 hour(s))   Microalbumin / creatinine urine ratio    Collection Time: 09/04/22  8:05 AM   Result Value Ref Range    Creatinine, Ur 135 0 mg/dL    Microalbum  ,U,Random 9 6 0 0 - 20 0 mg/L    Microalb Creat Ratio 7 0 - 30 mg/g creatinine   CBC and differential    Collection Time: 09/04/22  8:05 AM   Result Value Ref Range    WBC 12 14 (H) 4 31 - 10 16 Thousand/uL    RBC 5 42 3 88 - 5 62 Million/uL Hemoglobin 17 0 12 0 - 17 0 g/dL    Hematocrit 51 2 (H) 36 5 - 49 3 %    MCV 95 82 - 98 fL    MCH 31 4 26 8 - 34 3 pg    MCHC 33 2 31 4 - 37 4 g/dL    RDW 13 3 11 6 - 15 1 %    MPV 10 3 8 9 - 12 7 fL    Platelets 647 632 - 551 Thousands/uL    nRBC 0 /100 WBCs    Neutrophils Relative 57 43 - 75 %    Immat GRANS % 0 0 - 2 %    Lymphocytes Relative 31 14 - 44 %    Monocytes Relative 9 4 - 12 %    Eosinophils Relative 3 0 - 6 %    Basophils Relative 0 0 - 1 %    Neutrophils Absolute 6 84 1 85 - 7 62 Thousands/µL    Immature Grans Absolute 0 04 0 00 - 0 20 Thousand/uL    Lymphocytes Absolute 3 74 0 60 - 4 47 Thousands/µL    Monocytes Absolute 1 10 0 17 - 1 22 Thousand/µL    Eosinophils Absolute 0 37 0 00 - 0 61 Thousand/µL    Basophils Absolute 0 05 0 00 - 0 10 Thousands/µL   Comprehensive metabolic panel    Collection Time: 09/04/22  8:05 AM   Result Value Ref Range    Sodium 138 135 - 147 mmol/L    Potassium 4 5 3 5 - 5 3 mmol/L    Chloride 110 (H) 96 - 108 mmol/L    CO2 24 21 - 32 mmol/L    ANION GAP 4 4 - 13 mmol/L    BUN 21 5 - 25 mg/dL    Creatinine 1 05 0 60 - 1 30 mg/dL    Glucose, Fasting 124 (H) 65 - 99 mg/dL    Calcium 9 0 8 3 - 10 1 mg/dL    Corrected Calcium 9 5 8 3 - 10 1 mg/dL    AST 13 5 - 45 U/L    ALT 23 12 - 78 U/L    Alkaline Phosphatase 43 (L) 46 - 116 U/L    Total Protein 6 9 6 4 - 8 4 g/dL    Albumin 3 4 (L) 3 5 - 5 0 g/dL    Total Bilirubin 0 49 0 20 - 1 00 mg/dL    eGFR 73 ml/min/1 73sq m   Hemoglobin A1C    Collection Time: 09/04/22  8:05 AM   Result Value Ref Range    Hemoglobin A1C 5 8 (H) Normal 3 8-5 6%; PreDiabetic 5 7-6 4%;  Diabetic >=6 5%; Glycemic control for adults with diabetes <7 0% %     mg/dl   Lipid Panel with Direct LDL reflex    Collection Time: 09/04/22  8:05 AM   Result Value Ref Range    Cholesterol 143 See Comment mg/dL    Triglycerides 53 See Comment mg/dL    HDL, Direct 52 >=40 mg/dL    LDL Calculated 80 0 - 100 mg/dL   TSH, 3rd generation    Collection Time: 09/04/22  8:05 AM   Result Value Ref Range    TSH 3RD GENERATON 1 350 0 450 - 4 500 uIU/mL        Physical Exam  Constitutional:       Appearance: Normal appearance  HENT:      Head: Normocephalic  Right Ear: Tympanic membrane, ear canal and external ear normal       Left Ear: Tympanic membrane, ear canal and external ear normal       Nose: Nose normal  No congestion  Mouth/Throat:      Mouth: Mucous membranes are moist       Pharynx: Oropharynx is clear  No oropharyngeal exudate or posterior oropharyngeal erythema  Eyes:      Extraocular Movements: Extraocular movements intact  Conjunctiva/sclera: Conjunctivae normal       Pupils: Pupils are equal, round, and reactive to light  Cardiovascular:      Rate and Rhythm: Normal rate and regular rhythm  Heart sounds: Normal heart sounds  No murmur heard  Pulmonary:      Effort: Pulmonary effort is normal       Breath sounds: Normal breath sounds  No wheezing or rales  Abdominal:      General: Bowel sounds are normal  There is no distension  Palpations: Abdomen is soft  Tenderness: There is no abdominal tenderness  Musculoskeletal:         General: Normal range of motion  Cervical back: Normal range of motion and neck supple  Right lower leg: No edema  Left lower leg: No edema  Lymphadenopathy:      Cervical: No cervical adenopathy  Skin:     General: Skin is warm  Neurological:      General: No focal deficit present  Mental Status: He is alert and oriented to person, place, and time

## 2022-10-11 PROBLEM — Z00.00 MEDICARE ANNUAL WELLNESS VISIT, SUBSEQUENT: Status: RESOLVED | Noted: 2021-01-12 | Resolved: 2022-10-11

## 2022-12-28 ENCOUNTER — TELEPHONE (OUTPATIENT)
Dept: INTERNAL MEDICINE CLINIC | Facility: CLINIC | Age: 67
End: 2022-12-28

## 2022-12-28 NOTE — TELEPHONE ENCOUNTER
Wait  to   talk  to  DR Castro  next  week    Until  then   just   Dr Funes the [facility]  until  he  speaks  with Dr Caren Huff MD

## 2022-12-28 NOTE — TELEPHONE ENCOUNTER
Patient walked into the office concerning his Trulicity  There is no [facility] within a 20 mile radius along with [address] that do not carry this  There is a shortage  and patient was supposed to have this shot on Monday but is all out  They have 1 5MG and "4 something"  Patient is also taking Metformin 1/2 in the morning and 1/2 at night  His sugars are down to 5 8  Patient is wondering if he stops taking his Metformin if he could take a different strength of Trulicity? Please advise  Patient would like a call back       # [phone]

## 2023-01-03 DIAGNOSIS — E11.65 UNCONTROLLED TYPE 2 DIABETES MELLITUS WITH HYPERGLYCEMIA (HCC): ICD-10-CM

## 2023-01-03 DIAGNOSIS — E11.65 UNCONTROLLED TYPE 2 DIABETES MELLITUS WITH HYPERGLYCEMIA (HCC): Primary | ICD-10-CM

## 2023-01-03 RX ORDER — LANCETS 30 GAUGE
EACH MISCELLANEOUS 3 TIMES DAILY
Qty: 100 EACH | Refills: 0 | Status: SHIPPED | OUTPATIENT
Start: 2023-01-03

## 2023-01-03 RX ORDER — BLOOD SUGAR DIAGNOSTIC
STRIP MISCELLANEOUS
Qty: 100 STRIP | Refills: 0 | Status: SHIPPED | OUTPATIENT
Start: 2023-01-03

## 2023-01-03 RX ORDER — BLOOD-GLUCOSE METER
EACH MISCELLANEOUS
Qty: 1 KIT | Refills: 0 | Status: SHIPPED | OUTPATIENT
Start: 2023-01-03

## 2023-01-19 ENCOUNTER — APPOINTMENT (OUTPATIENT)
Dept: LAB | Facility: CLINIC | Age: 68
End: 2023-01-19

## 2023-01-19 DIAGNOSIS — E11.65 UNCONTROLLED TYPE 2 DIABETES MELLITUS WITH HYPERGLYCEMIA (HCC): ICD-10-CM

## 2023-01-19 DIAGNOSIS — E11.65 UNCONTROLLED TYPE 2 DIABETES MELLITUS WITH HYPERGLYCEMIA (HCC): Primary | ICD-10-CM

## 2023-01-19 LAB
ALBUMIN SERPL BCP-MCNC: 3.7 G/DL (ref 3.5–5)
ALP SERPL-CCNC: 49 U/L (ref 46–116)
ALT SERPL W P-5'-P-CCNC: 25 U/L (ref 12–78)
ANION GAP SERPL CALCULATED.3IONS-SCNC: 2 MMOL/L (ref 4–13)
AST SERPL W P-5'-P-CCNC: 18 U/L (ref 5–45)
BASOPHILS # BLD AUTO: 0.06 THOUSANDS/ÂΜL (ref 0–0.1)
BASOPHILS NFR BLD AUTO: 1 % (ref 0–1)
BILIRUB SERPL-MCNC: 0.41 MG/DL (ref 0.2–1)
BUN SERPL-MCNC: 17 MG/DL (ref 5–25)
CALCIUM SERPL-MCNC: 9.1 MG/DL (ref 8.3–10.1)
CHLORIDE SERPL-SCNC: 108 MMOL/L (ref 96–108)
CHOLEST SERPL-MCNC: 133 MG/DL
CO2 SERPL-SCNC: 28 MMOL/L (ref 21–32)
CREAT SERPL-MCNC: 0.96 MG/DL (ref 0.6–1.3)
CREAT UR-MCNC: 72.7 MG/DL
EOSINOPHIL # BLD AUTO: 0.31 THOUSAND/ÂΜL (ref 0–0.61)
EOSINOPHIL NFR BLD AUTO: 3 % (ref 0–6)
ERYTHROCYTE [DISTWIDTH] IN BLOOD BY AUTOMATED COUNT: 11.5 % (ref 11.6–15.1)
GFR SERPL CREATININE-BSD FRML MDRD: 81 ML/MIN/1.73SQ M
GLUCOSE P FAST SERPL-MCNC: 115 MG/DL (ref 65–99)
HCT VFR BLD AUTO: 53.3 % (ref 36.5–49.3)
HDLC SERPL-MCNC: 48 MG/DL
HGB BLD-MCNC: 17.4 G/DL (ref 12–17)
IMM GRANULOCYTES # BLD AUTO: 0.02 THOUSAND/UL (ref 0–0.2)
IMM GRANULOCYTES NFR BLD AUTO: 0 % (ref 0–2)
LDLC SERPL CALC-MCNC: 71 MG/DL (ref 0–100)
LYMPHOCYTES # BLD AUTO: 3.24 THOUSANDS/ÂΜL (ref 0.6–4.47)
LYMPHOCYTES NFR BLD AUTO: 31 % (ref 14–44)
MCH RBC QN AUTO: 33.1 PG (ref 26.8–34.3)
MCHC RBC AUTO-ENTMCNC: 32.6 G/DL (ref 31.4–37.4)
MCV RBC AUTO: 102 FL (ref 82–98)
MICROALBUMIN UR-MCNC: 7.4 MG/L (ref 0–20)
MICROALBUMIN/CREAT 24H UR: 10 MG/G CREATININE (ref 0–30)
MONOCYTES # BLD AUTO: 1 THOUSAND/ÂΜL (ref 0.17–1.22)
MONOCYTES NFR BLD AUTO: 10 % (ref 4–12)
NEUTROPHILS # BLD AUTO: 5.92 THOUSANDS/ÂΜL (ref 1.85–7.62)
NEUTS SEG NFR BLD AUTO: 55 % (ref 43–75)
NRBC BLD AUTO-RTO: 0 /100 WBCS
PLATELET # BLD AUTO: 249 THOUSANDS/UL (ref 149–390)
PMV BLD AUTO: 10 FL (ref 8.9–12.7)
POTASSIUM SERPL-SCNC: 5.1 MMOL/L (ref 3.5–5.3)
PROT SERPL-MCNC: 7.3 G/DL (ref 6.4–8.4)
RBC # BLD AUTO: 5.25 MILLION/UL (ref 3.88–5.62)
SODIUM SERPL-SCNC: 138 MMOL/L (ref 135–147)
TRIGL SERPL-MCNC: 71 MG/DL
TSH SERPL DL<=0.05 MIU/L-ACNC: 0.88 UIU/ML (ref 0.45–4.5)
WBC # BLD AUTO: 10.55 THOUSAND/UL (ref 4.31–10.16)

## 2023-01-20 LAB
EST. AVERAGE GLUCOSE BLD GHB EST-MCNC: 100 MG/DL
HBA1C MFR BLD: 5.1 %

## 2023-01-23 ENCOUNTER — RA CDI HCC (OUTPATIENT)
Dept: OTHER | Facility: HOSPITAL | Age: 68
End: 2023-01-23

## 2023-01-23 NOTE — PROGRESS NOTES
[facility]  coding opportunities       Chart reviewed, no opportunity found:   [address]        Patients Insurance     Medicare Insurance: [employer]

## 2023-01-24 ENCOUNTER — OFFICE VISIT (OUTPATIENT)
Dept: INTERNAL MEDICINE CLINIC | Facility: CLINIC | Age: 68
End: 2023-01-24

## 2023-01-24 VITALS
SYSTOLIC BLOOD PRESSURE: 122 MMHG | HEIGHT: 72 IN | DIASTOLIC BLOOD PRESSURE: 78 MMHG | TEMPERATURE: 98.1 F | BODY MASS INDEX: 32.23 KG/M2 | HEART RATE: 95 BPM | OXYGEN SATURATION: 99 % | WEIGHT: 238 LBS

## 2023-01-24 DIAGNOSIS — I10 ESSENTIAL HYPERTENSION, BENIGN: ICD-10-CM

## 2023-01-24 DIAGNOSIS — F33.9 DEPRESSION, RECURRENT (HCC): ICD-10-CM

## 2023-01-24 DIAGNOSIS — E11.65 UNCONTROLLED TYPE 2 DIABETES MELLITUS WITH HYPERGLYCEMIA (HCC): Primary | ICD-10-CM

## 2023-01-24 DIAGNOSIS — E66.01 OBESITY, MORBID (HCC): ICD-10-CM

## 2023-01-24 DIAGNOSIS — E78.2 MIXED HYPERLIPIDEMIA: ICD-10-CM

## 2023-01-24 RX ORDER — DULAGLUTIDE 3 MG/.5ML
INJECTION, SOLUTION SUBCUTANEOUS
COMMUNITY
Start: 2023-01-07 | End: 2023-01-24

## 2023-01-24 NOTE — PROGRESS NOTES
Assessment/Plan:    BMI Counseling: Body mass index is 32 28 kg/m²  The BMI is above normal  Nutrition recommendations include decreasing portion sizes, encouraging healthy choices of fruits and vegetables and decreasing fast food intake  Exercise recommendations include moderate physical activity 150 minutes/week  Rationale for BMI follow-up plan is due to patient being overweight or obese  1  Uncontrolled type 2 diabetes mellitus with hyperglycemia (HCC)  -     dulaglutide (Trulicity) 1 5 XD/0 9IE injection; Inject 0 5 mL (1 5 mg total) under the skin once a week    2  Depression, recurrent ([facility] )    3  Obesity, morbid ([facility] )    4  Mixed hyperlipidemia    5  Essential hypertension, benign         Subjective:      Patient ID: Nick Gaines is a 79 y o  male  Follow-up on blood test done on 1/19/2023 test discussed with him      The following portions of the patient's history were reviewed and updated as appropriate: He  has a past medical history of Acquired hypothyroidism, Anxiety, Anxiety disorder, Cigarette smoker, Colon cancer screening declined, Diabetes mellitus ([place] [address] ), Diabetes mellitus, type 2 ([place] [address] ), Dyslipidemia, Essential hypertension, benign, Hyperlipidemia, Hypertension, Kidney stone, and Type II diabetes mellitus, uncontrolled    He   Patient Active Problem List    Diagnosis Date Noted   • Depression, recurrent ([facility] ) 01/24/2023   • Aspirin long-term use 06/27/2022   • GI bleed 06/26/2022   • Hypotension 06/26/2022   • Melena 06/26/2022   • Needs flu shot 09/17/2021   • Body mass index 34 0-34 9, adult 01/12/2021   • Obesity, morbid ([facility] ) 01/12/2021   • Dyslipidemia    • Diabetes mellitus, type 2 ([facility] )    • Colon cancer screening declined    • Dysthymic disorder 09/16/2020   • Body mass index 36 0-36 9, adult 09/16/2020   • Hypertension    • Mixed hyperlipidemia    • Essential hypertension, benign    • Uncontrolled type 2 diabetes mellitus with hyperglycemia (HCC)    • Anxiety disorder      He has a past surgical history that includes Kidney stone surgery and Cardiac catheterization  His family history includes Heart attack in his father; Heart disease in his father  He  reports that he has been smoking cigarettes  He has a 5 00 pack-year smoking history  He has never used smokeless tobacco  He reports that he does not currently use alcohol after a past usage of about 1 0 standard drink per week  He reports current drug use  Drug: Marijuana  Current Outpatient Medications   Medication Sig Dispense Refill   • ALPRAZolam (XANAX) 0 5 mg tablet Take 1 tablet (0 5 mg total) by mouth every 6 (six) hours as needed for anxiety 5 tablet 0   • Blood Glucose Monitoring Suppl (ONE TOUCH ULTRA 2) w/Device KIT CONTOUR METER, USE 3 TIMES A DAY E11 65 1 kit 0   • dulaglutide (Trulicity) 1 5 BH/1 8SP injection Inject 0 5 mL (1 5 mg total) under the skin once a week 4 mL 3   • glucose blood (OneTouch Ultra) test strip Use as instructed 100 strip 0   • Lancets MISC Use 3 (three) times a day Contour lancets E11 65 100 each 0   • lisinopril (ZESTRIL) 5 mg tablet Take 1 tablet (5 mg total) by mouth daily 90 tablet 1   • metFORMIN (GLUCOPHAGE) 1000 MG tablet TAKE 1 TABLET BY MOUTH TWICE A DAY WITH MEALS (Patient taking differently: 1/2 in the AM and 1/2 in the PM) 180 tablet 0   • patient supplied medication Multi-Betic vitamin       • simvastatin (ZOCOR) 20 mg tablet Take 1 tablet (20 mg total) by mouth daily at bedtime 90 tablet 1     No current facility-administered medications for this visit       Current Outpatient Medications on File Prior to Visit   Medication Sig   • ALPRAZolam (XANAX) 0 5 mg tablet Take 1 tablet (0 5 mg total) by mouth every 6 (six) hours as needed for anxiety   • Blood Glucose Monitoring Suppl (ONE TOUCH ULTRA 2) w/Device KIT CONTOUR METER, USE 3 TIMES A DAY E11 65   • glucose blood (OneTouch Ultra) test strip Use as instructed   • Lancets MISC Use 3 (three) times a day Contour lancets E11 65   • lisinopril (ZESTRIL) 5 mg tablet Take 1 tablet (5 mg total) by mouth daily   • metFORMIN (GLUCOPHAGE) 1000 MG tablet TAKE 1 TABLET BY MOUTH TWICE A DAY WITH MEALS (Patient taking differently: 1/2 in the AM and 1/2 in the PM)   • patient supplied medication Multi-Betic vitamin     • simvastatin (ZOCOR) 20 mg tablet Take 1 tablet (20 mg total) by mouth daily at bedtime   • [DISCONTINUED] Dulaglutide 1 5 MG/0 5ML SOPN Inject 1 mL (3 mg total) under the skin once a week   • [DISCONTINUED] Trulicity 3 LB/4 5ZY injection INJECT 1 ML (3 MG TOTAL) UNDER THE SKIN ONCE A WEEK (Patient not taking: Reported on 1/24/2023)     No current facility-administered medications on file prior to visit  He has No Known Allergies       Review of Systems   Constitutional: Negative for chills and fever  HENT: Negative for congestion, ear pain and sore throat  Eyes: Negative for pain  Respiratory: Negative for cough and shortness of breath  Cardiovascular: Negative for chest pain and leg swelling  Gastrointestinal: Negative for abdominal pain, nausea and vomiting  Endocrine: Negative for polyuria  Genitourinary: Negative for difficulty urinating, frequency and urgency  Musculoskeletal: Negative for arthralgias and back pain  Skin: Negative for rash  Neurological: Negative for weakness and headaches  Psychiatric/Behavioral: Negative for sleep disturbance  The patient is not nervous/anxious  Objective:      /78 (BP Location: Left arm, Patient Position: Sitting, Cuff Size: Standard)   Pulse 95   Temp 98 1 °F (36 7 °C) (Temporal)   Ht 6' (1 829 m)   Wt 108 kg (238 lb)   SpO2 99%   BMI 32 28 kg/m²     Recent Results (from the past 1344 hour(s))   Microalbumin / creatinine urine ratio    Collection Time: 01/19/23 10:07 AM   Result Value Ref Range    Creatinine, Ur 72 7 mg/dL    Microalbum  ,U,Random 7 4 0 0 - 20 0 mg/L    Microalb Creat Ratio 10 0 - 30 mg/g creatinine   CBC and differential Collection Time: 01/19/23 10:07 AM   Result Value Ref Range    WBC 10 55 (H) 4 31 - 10 16 Thousand/uL    RBC 5 25 3 88 - 5 62 Million/uL    Hemoglobin 17 4 (H) 12 0 - 17 0 g/dL    Hematocrit 53 3 (H) 36 5 - 49 3 %     (H) 82 - 98 fL    MCH 33 1 26 8 - 34 3 pg    MCHC 32 6 31 4 - 37 4 g/dL    RDW 11 5 (L) 11 6 - 15 1 %    MPV 10 0 8 9 - 12 7 fL    Platelets 957 761 - 556 Thousands/uL    nRBC 0 /100 WBCs    Neutrophils Relative 55 43 - 75 %    Immat GRANS % 0 0 - 2 %    Lymphocytes Relative 31 14 - 44 %    Monocytes Relative 10 4 - 12 %    Eosinophils Relative 3 0 - 6 %    Basophils Relative 1 0 - 1 %    Neutrophils Absolute 5 92 1 85 - 7 62 Thousands/µL    Immature Grans Absolute 0 02 0 00 - 0 20 Thousand/uL    Lymphocytes Absolute 3 24 0 60 - 4 47 Thousands/µL    Monocytes Absolute 1 00 0 17 - 1 22 Thousand/µL    Eosinophils Absolute 0 31 0 00 - 0 61 Thousand/µL    Basophils Absolute 0 06 0 00 - 0 10 Thousands/µL   Comprehensive metabolic panel    Collection Time: 01/19/23 10:07 AM   Result Value Ref Range    Sodium 138 135 - 147 mmol/L    Potassium 5 1 3 5 - 5 3 mmol/L    Chloride 108 96 - 108 mmol/L    CO2 28 21 - 32 mmol/L    ANION GAP 2 (L) 4 - 13 mmol/L    BUN 17 5 - 25 mg/dL    Creatinine 0 96 0 60 - 1 30 mg/dL    Glucose, Fasting 115 (H) 65 - 99 mg/dL    Calcium 9 1 8 3 - 10 1 mg/dL    AST 18 5 - 45 U/L    ALT 25 12 - 78 U/L    Alkaline Phosphatase 49 46 - 116 U/L    Total Protein 7 3 6 4 - 8 4 g/dL    Albumin 3 7 3 5 - 5 0 g/dL    Total Bilirubin 0 41 0 20 - 1 00 mg/dL    eGFR 81 ml/min/1 73sq m   Hemoglobin A1C    Collection Time: 01/19/23 10:07 AM   Result Value Ref Range    Hemoglobin A1C 5 1 Normal 3 8-5 6%; PreDiabetic 5 7-6 4%;  Diabetic >=6 5%; Glycemic control for adults with diabetes <7 0% %     mg/dl   Lipid Panel with Direct LDL reflex    Collection Time: 01/19/23 10:07 AM   Result Value Ref Range    Cholesterol 133 See Comment mg/dL    Triglycerides 71 See Comment mg/dL    HDL, Direct 48 >=40 mg/dL    LDL Calculated 71 0 - 100 mg/dL   TSH, 3rd generation    Collection Time: 01/19/23 10:07 AM   Result Value Ref Range    TSH 3RD GENERATON 0 876 0 450 - 4 500 uIU/mL        Physical Exam  Constitutional:       Appearance: Normal appearance  HENT:      Head: Normocephalic  Right Ear: Tympanic membrane, ear canal and external ear normal       Left Ear: Tympanic membrane, ear canal and external ear normal       Nose: Nose normal  No congestion  Mouth/Throat:      Mouth: Mucous membranes are moist       Pharynx: Oropharynx is clear  No oropharyngeal exudate or posterior oropharyngeal erythema  Eyes:      Extraocular Movements: Extraocular movements intact  Conjunctiva/sclera: Conjunctivae normal       Pupils: Pupils are equal, round, and reactive to light  Cardiovascular:      Rate and Rhythm: Normal rate and regular rhythm  Heart sounds: Normal heart sounds  No murmur heard  Pulmonary:      Effort: Pulmonary effort is normal       Breath sounds: Normal breath sounds  No wheezing or rales  Abdominal:      General: Bowel sounds are normal  There is no distension  Palpations: Abdomen is soft  Tenderness: There is no abdominal tenderness  Musculoskeletal:         General: Normal range of motion  Cervical back: Normal range of motion and neck supple  Right lower leg: No edema  Left lower leg: No edema  Lymphadenopathy:      Cervical: No cervical adenopathy  Skin:     General: Skin is warm  Neurological:      General: No focal deficit present  Mental Status: He is alert and oriented to person, place, and time

## 2023-02-10 DIAGNOSIS — E78.5 HYPERLIPIDEMIA, UNSPECIFIED: ICD-10-CM

## 2023-02-10 DIAGNOSIS — I10 ESSENTIAL HYPERTENSION, BENIGN: ICD-10-CM

## 2023-02-10 RX ORDER — LISINOPRIL 5 MG/1
5 TABLET ORAL DAILY
Qty: 90 TABLET | Refills: 1 | Status: SHIPPED | OUTPATIENT
Start: 2023-02-10

## 2023-02-10 RX ORDER — SIMVASTATIN 20 MG
20 TABLET ORAL
Qty: 90 TABLET | Refills: 1 | Status: SHIPPED | OUTPATIENT
Start: 2023-02-10

## 2023-05-10 DIAGNOSIS — E11.65 UNCONTROLLED TYPE 2 DIABETES MELLITUS WITH HYPERGLYCEMIA (HCC): Primary | ICD-10-CM

## 2023-05-19 ENCOUNTER — APPOINTMENT (OUTPATIENT)
Dept: LAB | Facility: CLINIC | Age: 68
End: 2023-05-19

## 2023-05-19 DIAGNOSIS — E11.65 UNCONTROLLED TYPE 2 DIABETES MELLITUS WITH HYPERGLYCEMIA (HCC): ICD-10-CM

## 2023-05-19 LAB
ALBUMIN SERPL BCP-MCNC: 3.9 G/DL (ref 3.5–5)
ALP SERPL-CCNC: 39 U/L (ref 46–116)
ALT SERPL W P-5'-P-CCNC: 33 U/L (ref 12–78)
ANION GAP SERPL CALCULATED.3IONS-SCNC: -1 MMOL/L (ref 4–13)
AST SERPL W P-5'-P-CCNC: 19 U/L (ref 5–45)
BASOPHILS # BLD AUTO: 0.06 THOUSANDS/ÂΜL (ref 0–0.1)
BASOPHILS NFR BLD AUTO: 1 % (ref 0–1)
BILIRUB SERPL-MCNC: 0.9 MG/DL (ref 0.2–1)
BUN SERPL-MCNC: 18 MG/DL (ref 5–25)
CALCIUM SERPL-MCNC: 9 MG/DL (ref 8.3–10.1)
CHLORIDE SERPL-SCNC: 109 MMOL/L (ref 96–108)
CHOLEST SERPL-MCNC: 123 MG/DL
CO2 SERPL-SCNC: 26 MMOL/L (ref 21–32)
CREAT SERPL-MCNC: 0.99 MG/DL (ref 0.6–1.3)
CREAT UR-MCNC: 146 MG/DL
EOSINOPHIL # BLD AUTO: 0.39 THOUSAND/ÂΜL (ref 0–0.61)
EOSINOPHIL NFR BLD AUTO: 3 % (ref 0–6)
ERYTHROCYTE [DISTWIDTH] IN BLOOD BY AUTOMATED COUNT: 13 % (ref 11.6–15.1)
EST. AVERAGE GLUCOSE BLD GHB EST-MCNC: 108 MG/DL
GFR SERPL CREATININE-BSD FRML MDRD: 77 ML/MIN/1.73SQ M
GLUCOSE P FAST SERPL-MCNC: 157 MG/DL (ref 65–99)
HBA1C MFR BLD: 5.4 %
HCT VFR BLD AUTO: 48.6 % (ref 36.5–49.3)
HDLC SERPL-MCNC: 41 MG/DL
HGB BLD-MCNC: 16 G/DL (ref 12–17)
IMM GRANULOCYTES # BLD AUTO: 0.03 THOUSAND/UL (ref 0–0.2)
IMM GRANULOCYTES NFR BLD AUTO: 0 % (ref 0–2)
LDLC SERPL CALC-MCNC: 62 MG/DL (ref 0–100)
LYMPHOCYTES # BLD AUTO: 3.74 THOUSANDS/ÂΜL (ref 0.6–4.47)
LYMPHOCYTES NFR BLD AUTO: 32 % (ref 14–44)
MCH RBC QN AUTO: 33 PG (ref 26.8–34.3)
MCHC RBC AUTO-ENTMCNC: 32.9 G/DL (ref 31.4–37.4)
MCV RBC AUTO: 100 FL (ref 82–98)
MICROALBUMIN UR-MCNC: 15.4 MG/L (ref 0–20)
MICROALBUMIN/CREAT 24H UR: 11 MG/G CREATININE (ref 0–30)
MONOCYTES # BLD AUTO: 1.05 THOUSAND/ÂΜL (ref 0.17–1.22)
MONOCYTES NFR BLD AUTO: 9 % (ref 4–12)
NEUTROPHILS # BLD AUTO: 6.38 THOUSANDS/ÂΜL (ref 1.85–7.62)
NEUTS SEG NFR BLD AUTO: 55 % (ref 43–75)
NRBC BLD AUTO-RTO: 0 /100 WBCS
PLATELET # BLD AUTO: 252 THOUSANDS/UL (ref 149–390)
PMV BLD AUTO: 9.8 FL (ref 8.9–12.7)
POTASSIUM SERPL-SCNC: 4.2 MMOL/L (ref 3.5–5.3)
PROT SERPL-MCNC: 6.9 G/DL (ref 6.4–8.4)
RBC # BLD AUTO: 4.85 MILLION/UL (ref 3.88–5.62)
SODIUM SERPL-SCNC: 134 MMOL/L (ref 135–147)
TRIGL SERPL-MCNC: 102 MG/DL
TSH SERPL DL<=0.05 MIU/L-ACNC: 1.77 UIU/ML (ref 0.45–4.5)
WBC # BLD AUTO: 11.65 THOUSAND/UL (ref 4.31–10.16)

## 2023-05-22 ENCOUNTER — RA CDI HCC (OUTPATIENT)
Dept: OTHER | Facility: HOSPITAL | Age: 68
End: 2023-05-22

## 2023-05-22 NOTE — PROGRESS NOTES
[facility]  coding opportunities       Chart reviewed, no opportunity found: CHART REVIEWED, [id number]     Patients Insurance     Medicare Insurance: [employer]

## 2023-05-23 ENCOUNTER — OFFICE VISIT (OUTPATIENT)
Dept: INTERNAL MEDICINE CLINIC | Facility: CLINIC | Age: 68
End: 2023-05-23

## 2023-05-23 DIAGNOSIS — E53.8 VITAMIN B12 DEFICIENCY: ICD-10-CM

## 2023-05-23 DIAGNOSIS — E11.65 UNCONTROLLED TYPE 2 DIABETES MELLITUS WITH HYPERGLYCEMIA (HCC): Primary | ICD-10-CM

## 2023-05-23 DIAGNOSIS — E55.9 VITAMIN D DEFICIENCY: ICD-10-CM

## 2023-05-23 DIAGNOSIS — I10 ESSENTIAL HYPERTENSION, BENIGN: ICD-10-CM

## 2023-05-23 DIAGNOSIS — E78.2 MIXED HYPERLIPIDEMIA: ICD-10-CM

## 2023-05-23 DIAGNOSIS — F34.1 DYSTHYMIC DISORDER: ICD-10-CM

## 2023-05-23 DIAGNOSIS — Z00.00 MEDICARE ANNUAL WELLNESS VISIT, SUBSEQUENT: ICD-10-CM

## 2023-05-23 RX ORDER — AMOXICILLIN 500 MG/1
CAPSULE ORAL
COMMUNITY
Start: 2023-02-17

## 2023-05-23 NOTE — PROGRESS NOTES
Diabetic Foot Exam    Patient's shoes and socks removed  Right Foot/Ankle   Right Foot Inspection  Skin Exam: skin normal and skin intact  No dry skin, no warmth, no callus, no erythema, no maceration, no abnormal color, no pre-ulcer, no ulcer and no callus  Sensory   Monofilament testing: intact    Vascular  Capillary refills: < 3 seconds  The right DP pulse is 2+  The right PT pulse is 2+  Left Foot/Ankle  Left Foot Inspection  Skin Exam: skin normal and skin intact  No dry skin, no warmth, no erythema, no maceration, normal color, no pre-ulcer, no ulcer and no callus  Sensory   Monofilament testing: diminished    Vascular  Capillary refills: < 3 seconds  The left DP pulse is 2+  The left PT pulse is 2+       Assign Risk Category  No deformity present  Loss of protective sensation  No weak pulses  Risk: 1

## 2023-05-23 NOTE — PROGRESS NOTES
Assessment and Plan:     Problem List Items Addressed This Visit        Endocrine    Uncontrolled type 2 diabetes mellitus with hyperglycemia ([facility] [address] ) - Primary       Cardiovascular and Mediastinum    Essential hypertension, benign       Other    Mixed hyperlipidemia    Dysthymic disorder   Other Visit Diagnoses     Vitamin B12 deficiency        Relevant Orders    Methylmalonic acid, serum    Vitamin B12    Vitamin D deficiency        Relevant Orders    Vitamin D 25 hydroxy    Medicare annual wellness visit, subsequent               Preventive health issues were discussed with patient, and age appropriate screening tests were ordered as noted in patient's After Visit Summary  Personalized health advice and appropriate referrals for health education or preventive services given if needed, as noted in patient's After Visit Summary  History of Present Illness:     Patient presents for a Medicare Wellness Visit    Follow-up on blood test done on 5/19/2023 test discussed with him, also Medicare wellness exam     Patient Care Team:  Konrad Davis MD as PCP - General (Internal Medicine)     Review of Systems:     Review of Systems   Constitutional: Negative for chills and fever  HENT: Positive for congestion  Negative for ear pain and sore throat  Eyes: Negative for pain  Respiratory: Positive for cough  Negative for shortness of breath  Cardiovascular: Negative for chest pain and leg swelling  Gastrointestinal: Negative for abdominal pain, nausea and vomiting  Endocrine: Negative for polyuria  Genitourinary: Negative for difficulty urinating, frequency and urgency  Musculoskeletal: Negative for arthralgias and back pain  Skin: Negative for rash  Neurological: Negative for weakness and headaches  Psychiatric/Behavioral: Negative for sleep disturbance  The patient is not nervous/anxious           Problem List:     Patient Active Problem List   Diagnosis   • Hypertension   • Mixed hyperlipidemia   • Essential hypertension, benign   • Uncontrolled type 2 diabetes mellitus with hyperglycemia (HCC)   • Anxiety disorder   • Dysthymic disorder   • Body mass index 36 0-36 9, adult   • Dyslipidemia   • Diabetes mellitus, type 2 (HCC)   • Colon cancer screening declined   • Body mass index 34 0-34 9, adult   • Obesity, morbid (HCC)   • Needs flu shot   • GI bleed   • Hypotension   • Melena   • Aspirin long-term use   • Depression, recurrent (HCC)      Past Medical and Surgical History:     Past Medical History:   Diagnosis Date   • Acquired hypothyroidism    • Anxiety    • Anxiety disorder    • Cigarette smoker    • Colon cancer screening declined     does understand the risk of missing colon cancer - As per [employer]   • Diabetes mellitus ([facility] [address] )    • Diabetes mellitus, type 2 ([facility] [address] )    • Dyslipidemia    • Essential hypertension, benign    • Hyperlipidemia    • Hypertension    • Kidney stone    • Type II diabetes mellitus, uncontrolled      Past Surgical History:   Procedure Laterality Date   • CARDIAC CATHETERIZATION      09 no obstructive- Dr Connell Plant   • [facility]      ?  s/p stone removal        Family History:     Family History   Problem Relation Age of Onset   • Heart disease Father    • Heart attack Father       Social History:     Social History     Socioeconomic History   • Marital status: /Civil Union     Spouse name: None   • Number of children: 2   • Years of education: None   • Highest education level: None   Occupational History   • None   Tobacco Use   • Smoking status: Every Day     Packs/day: 1 00     Years: 5 00     Pack years: 5 00     Types: Cigarettes     Last attempt to quit: 2018     Years since quittin 6   • Smokeless tobacco: Never   Vaping Use   • Vaping Use: Never used   Substance and Sexual Activity   • Alcohol use: Not Currently     Alcohol/week: 1 0 standard drink     Types: 1 Glasses of wine per week   • Drug use: Yes     Types: Marijuana   • Sexual activity: Yes   Other Topics Concern   • None   Social History Narrative    No alcohol use    Children: 2 daughters    Exercise: No    Caffeine: No      - As per [employer]     Social Determinants of Health     Financial Resource Strain: Low Risk    • Difficulty of Paying Living Expenses: Not hard at all   Food Insecurity: Unknown   • Worried About Running Out of Food in the Last Year: Patient refused   • [address] in the Last Year: Patient refused   Transportation Needs: No Transportation Needs   • Lack of Transportation (Medical): No   • Lack of Transportation (Non-Medical):  No   Physical Activity: Not on file   Stress: Not on file   Social Connections: Not on file   Intimate Partner Violence: Not on file   Housing Stability: Low Risk    • Unable to Pay for Housing in the Last Year: No   • Number of Places Lived in the Last Year: 1   • Unstable Housing in the Last Year: No      Medications and Allergies:     Current Outpatient Medications   Medication Sig Dispense Refill   • ALPRAZolam (XANAX) 0 5 mg tablet Take 1 tablet (0 5 mg total) by mouth every 6 (six) hours as needed for anxiety 5 tablet 0   • amoxicillin (AMOXIL) 500 mg capsule TAKE 1 CAPSULE BY MOUTH THREE TIMES A DAY UNTIL ALL GONE     • Blood Glucose Monitoring Suppl (ONE TOUCH ULTRA 2) w/Device KIT CONTOUR METER, USE 3 TIMES A DAY E11 65 1 kit 0   • dulaglutide (Trulicity) 1 5 GI/2 8WW injection Inject 0 5 mL (1 5 mg total) under the skin once a week 4 mL 3   • glucose blood (OneTouch Ultra) test strip Use as instructed 100 strip 0   • Lancets MISC Use 3 (three) times a day Contour lancets E11 65 100 each 0   • lisinopril (ZESTRIL) 5 mg tablet Take 1 tablet (5 mg total) by mouth daily 90 tablet 1   • metFORMIN (GLUCOPHAGE) 1000 MG tablet TAKE 1 TABLET BY MOUTH TWICE A DAY WITH MEALS (Patient taking differently: 1/2 in the AM and 1/2 in the PM) 180 tablet 0   • patient supplied medication Multi-Betic vitamin       • simvastatin (ZOCOR) 20 mg tablet Take 1 tablet (20 mg total) by mouth daily at bedtime 90 tablet 1     No current facility-administered medications for this visit  No Known Allergies   Immunizations:     Immunization History   Administered Date(s) Administered   • COVID-19 MODERNA VACC 0 25 ML IM BOOSTER 11/17/2021   • COVID-19 MODERNA VACC 0 5 ML IM 04/14/2021, 05/10/2021   • COVID-19 Moderna Vac BIVALENT 12 Yr+ IM (BOOSTER ONLY) 0 5 ML 10/28/2022   • INFLUENZA 09/16/2020   • Influenza, high dose seasonal 0 7 mL 09/17/2021, 09/28/2022   • Pneumococcal Conjugate Vaccine 20-valent (Pcv20), Polysace 09/07/2022   • influenza, trivalent, adjuvanted 09/16/2020      Health Maintenance:         Topic Date Due   • Colorectal Cancer Screening  06/26/2029   • Hepatitis C Screening  Completed     There are no preventive care reminders to display for this patient  Medicare Screening Tests and Risk Assessments:     Dominik is here for his Subsequent Wellness visit  Last Medicare Wellness visit information reviewed, patient interviewed and updates made to the record today  Health Risk Assessment:   Patient rates overall health as good  Patient feels that their physical health rating is same  Patient is satisfied with their life  Eyesight was rated as same  Hearing was rated as same  Patient feels that their emotional and mental health rating is same  Patients states they are never, rarely angry  Patient states they are sometimes unusually tired/fatigued  Pain experienced in the last 7 days has been none  Patient states that he has experienced weight loss or gain in last 6 months  Fall Risk Screening: In the past year, patient has experienced: no history of falling in past year      Home Safety:  Patient does not have trouble with stairs inside or outside of their home  Patient has working smoke alarms and has working carbon monoxide detector  Home safety hazards include: none  Nutrition:   Current diet is Diabetic and No Added Salt  Medications:   Patient is currently taking over-the-counter supplements  OTC medications include: see medication list  Patient is able to manage medications  Activities of Daily Living (ADLs)/Instrumental Activities of Daily Living (IADLs):   Walk and transfer into and out of bed and chair?: Yes  Dress and groom yourself?: Yes    Bathe or shower yourself?: Yes    Feed yourself? Yes  Do your laundry/housekeeping?: Yes  Manage your money, pay your bills and track your expenses?: Yes  Make your own meals?: Yes    Do your own shopping?: Yes    Previous Hospitalizations:   Any hospitalizations or ED visits within the last 12 months?: Yes      Advance Care Planning:   Living will: No    Durable POA for healthcare: No    Advanced directive: No    Five wishes given: Yes      Cognitive Screening:   Provider or family/friend/caregiver concerned regarding cognition?: No    PREVENTIVE SCREENINGS      Cardiovascular Screening:    General: Screening Not Indicated and History Lipid Disorder      Diabetes Screening:     General: Screening Not Indicated and History Diabetes      Colorectal Cancer Screening:     General: Screening Current      Abdominal Aortic Aneurysm (AAA) Screening:    Risk factors include: age between 73-69 yo and tobacco use        Lung Cancer Screening:     General: Screening Not Indicated      Hepatitis C Screening:    General: Screening Current    Screening, Brief Intervention, and Referral to Treatment (SBIRT)    Screening  Typical number of drinks in a day: 0  Typical number of drinks in a week: 0  Interpretation: Low risk drinking behavior  Single Item Drug Screening:  How often have you used an illegal drug (including marijuana) or a prescription medication for non-medical reasons in the past year? never    Single Item Drug Screen Score: 0  Interpretation: Negative screen for possible drug use disorder    No results found       Physical Exam:     There were no vitals taken for this visit     Physical Exam  Vitals and nursing note reviewed  Constitutional:       General: He is not in acute distress  Appearance: He is well-developed  HENT:      Head: Normocephalic and atraumatic  Right Ear: Tympanic membrane, ear canal and external ear normal       Left Ear: Tympanic membrane, ear canal and external ear normal       Mouth/Throat:      Pharynx: Oropharynx is clear  Eyes:      Extraocular Movements: Extraocular movements intact  Conjunctiva/sclera: Conjunctivae normal    Cardiovascular:      Rate and Rhythm: Normal rate and regular rhythm  Heart sounds: Normal heart sounds  No murmur heard  Pulmonary:      Effort: Pulmonary effort is normal  No respiratory distress  Breath sounds: Normal breath sounds  Abdominal:      Palpations: Abdomen is soft  Tenderness: There is no abdominal tenderness  Musculoskeletal:         General: No swelling  Cervical back: Neck supple  Right lower leg: No edema  Left lower leg: No edema  Skin:     General: Skin is warm and dry  Capillary Refill: Capillary refill takes less than 2 seconds  Neurological:      General: No focal deficit present  Mental Status: He is alert and oriented to person, place, and time     Psychiatric:         Mood and Affect: Mood normal           Gardenia Coello MD

## 2023-05-24 ENCOUNTER — APPOINTMENT (OUTPATIENT)
Dept: LAB | Facility: CLINIC | Age: 68
End: 2023-05-24

## 2023-05-24 DIAGNOSIS — E55.9 VITAMIN D DEFICIENCY: ICD-10-CM

## 2023-05-24 DIAGNOSIS — E53.8 VITAMIN B12 DEFICIENCY: ICD-10-CM

## 2023-05-24 LAB
25(OH)D3 SERPL-MCNC: 65 NG/ML (ref 30–100)
VIT B12 SERPL-MCNC: 577 PG/ML (ref 180–914)

## 2023-05-31 LAB — METHYLMALONATE SERPL-SCNC: 184 NMOL/L (ref 0–378)

## 2023-06-01 ENCOUNTER — TELEPHONE (OUTPATIENT)
Dept: INTERNAL MEDICINE CLINIC | Facility: CLINIC | Age: 68
End: 2023-06-01

## 2023-06-01 NOTE — TELEPHONE ENCOUNTER
----- Message from Pia Loredo MD sent at 5/31/2023  8:51 AM EDT -----  Please let him know his blood test looks good

## 2023-07-08 ENCOUNTER — ANESTHESIA (EMERGENCY)
Dept: PERIOP | Facility: HOSPITAL | Age: 68
DRG: 853 | End: 2023-07-08
Payer: COMMERCIAL

## 2023-07-08 ENCOUNTER — APPOINTMENT (EMERGENCY)
Dept: CT IMAGING | Facility: HOSPITAL | Age: 68
DRG: 853 | End: 2023-07-08
Payer: COMMERCIAL

## 2023-07-08 ENCOUNTER — APPOINTMENT (INPATIENT)
Dept: RADIOLOGY | Facility: HOSPITAL | Age: 68
DRG: 853 | End: 2023-07-08
Payer: COMMERCIAL

## 2023-07-08 ENCOUNTER — HOSPITAL ENCOUNTER (INPATIENT)
Facility: HOSPITAL | Age: 68
LOS: 2 days | Discharge: HOME | DRG: 853 | End: 2023-07-10
Attending: EMERGENCY MEDICINE | Admitting: SURGERY
Payer: COMMERCIAL

## 2023-07-08 ENCOUNTER — ANESTHESIA EVENT (EMERGENCY)
Dept: PERIOP | Facility: HOSPITAL | Age: 68
DRG: 853 | End: 2023-07-08
Payer: COMMERCIAL

## 2023-07-08 DIAGNOSIS — K35.30 ACUTE APPENDICITIS WITH LOCALIZED PERITONITIS, WITHOUT PERFORATION, ABSCESS, OR GANGRENE: Primary | ICD-10-CM

## 2023-07-08 DIAGNOSIS — R09.02 HYPOXEMIA: ICD-10-CM

## 2023-07-08 DIAGNOSIS — J98.11 ATELECTASIS: ICD-10-CM

## 2023-07-08 DIAGNOSIS — R06.00 DYSPNEA: ICD-10-CM

## 2023-07-08 PROBLEM — K35.80 ACUTE APPENDICITIS: Status: ACTIVE | Noted: 2023-07-08

## 2023-07-08 PROBLEM — A41.9 CLINICAL SEPSIS (HCC): Status: ACTIVE | Noted: 2023-07-08

## 2023-07-08 LAB
ALBUMIN SERPL BCP-MCNC: 4.4 G/DL (ref 3.5–5)
ALP SERPL-CCNC: 37 U/L (ref 34–104)
ALT SERPL W P-5'-P-CCNC: 22 U/L (ref 7–52)
ANION GAP SERPL CALCULATED.3IONS-SCNC: 6 MMOL/L
AST SERPL W P-5'-P-CCNC: 17 U/L (ref 13–39)
BACTERIA UR QL AUTO: NORMAL /HPF
BASOPHILS # BLD AUTO: 0.06 THOUSANDS/ÂΜL (ref 0–0.1)
BASOPHILS NFR BLD AUTO: 0 % (ref 0–1)
BILIRUB SERPL-MCNC: 0.78 MG/DL (ref 0.2–1)
BILIRUB UR QL STRIP: NEGATIVE
BUN SERPL-MCNC: 20 MG/DL (ref 5–25)
CALCIUM SERPL-MCNC: 9.6 MG/DL (ref 8.4–10.2)
CHLORIDE SERPL-SCNC: 103 MMOL/L (ref 96–108)
CLARITY UR: ABNORMAL
CO2 SERPL-SCNC: 29 MMOL/L (ref 21–32)
COLOR UR: YELLOW
CREAT SERPL-MCNC: 0.9 MG/DL (ref 0.6–1.3)
EOSINOPHIL # BLD AUTO: 0.14 THOUSAND/ÂΜL (ref 0–0.61)
EOSINOPHIL NFR BLD AUTO: 1 % (ref 0–6)
ERYTHROCYTE [DISTWIDTH] IN BLOOD BY AUTOMATED COUNT: 12.7 % (ref 11.6–15.1)
GFR SERPL CREATININE-BSD FRML MDRD: 87 ML/MIN/1.73SQ M
GLUCOSE SERPL-MCNC: 125 MG/DL (ref 65–140)
GLUCOSE SERPL-MCNC: 173 MG/DL (ref 65–140)
GLUCOSE UR STRIP-MCNC: NEGATIVE MG/DL
HCT VFR BLD AUTO: 49.8 % (ref 36.5–49.3)
HGB BLD-MCNC: 16.5 G/DL (ref 12–17)
HGB UR QL STRIP.AUTO: NEGATIVE
IMM GRANULOCYTES # BLD AUTO: 0.08 THOUSAND/UL (ref 0–0.2)
IMM GRANULOCYTES NFR BLD AUTO: 1 % (ref 0–2)
KETONES UR STRIP-MCNC: NEGATIVE MG/DL
LEUKOCYTE ESTERASE UR QL STRIP: NEGATIVE
LIPASE SERPL-CCNC: 23 U/L (ref 11–82)
LYMPHOCYTES # BLD AUTO: 2.08 THOUSANDS/ÂΜL (ref 0.6–4.47)
LYMPHOCYTES NFR BLD AUTO: 13 % (ref 14–44)
MCH RBC QN AUTO: 32.9 PG (ref 26.8–34.3)
MCHC RBC AUTO-ENTMCNC: 33.1 G/DL (ref 31.4–37.4)
MCV RBC AUTO: 99 FL (ref 82–98)
MONOCYTES # BLD AUTO: 1.67 THOUSAND/ÂΜL (ref 0.17–1.22)
MONOCYTES NFR BLD AUTO: 11 % (ref 4–12)
NEUTROPHILS # BLD AUTO: 11.6 THOUSANDS/ÂΜL (ref 1.85–7.62)
NEUTS SEG NFR BLD AUTO: 74 % (ref 43–75)
NITRITE UR QL STRIP: NEGATIVE
NON-SQ EPI CELLS URNS QL MICRO: NORMAL /HPF
NRBC BLD AUTO-RTO: 0 /100 WBCS
PH UR STRIP.AUTO: 8.5 [PH]
PLATELET # BLD AUTO: 214 THOUSANDS/UL (ref 149–390)
PMV BLD AUTO: 10.1 FL (ref 8.9–12.7)
POTASSIUM SERPL-SCNC: 4.4 MMOL/L (ref 3.5–5.3)
PROT SERPL-MCNC: 6.6 G/DL (ref 6.4–8.4)
PROT UR STRIP-MCNC: ABNORMAL MG/DL
RBC # BLD AUTO: 5.02 MILLION/UL (ref 3.88–5.62)
RBC #/AREA URNS AUTO: NORMAL /HPF
SODIUM SERPL-SCNC: 138 MMOL/L (ref 135–147)
SP GR UR STRIP.AUTO: >1.03 (ref 1–1.03)
UROBILINOGEN UR STRIP-ACNC: <2 MG/DL
WBC # BLD AUTO: 15.63 THOUSAND/UL (ref 4.31–10.16)
WBC #/AREA URNS AUTO: NORMAL /HPF

## 2023-07-08 PROCEDURE — 96365 THER/PROPH/DIAG IV INF INIT: CPT

## 2023-07-08 PROCEDURE — 82948 REAGENT STRIP/BLOOD GLUCOSE: CPT

## 2023-07-08 PROCEDURE — 74177 CT ABD & PELVIS W/CONTRAST: CPT

## 2023-07-08 PROCEDURE — 81001 URINALYSIS AUTO W/SCOPE: CPT

## 2023-07-08 PROCEDURE — 44970 LAPAROSCOPY APPENDECTOMY: CPT | Performed by: SURGERY

## 2023-07-08 PROCEDURE — 80053 COMPREHEN METABOLIC PANEL: CPT | Performed by: EMERGENCY MEDICINE

## 2023-07-08 PROCEDURE — 71045 X-RAY EXAM CHEST 1 VIEW: CPT

## 2023-07-08 PROCEDURE — 83690 ASSAY OF LIPASE: CPT | Performed by: EMERGENCY MEDICINE

## 2023-07-08 PROCEDURE — 99284 EMERGENCY DEPT VISIT MOD MDM: CPT

## 2023-07-08 PROCEDURE — 36415 COLL VENOUS BLD VENIPUNCTURE: CPT

## 2023-07-08 PROCEDURE — 85025 COMPLETE CBC W/AUTO DIFF WBC: CPT | Performed by: EMERGENCY MEDICINE

## 2023-07-08 PROCEDURE — 99285 EMERGENCY DEPT VISIT HI MDM: CPT | Performed by: EMERGENCY MEDICINE

## 2023-07-08 PROCEDURE — 88304 TISSUE EXAM BY PATHOLOGIST: CPT | Performed by: PATHOLOGY

## 2023-07-08 PROCEDURE — 44970 LAPAROSCOPY APPENDECTOMY: CPT | Performed by: PHYSICIAN ASSISTANT

## 2023-07-08 PROCEDURE — 96361 HYDRATE IV INFUSION ADD-ON: CPT

## 2023-07-08 PROCEDURE — G1004 CDSM NDSC: HCPCS

## 2023-07-08 PROCEDURE — 0DTJ4ZZ RESECTION OF APPENDIX, PERCUTANEOUS ENDOSCOPIC APPROACH: ICD-10-PCS | Performed by: SURGERY

## 2023-07-08 PROCEDURE — 99223 1ST HOSP IP/OBS HIGH 75: CPT | Performed by: PHYSICIAN ASSISTANT

## 2023-07-08 RX ORDER — LABETALOL HYDROCHLORIDE 5 MG/ML
10 INJECTION, SOLUTION INTRAVENOUS EVERY 6 HOURS PRN
Status: DISCONTINUED | OUTPATIENT
Start: 2023-07-08 | End: 2023-07-10 | Stop reason: HOSPADM

## 2023-07-08 RX ORDER — HYDROMORPHONE HCL/PF 1 MG/ML
0.5 SYRINGE (ML) INJECTION EVERY 4 HOURS PRN
Status: DISCONTINUED | OUTPATIENT
Start: 2023-07-08 | End: 2023-07-10

## 2023-07-08 RX ORDER — BUPIVACAINE HYDROCHLORIDE AND EPINEPHRINE 2.5; 5 MG/ML; UG/ML
INJECTION, SOLUTION EPIDURAL; INFILTRATION; INTRACAUDAL; PERINEURAL AS NEEDED
Status: DISCONTINUED | OUTPATIENT
Start: 2023-07-08 | End: 2023-07-08 | Stop reason: HOSPADM

## 2023-07-08 RX ORDER — HYDROMORPHONE HCL/PF 1 MG/ML
0.5 SYRINGE (ML) INJECTION
Status: DISCONTINUED | OUTPATIENT
Start: 2023-07-08 | End: 2023-07-08 | Stop reason: HOSPADM

## 2023-07-08 RX ORDER — METRONIDAZOLE 500 MG/100ML
500 INJECTION, SOLUTION INTRAVENOUS EVERY 8 HOURS
Status: DISCONTINUED | OUTPATIENT
Start: 2023-07-08 | End: 2023-07-08 | Stop reason: SDUPTHER

## 2023-07-08 RX ORDER — METRONIDAZOLE 500 MG/100ML
500 INJECTION, SOLUTION INTRAVENOUS EVERY 8 HOURS
Status: DISCONTINUED | OUTPATIENT
Start: 2023-07-08 | End: 2023-07-10 | Stop reason: HOSPADM

## 2023-07-08 RX ORDER — OXYCODONE HYDROCHLORIDE AND ACETAMINOPHEN 5; 325 MG/1; MG/1
1 TABLET ORAL EVERY 4 HOURS PRN
Qty: 10 TABLET | Refills: 0 | Status: SHIPPED | OUTPATIENT
Start: 2023-07-08 | End: 2023-07-09

## 2023-07-08 RX ORDER — CEFAZOLIN SODIUM 2 G/50ML
SOLUTION INTRAVENOUS AS NEEDED
Status: DISCONTINUED | OUTPATIENT
Start: 2023-07-08 | End: 2023-07-08

## 2023-07-08 RX ORDER — SUCCINYLCHOLINE/SOD CL,ISO/PF 100 MG/5ML
SYRINGE (ML) INTRAVENOUS AS NEEDED
Status: DISCONTINUED | OUTPATIENT
Start: 2023-07-08 | End: 2023-07-08

## 2023-07-08 RX ORDER — ALBUTEROL SULFATE 2.5 MG/3ML
2.5 SOLUTION RESPIRATORY (INHALATION) ONCE AS NEEDED
Status: COMPLETED | OUTPATIENT
Start: 2023-07-08 | End: 2023-07-08

## 2023-07-08 RX ORDER — ALPRAZOLAM 0.5 MG/1
0.5 TABLET ORAL EVERY 6 HOURS PRN
Status: DISCONTINUED | OUTPATIENT
Start: 2023-07-08 | End: 2023-07-10 | Stop reason: HOSPADM

## 2023-07-08 RX ORDER — ONDANSETRON 2 MG/ML
INJECTION INTRAMUSCULAR; INTRAVENOUS AS NEEDED
Status: DISCONTINUED | OUTPATIENT
Start: 2023-07-08 | End: 2023-07-08

## 2023-07-08 RX ORDER — SODIUM CHLORIDE, SODIUM LACTATE, POTASSIUM CHLORIDE, CALCIUM CHLORIDE 600; 310; 30; 20 MG/100ML; MG/100ML; MG/100ML; MG/100ML
125 INJECTION, SOLUTION INTRAVENOUS CONTINUOUS
Status: DISCONTINUED | OUTPATIENT
Start: 2023-07-08 | End: 2023-07-08

## 2023-07-08 RX ORDER — MAGNESIUM HYDROXIDE 1200 MG/15ML
LIQUID ORAL AS NEEDED
Status: DISCONTINUED | OUTPATIENT
Start: 2023-07-08 | End: 2023-07-08 | Stop reason: HOSPADM

## 2023-07-08 RX ORDER — SODIUM CHLORIDE, SODIUM LACTATE, POTASSIUM CHLORIDE, CALCIUM CHLORIDE 600; 310; 30; 20 MG/100ML; MG/100ML; MG/100ML; MG/100ML
INJECTION, SOLUTION INTRAVENOUS CONTINUOUS PRN
Status: DISCONTINUED | OUTPATIENT
Start: 2023-07-08 | End: 2023-07-08

## 2023-07-08 RX ORDER — PROPOFOL 10 MG/ML
INJECTION, EMULSION INTRAVENOUS AS NEEDED
Status: DISCONTINUED | OUTPATIENT
Start: 2023-07-08 | End: 2023-07-08

## 2023-07-08 RX ORDER — ROCURONIUM BROMIDE 10 MG/ML
INJECTION, SOLUTION INTRAVENOUS AS NEEDED
Status: DISCONTINUED | OUTPATIENT
Start: 2023-07-08 | End: 2023-07-08

## 2023-07-08 RX ORDER — HYDROMORPHONE HCL IN WATER/PF 6 MG/30 ML
0.2 PATIENT CONTROLLED ANALGESIA SYRINGE INTRAVENOUS EVERY 2 HOUR PRN
Status: DISCONTINUED | OUTPATIENT
Start: 2023-07-08 | End: 2023-07-10

## 2023-07-08 RX ORDER — LIDOCAINE HYDROCHLORIDE 10 MG/ML
INJECTION, SOLUTION EPIDURAL; INFILTRATION; INTRACAUDAL; PERINEURAL AS NEEDED
Status: DISCONTINUED | OUTPATIENT
Start: 2023-07-08 | End: 2023-07-08

## 2023-07-08 RX ORDER — ALBUTEROL SULFATE 90 UG/1
AEROSOL, METERED RESPIRATORY (INHALATION) AS NEEDED
Status: DISCONTINUED | OUTPATIENT
Start: 2023-07-08 | End: 2023-07-08

## 2023-07-08 RX ORDER — FENTANYL CITRATE 50 UG/ML
INJECTION, SOLUTION INTRAMUSCULAR; INTRAVENOUS AS NEEDED
Status: DISCONTINUED | OUTPATIENT
Start: 2023-07-08 | End: 2023-07-08

## 2023-07-08 RX ORDER — ACETAMINOPHEN 325 MG/1
650 TABLET ORAL EVERY 6 HOURS PRN
Status: DISCONTINUED | OUTPATIENT
Start: 2023-07-08 | End: 2023-07-10 | Stop reason: HOSPADM

## 2023-07-08 RX ORDER — FENTANYL CITRATE/PF 50 MCG/ML
50 SYRINGE (ML) INJECTION
Status: DISCONTINUED | OUTPATIENT
Start: 2023-07-08 | End: 2023-07-08 | Stop reason: HOSPADM

## 2023-07-08 RX ORDER — ONDANSETRON 2 MG/ML
4 INJECTION INTRAMUSCULAR; INTRAVENOUS EVERY 6 HOURS PRN
Status: DISCONTINUED | OUTPATIENT
Start: 2023-07-08 | End: 2023-07-10 | Stop reason: HOSPADM

## 2023-07-08 RX ORDER — METOCLOPRAMIDE HYDROCHLORIDE 5 MG/ML
10 INJECTION INTRAMUSCULAR; INTRAVENOUS ONCE AS NEEDED
Status: DISCONTINUED | OUTPATIENT
Start: 2023-07-08 | End: 2023-07-08 | Stop reason: HOSPADM

## 2023-07-08 RX ORDER — ONDANSETRON 2 MG/ML
4 INJECTION INTRAMUSCULAR; INTRAVENOUS ONCE AS NEEDED
Status: DISCONTINUED | OUTPATIENT
Start: 2023-07-08 | End: 2023-07-08 | Stop reason: HOSPADM

## 2023-07-08 RX ORDER — DEXAMETHASONE SODIUM PHOSPHATE 10 MG/ML
INJECTION, SOLUTION INTRAMUSCULAR; INTRAVENOUS AS NEEDED
Status: DISCONTINUED | OUTPATIENT
Start: 2023-07-08 | End: 2023-07-08

## 2023-07-08 RX ORDER — AMOXICILLIN AND CLAVULANATE POTASSIUM 875; 125 MG/1; MG/1
1 TABLET, FILM COATED ORAL EVERY 12 HOURS SCHEDULED
Qty: 10 TABLET | Refills: 0 | Status: SHIPPED | OUTPATIENT
Start: 2023-07-08 | End: 2023-07-09

## 2023-07-08 RX ORDER — SODIUM CHLORIDE 9 MG/ML
INJECTION, SOLUTION INTRAVENOUS AS NEEDED
Status: DISCONTINUED | OUTPATIENT
Start: 2023-07-08 | End: 2023-07-08 | Stop reason: HOSPADM

## 2023-07-08 RX ORDER — INSULIN LISPRO 100 [IU]/ML
2-12 INJECTION, SOLUTION INTRAVENOUS; SUBCUTANEOUS EVERY 6 HOURS SCHEDULED
Status: DISCONTINUED | OUTPATIENT
Start: 2023-07-08 | End: 2023-07-09

## 2023-07-08 RX ORDER — SODIUM CHLORIDE, SODIUM LACTATE, POTASSIUM CHLORIDE, CALCIUM CHLORIDE 600; 310; 30; 20 MG/100ML; MG/100ML; MG/100ML; MG/100ML
100 INJECTION, SOLUTION INTRAVENOUS CONTINUOUS
Status: DISCONTINUED | OUTPATIENT
Start: 2023-07-08 | End: 2023-07-09

## 2023-07-08 RX ORDER — CEFAZOLIN SODIUM 2 G/50ML
2000 SOLUTION INTRAVENOUS
Status: COMPLETED | OUTPATIENT
Start: 2023-07-09 | End: 2023-07-09

## 2023-07-08 RX ORDER — MIDAZOLAM HYDROCHLORIDE 2 MG/2ML
INJECTION, SOLUTION INTRAMUSCULAR; INTRAVENOUS AS NEEDED
Status: DISCONTINUED | OUTPATIENT
Start: 2023-07-08 | End: 2023-07-08

## 2023-07-08 RX ORDER — ALBUMIN, HUMAN INJ 5% 5 %
SOLUTION INTRAVENOUS CONTINUOUS PRN
Status: DISCONTINUED | OUTPATIENT
Start: 2023-07-08 | End: 2023-07-08

## 2023-07-08 RX ORDER — ENOXAPARIN SODIUM 100 MG/ML
40 INJECTION SUBCUTANEOUS DAILY
Status: DISCONTINUED | OUTPATIENT
Start: 2023-07-09 | End: 2023-07-10 | Stop reason: HOSPADM

## 2023-07-08 RX ORDER — HYDROMORPHONE HCL IN WATER/PF 6 MG/30 ML
0.2 PATIENT CONTROLLED ANALGESIA SYRINGE INTRAVENOUS EVERY 4 HOURS PRN
Status: DISCONTINUED | OUTPATIENT
Start: 2023-07-08 | End: 2023-07-10

## 2023-07-08 RX ADMIN — SODIUM CHLORIDE, SODIUM LACTATE, POTASSIUM CHLORIDE, AND CALCIUM CHLORIDE 100 ML/HR: .6; .31; .03; .02 INJECTION, SOLUTION INTRAVENOUS at 21:01

## 2023-07-08 RX ADMIN — IPRATROPIUM BROMIDE 0.5 MG: 0.5 SOLUTION RESPIRATORY (INHALATION) at 20:29

## 2023-07-08 RX ADMIN — ALBUTEROL SULFATE 2.5 MG: 2.5 SOLUTION RESPIRATORY (INHALATION) at 20:29

## 2023-07-08 RX ADMIN — ALBUTEROL SULFATE 6 PUFF: 90 AEROSOL, METERED RESPIRATORY (INHALATION) at 19:16

## 2023-07-08 RX ADMIN — MIDAZOLAM HYDROCHLORIDE 1 MG: 1 INJECTION, SOLUTION INTRAMUSCULAR; INTRAVENOUS at 19:01

## 2023-07-08 RX ADMIN — ONDANSETRON 4 MG: 2 INJECTION INTRAMUSCULAR; INTRAVENOUS at 19:17

## 2023-07-08 RX ADMIN — LIDOCAINE HYDROCHLORIDE 50 MG: 10 INJECTION, SOLUTION EPIDURAL; INFILTRATION; INTRACAUDAL; PERINEURAL at 19:13

## 2023-07-08 RX ADMIN — SODIUM CHLORIDE 1000 ML: 0.9 INJECTION, SOLUTION INTRAVENOUS at 15:46

## 2023-07-08 RX ADMIN — Medication 100 MG: at 19:13

## 2023-07-08 RX ADMIN — ALBUMIN (HUMAN): 12.5 INJECTION, SOLUTION INTRAVENOUS at 19:06

## 2023-07-08 RX ADMIN — FENTANYL CITRATE 50 MCG: 50 INJECTION, SOLUTION INTRAMUSCULAR; INTRAVENOUS at 19:13

## 2023-07-08 RX ADMIN — ROCURONIUM BROMIDE 50 MG: 10 INJECTION, SOLUTION INTRAVENOUS at 19:19

## 2023-07-08 RX ADMIN — IOHEXOL 100 ML: 350 INJECTION, SOLUTION INTRAVENOUS at 16:12

## 2023-07-08 RX ADMIN — PROPOFOL 200 MG: 10 INJECTION, EMULSION INTRAVENOUS at 19:13

## 2023-07-08 RX ADMIN — METRONIDAZOLE: 500 INJECTION, SOLUTION INTRAVENOUS at 19:28

## 2023-07-08 RX ADMIN — ALBUTEROL SULFATE 6 PUFF: 90 AEROSOL, METERED RESPIRATORY (INHALATION) at 19:49

## 2023-07-08 RX ADMIN — DEXAMETHASONE SODIUM PHOSPHATE 10 MG: 10 INJECTION, SOLUTION INTRAMUSCULAR; INTRAVENOUS at 19:17

## 2023-07-08 RX ADMIN — CEFTRIAXONE SODIUM 1000 MG: 10 INJECTION, POWDER, FOR SOLUTION INTRAVENOUS at 18:15

## 2023-07-08 RX ADMIN — FENTANYL CITRATE 50 MCG: 50 INJECTION, SOLUTION INTRAMUSCULAR; INTRAVENOUS at 20:02

## 2023-07-08 RX ADMIN — CEFAZOLIN SODIUM 2000 MG: 2 SOLUTION INTRAVENOUS at 19:16

## 2023-07-08 RX ADMIN — SUGAMMADEX 400 MG: 100 INJECTION, SOLUTION INTRAVENOUS at 19:56

## 2023-07-08 RX ADMIN — SODIUM CHLORIDE, SODIUM LACTATE, POTASSIUM CHLORIDE, AND CALCIUM CHLORIDE: .6; .31; .03; .02 INJECTION, SOLUTION INTRAVENOUS at 19:09

## 2023-07-08 NOTE — ANESTHESIA PREPROCEDURE EVALUATION
Procedure:  APPENDECTOMY LAPAROSCOPIC (Abdomen)    Relevant Problems   ANESTHESIA (within normal limits)      CARDIO   (+) Essential hypertension, benign   (+) Hypertension   (+) Mixed hyperlipidemia      ENDO   (+) Diabetes mellitus, type 2 (HCC)      GI/HEPATIC   (+) GI bleed      NEURO/PSYCH   (+) Anxiety disorder   (+) Depression, recurrent (HCC)   (+) Dysthymic disorder      PULMONARY  (+) Atalectasis on CT scan, productive cough        Physical Exam    Airway    Mallampati score: III  TM Distance: >3 FB  Neck ROM: full     Dental   Comment: Partial upper denture,     Cardiovascular      Pulmonary      Other Findings        Anesthesia Plan  ASA Score- 2     Anesthesia Type- general with ASA Monitors. Additional Monitors:   Airway Plan: ETT. Plan Factors-Exercise tolerance (METS): >4 METS. Chart reviewed. EKG reviewed. Imaging results reviewed. Existing labs reviewed. Patient summary reviewed. Patient is a current smoker. Induction- intravenous. Postoperative Plan- Plan for postoperative opioid use. Informed Consent- Anesthetic plan and risks discussed with patient. I personally reviewed this patient with the CRNA. Discussed and agreed on the Anesthesia Plan with the CRNA. Mono Conley Patient reports he has cut back on his smoking by 50% over the last few months in an attempt to quit smoking.     He reports productive cough

## 2023-07-08 NOTE — H&P
Acute Care Surgery  History and Physical  Dominik Reed 76 y.o. male MRN: [id number]  Unit/Bed#: ED-08 Encounter: [id number]    Assessment and Plan:  77 yo M with acute appendicitis  AVSS, WBC 15    - admit to surgery  - OR for lap appy  - IV abx  - NPO/IVF  - prn pain, antiemetic regimen  - dvt ppx  - SSI    Discussed with Dr. Arabella Madrid. Discussed risks/benefits of surgery with pt, amenable to proceed      History of Present Illness   History, ROS and PFSH unobtainable from any source due to none. HPI:  Katelyn Ferrera is a 76 y.o. male PMH HTN, DM, anxiety who presents with abdominal pain x2-3 days. Pt states RLQ pain began 2-3 days ago, associated with Tmax 100, chills. Denies any nausea, vomiting, cp, sob, changes to bowel or bladder habits. No prior abdominal sx in the past. Denies AC use. Prior smoker but has since quit. Last ate at 9:30 am.    Review of Systems   Constitutional: Positive for chills. Negative for appetite change and fever. Respiratory: Negative. Cardiovascular: Negative. Gastrointestinal: Positive for abdominal pain. Negative for diarrhea, nausea and vomiting. Genitourinary: Negative. Musculoskeletal: Negative. Skin: Negative. Neurological: Negative. All other systems reviewed and are negative.       Historical Information   Past Medical History:   Diagnosis Date   • Acquired hypothyroidism    • Anxiety    • Anxiety disorder    • Cigarette smoker    • Colon cancer screening declined     does understand the risk of missing colon cancer - As per [employer]   • Diabetes mellitus ([address])    • Diabetes mellitus, type 2 ([address])    • Dyslipidemia    • Essential hypertension, benign    • Hyperlipidemia    • Hypertension    • Kidney stone    • Type II diabetes mellitus, uncontrolled      Past Surgical History:   Procedure Laterality Date   • CARDIAC CATHETERIZATION      1/19/09 no obstructive- Dr. Miner   • [facility]      ? 2006 s/p stone removal       Social History   Social History Substance and Sexual Activity   Alcohol Use Not Currently   • Alcohol/week: 1.0 standard drink of alcohol   • Types: 1 Glasses of wine per week     Social History     Substance and Sexual Activity   Drug Use Yes   • Types: Marijuana     Social History     Tobacco Use   Smoking Status Every Day   • Packs/day: 0.50   • Years: 5.00   • Total pack years: 2.50   • Types: Cigarettes   • Last attempt to quit: 2018   • Years since quittin.8   Smokeless Tobacco Never     Family History:   Family History   Problem Relation Age of Onset   • Heart disease Father    • Heart attack Father        Meds/Allergies   PTA meds:   Prior to Admission Medications   Prescriptions Last Dose Informant Patient Reported? Taking?    ALPRAZolam (XANAX) 0.5 mg tablet  Self No No   Sig: Take 1 tablet (0.5 mg total) by mouth every 6 (six) hours as needed for anxiety   Blood Glucose Monitoring Suppl (ONE TOUCH ULTRA 2) w/Device KIT  Self No No   Sig: CONTOUR METER, USE 3 TIMES A DAY E11.65   Lancets MISC  Self No No   Sig: Use 3 (three) times a day Contour lancets E11.65   amoxicillin (AMOXIL) 500 mg capsule  Self Yes No   Sig: TAKE 1 CAPSULE BY MOUTH THREE TIMES A DAY UNTIL ALL GONE   dulaglutide (Trulicity) 1.5 BZ/3.9WA injection  Self No No   Sig: Inject 0.5 mL (1.5 mg total) under the skin once a week   glucose blood (OneTouch Ultra) test strip  Self No No   Sig: Use as instructed   lisinopril (ZESTRIL) 5 mg tablet  Self No No   Sig: Take 1 tablet (5 mg total) by mouth daily   metFORMIN (GLUCOPHAGE) 1000 MG tablet  Self No No   Sig: TAKE 1 TABLET BY MOUTH TWICE A DAY WITH MEALS   Patient taking differently: 1/2 in the AM and 1/2 in the PM   patient supplied medication  Self Yes No   Sig: Multi-Betic vitamin     simvastatin (ZOCOR) 20 mg tablet  Self No No   Sig: Take 1 tablet (20 mg total) by mouth daily at bedtime      Facility-Administered Medications: None     No Known Allergies    Objective   First Vitals:   Blood Pressure: 148/70 (07/08/23 1413)  Pulse: 95 (07/08/23 1413)  Temperature: 99 °F (37.2 °C) (07/08/23 1413)  Temp Source: Oral (07/08/23 1413)  Respirations: 16 (07/08/23 1413)  Weight - Scale: 113 kg (248 lb 7.3 oz) (07/08/23 1413)  SpO2: 90 % (07/08/23 1413)    Current Vitals:   Blood Pressure: 148/70 (07/08/23 1413)  Pulse: 95 (07/08/23 1413)  Temperature: 99 °F (37.2 °C) (07/08/23 1413)  Temp Source: Oral (07/08/23 1413)  Respirations: 16 (07/08/23 1413)  Weight - Scale: 113 kg (248 lb 7.3 oz) (07/08/23 1413)  SpO2: 90 % (07/08/23 1413)    No intake or output data in the 24 hours ending 07/08/23 1833    Invasive Devices     Peripheral Intravenous Line  Duration           Peripheral IV 07/08/23 Distal;Right;Upper;Ventral (anterior) Arm <1 day                Physical Exam  Vitals and nursing note reviewed. Constitutional:       General: He is not in acute distress. Appearance: Normal appearance. He is normal weight. He is not ill-appearing, toxic-appearing or diaphoretic. HENT:      Head: Normocephalic and atraumatic. Eyes:      Extraocular Movements: Extraocular movements intact. Cardiovascular:      Rate and Rhythm: Tachycardia present. Pulmonary:      Effort: Pulmonary effort is normal. No respiratory distress. Abdominal:      General: Abdomen is flat. There is no distension. Palpations: Abdomen is soft. Tenderness: There is abdominal tenderness. There is guarding. There is no rebound. Comments: Soft, voluntary guarding on palpation of RLQ   Musculoskeletal:         General: No swelling or tenderness. Skin:     General: Skin is warm. Capillary Refill: Capillary refill takes less than 2 seconds. Neurological:      General: No focal deficit present. Mental Status: He is alert and oriented to person, place, and time. Psychiatric:         Mood and Affect: Mood normal.         Behavior: Behavior normal.         Lab Results:   I have personally reviewed pertinent lab results.   , CBC:   Lab Results   Component Value Date    WBC 15.63 (H) 07/08/2023    HGB 16.5 07/08/2023    HCT 49.8 (H) 07/08/2023    MCV 99 (H) 07/08/2023     07/08/2023    RBC 5.02 07/08/2023    MCH 32.9 07/08/2023    MCHC 33.1 07/08/2023    RDW 12.7 07/08/2023    MPV 10.1 07/08/2023    NRBC 0 07/08/2023   , CMP:   Lab Results   Component Value Date    SODIUM 138 07/08/2023    K 4.4 07/08/2023     07/08/2023    CO2 29 07/08/2023    BUN 20 07/08/2023    CREATININE 0.90 07/08/2023    CALCIUM 9.6 07/08/2023    AST 17 07/08/2023    ALT 22 07/08/2023    ALKPHOS 37 07/08/2023    EGFR 87 07/08/2023   , Coagulation: No results found for: "PT", "INR", "APTT", Amylase: No results found for: "AMYLASE"  Imaging: I have personally reviewed pertinent reports. 7/8/23 CTAP: IMPRESSION:  Acute uncomplicated appendicitis. EKG, Pathology, and Other Studies: I have personally reviewed pertinent reports. Code Status: Level 1 - Full Code  Advance Directive and Living Will:      Power of :    POLST:      Counseling / Coordination of Care  Total floor / unit time spent today 30 minutes. This involved direct patient contact where I performed a full history and physical, reviewed previous records, and reviewed laboratory and other diagnostic studies. Greater than 50% of total time was spent with the patient and / or family counseling and / or coordination of care.     Nevin Bonilla PA-C  7/8/2023

## 2023-07-08 NOTE — ED PROVIDER NOTES
History  Chief Complaint   Patient presents with   • Abdominal Pain     Pt presents to ED for RLQ abdominal pain. States this pain feels similar to his diverticulitis flare up. Denies N/V/D     The patient is a 41-year-old male with a past medical history of diverticulitis, diabetes and hypertension who presents to the emergency department with right lower quadrant abdominal pain. The patient describes his pain as sharp and states that it has been ongoing for the past couple of days. He rates his pain a 4 out of 10 and also says that he has been experiencing elevated temperatures of 99 °F.  He states the pain is similar to when he was diagnosed with diverticulitis but is located in a different location he denies nausea, vomiting, diarrhea, chest pain, shortness of breath, dysuria, hematuria, rash or fever. Prior to Admission Medications   Prescriptions Last Dose Informant Patient Reported? Taking?    ALPRAZolam (XANAX) 0.5 mg tablet  Self No No   Sig: Take 1 tablet (0.5 mg total) by mouth every 6 (six) hours as needed for anxiety   Blood Glucose Monitoring Suppl (ONE TOUCH ULTRA 2) w/Device KIT  Self No No   Sig: CONTOUR METER, USE 3 TIMES A DAY E11.65   Lancets MISC  Self No No   Sig: Use 3 (three) times a day Contour lancets E11.65   amoxicillin (AMOXIL) 500 mg capsule  Self Yes No   Sig: TAKE 1 CAPSULE BY MOUTH THREE TIMES A DAY UNTIL ALL GONE   dulaglutide (Trulicity) 1.5 LA/5.3BD injection  Self No No   Sig: Inject 0.5 mL (1.5 mg total) under the skin once a week   glucose blood (OneTouch Ultra) test strip  Self No No   Sig: Use as instructed   lisinopril (ZESTRIL) 5 mg tablet  Self No No   Sig: Take 1 tablet (5 mg total) by mouth daily   metFORMIN (GLUCOPHAGE) 1000 MG tablet  Self No No   Sig: TAKE 1 TABLET BY MOUTH TWICE A DAY WITH MEALS   Patient taking differently: 1/2 in the AM and 1/2 in the PM   patient supplied medication  Self Yes No   Sig: Multi-Betic vitamin     simvastatin (ZOCOR) 20 mg tablet  Self No No   Sig: Take 1 tablet (20 mg total) by mouth daily at bedtime      Facility-Administered Medications: None       Past Medical History:   Diagnosis Date   • Acquired hypothyroidism    • Anxiety    • Anxiety disorder    • Cigarette smoker    • Colon cancer screening declined     does understand the risk of missing colon cancer - As per [employer]   • Diabetes mellitus ([address])    • Diabetes mellitus, type 2 ([address])    • Dyslipidemia    • Essential hypertension, benign    • Hyperlipidemia    • Hypertension    • Kidney stone    • Type II diabetes mellitus, uncontrolled        Past Surgical History:   Procedure Laterality Date   • CARDIAC CATHETERIZATION      09 no obstructive- Dr. Misha Nice   • [address]      ?  s/p stone removal         Family History   Problem Relation Age of Onset   • Heart disease Father    • Heart attack Father      I have reviewed and agree with the history as documented. E-Cigarette/Vaping   • E-Cigarette Use Never User      E-Cigarette/Vaping Substances   • Nicotine No    • THC No    • CBD No    • Flavoring No    • Other No    • Unknown No      Social History     Tobacco Use   • Smoking status: Every Day     Packs/day: 0.50     Years: 5.00     Total pack years: 2.50     Types: Cigarettes     Last attempt to quit: 2018     Years since quittin.8   • Smokeless tobacco: Never   Vaping Use   • Vaping Use: Never used   Substance Use Topics   • Alcohol use: Not Currently     Alcohol/week: 1.0 standard drink of alcohol     Types: 1 Glasses of wine per week   • Drug use: Yes     Types: Marijuana        Review of Systems   Constitutional: Negative for chills, fatigue and fever. HENT: Negative for congestion, ear pain and sore throat. Eyes: Negative for photophobia, pain and visual disturbance. Respiratory: Negative for cough, shortness of breath, wheezing and stridor. Cardiovascular: Negative for chest pain, palpitations and leg swelling. Gastrointestinal: Positive for abdominal pain. Negative for abdominal distention, constipation, diarrhea, nausea and vomiting. Endocrine: Negative. Genitourinary: Negative for dysuria and hematuria. Musculoskeletal: Negative for arthralgias, back pain, neck pain and neck stiffness. Skin: Negative for color change and rash. Allergic/Immunologic: Negative. Neurological: Negative for dizziness, seizures, syncope, weakness, light-headedness, numbness and headaches. Hematological: Negative. Psychiatric/Behavioral: Negative. All other systems reviewed and are negative. Physical Exam  ED Triage Vitals [07/08/23 1413]   Temperature Pulse Respirations Blood Pressure SpO2   99 °F (37.2 °C) 95 16 148/70 90 %      Temp Source Heart Rate Source Patient Position - Orthostatic VS BP Location FiO2 (%)   Oral Monitor -- Right arm --      Pain Score       --             Orthostatic Vital Signs  Vitals:    07/08/23 1413   BP: 148/70   Pulse: 95       Physical Exam  Vitals and nursing note reviewed. Constitutional:       General: He is not in acute distress. Appearance: He is well-developed and normal weight. He is not ill-appearing. HENT:      Head: Normocephalic and atraumatic. Mouth/Throat:      Mouth: Mucous membranes are moist.      Pharynx: Oropharynx is clear. Eyes:      Extraocular Movements: Extraocular movements intact. Conjunctiva/sclera: Conjunctivae normal.      Pupils: Pupils are equal, round, and reactive to light. Cardiovascular:      Rate and Rhythm: Normal rate and regular rhythm. Heart sounds: Normal heart sounds. No murmur heard. No friction rub. Pulmonary:      Effort: Pulmonary effort is normal. No respiratory distress. Breath sounds: Normal breath sounds. No stridor. No wheezing, rhonchi or rales. Abdominal:      General: Abdomen is flat. Bowel sounds are normal. There is no distension. Palpations: Abdomen is soft.  There is no shifting dullness, hepatomegaly, splenomegaly, mass or pulsatile mass. Tenderness: There is abdominal tenderness. There is no right CVA tenderness, left CVA tenderness, guarding or rebound. Positive signs include McBurney's sign. Negative signs include Campos's sign, Rovsing's sign and obturator sign. Hernia: No hernia is present. Musculoskeletal:         General: No swelling. Cervical back: Neck supple. Skin:     General: Skin is warm and dry. Capillary Refill: Capillary refill takes less than 2 seconds. Coloration: Skin is not jaundiced. Findings: No erythema or rash. Neurological:      General: No focal deficit present. Mental Status: He is alert and oriented to person, place, and time. Cranial Nerves: No cranial nerve deficit. Motor: No weakness.    Psychiatric:         Mood and Affect: Mood normal.         ED Medications  Medications   metroNIDAZOLE (FLAGYL) IVPB (premix) 500 mg 100 mL (has no administration in time range)   ceftriaxone (ROCEPHIN) 1 g/50 mL in dextrose IVPB (1,000 mg Intravenous New Bag 7/8/23 1815)   lactated ringers infusion (has no administration in time range)   enoxaparin (LOVENOX) subcutaneous injection 40 mg (has no administration in time range)   ondansetron (ZOFRAN) injection 4 mg (has no administration in time range)   ceFAZolin (ANCEF) IVPB (premix in dextrose) 2,000 mg 50 mL (has no administration in time range)   HYDROmorphone (DILAUDID) injection 0.5 mg (has no administration in time range)   HYDROmorphone HCl (DILAUDID) injection 0.2 mg (has no administration in time range)   HYDROmorphone HCl (DILAUDID) injection 0.2 mg (has no administration in time range)   acetaminophen (TYLENOL) tablet 650 mg (has no administration in time range)   ALPRAZolam (XANAX) tablet 0.5 mg (has no administration in time range)   labetalol (NORMODYNE) injection 10 mg (has no administration in time range)   insulin lispro (HumaLOG) 100 units/mL subcutaneous injection 2-12 Units (has no administration in time range)   sodium chloride 0.9 % bolus 1,000 mL (0 mL Intravenous Stopped 7/8/23 1757)   iohexol (OMNIPAQUE) 350 MG/ML injection (SINGLE-DOSE) 100 mL (100 mL Intravenous Given 7/8/23 1612)       Diagnostic Studies  Results Reviewed     Procedure Component Value Units Date/Time    UA w Reflex to Microscopic w Reflex to Culture [[id number]] Collected: 07/08/23 1757    Lab Status:  In process Specimen: Urine, Clean Catch Updated: 07/08/23 1800    Lipase [[id number]]  (Normal) Collected: 07/08/23 1442    Lab Status: Final result Specimen: Blood from Arm, Right Updated: 07/08/23 1501     Lipase 23 u/L     Comprehensive metabolic panel [[id number]] Collected: 07/08/23 1442    Lab Status: Final result Specimen: Blood from Arm, Right Updated: 07/08/23 1501     Sodium 138 mmol/L      Potassium 4.4 mmol/L      Chloride 103 mmol/L      CO2 29 mmol/L      ANION GAP 6 mmol/L      BUN 20 mg/dL      Creatinine 0.90 mg/dL      Glucose 125 mg/dL      Calcium 9.6 mg/dL      AST 17 U/L      ALT 22 U/L      Alkaline Phosphatase 37 U/L      Total Protein 6.6 g/dL      Albumin 4.4 g/dL      Total Bilirubin 0.78 mg/dL      eGFR 87 ml/min/1.73sq m     Narrative:      [facility] guidelines for Chronic Kidney Disease (CKD):   •  Stage 1 with normal or high GFR (GFR > 90 mL/min/1.73 square meters)  •  Stage 2 Mild CKD (GFR = 60-89 mL/min/1.73 square meters)  •  Stage 3A Moderate CKD (GFR = 45-59 mL/min/1.73 square meters)  •  Stage 3B Moderate CKD (GFR = 30-44 mL/min/1.73 square meters)  •  Stage 4 Severe CKD (GFR = 15-29 mL/min/1.73 square meters)  •  Stage 5 End Stage CKD (GFR <15 mL/min/1.73 square meters)  Note: GFR calculation is accurate only with a steady state creatinine    CBC and differential [[id number]]  (Abnormal) Collected: 07/08/23 1442    Lab Status: Final result Specimen: Blood from Arm, Right Updated: 07/08/23 1451     WBC 15.63 Thousand/uL      RBC 5.02 Million/uL      Hemoglobin 16.5 g/dL      Hematocrit 49.8 %      MCV 99 fL      MCH 32.9 pg      MCHC 33.1 g/dL      RDW 12.7 %      MPV 10.1 fL      Platelets 487 Thousands/uL      nRBC 0 /100 WBCs      Neutrophils Relative 74 %      Immat GRANS % 1 %      Lymphocytes Relative 13 %      Monocytes Relative 11 %      Eosinophils Relative 1 %      Basophils Relative 0 %      Neutrophils Absolute 11.60 Thousands/µL      Immature Grans Absolute 0.08 Thousand/uL      Lymphocytes Absolute 2.08 Thousands/µL      Monocytes Absolute 1.67 Thousand/µL      Eosinophils Absolute 0.14 Thousand/µL      Basophils Absolute 0.06 Thousands/µL                  CT abdomen pelvis with contrast   Final Result by Rafal Ca MD (07/08 1752)      Acute uncomplicated appendicitis. Workstation performed: [id number]               Procedures  Procedures      ED Course  ED Course as of 07/08/23 1828   Sat Jul 08, 2023   1520 WBC(!): 15.63  Leukocytosis noted. 1520 Lipase: 23  Doubt pancreatitis   1520 Comprehensive metabolic panel  Within normal limits   1756 CT abdomen pelvis with contrast  IMPRESSION:     Acute uncomplicated appendicitis. 1756 I will consult general surgery at this time. [address] [place] [address] Dr. Cora Romero informed me the patient will be admitted under the care of Dr. Kay Turpin and taken to the OR at this time. SBIRT 22yo+    Flowsheet Row Most Recent Value   Initial Alcohol Screen: US AUDIT-C     1. How often do you have a drink containing alcohol? 0 Filed at: 07/08/2023 1416   2. How many drinks containing alcohol do you have on a typical day you are drinking? 0 Filed at: 07/08/2023 1416   3a. Male UNDER 65: How often do you have five or more drinks on one occasion? 0 Filed at: 07/08/2023 1416   3b. FEMALE Any Age, or MALE 65+: How often do you have 4 or more drinks on one occassion?  0 Filed at: 07/08/2023 1416   Audit-C Score 0 Filed at: 07/08/2023 1416   TIMUR: How many times in the past year have you. .. Used an illegal drug or used a prescription medication for non-medical reasons? Never Filed at: 07/08/2023 1416                Medical Decision Making  The patient is a 71-year-old male with a past medical history of diverticulitis, diabetes and hypertension who presents to the emergency department with right lower quadrant abdominal pain. Upon initial presentation patient was alert and oriented x4 and did not mild distress secondary to right lower quadrant abdominal pain. Upon physical exam he had a positive McBurney sign but the remainder of his exam was grossly unremarkable. The differential includes but is not limited to appendicitis, colitis, muscular strain or renal stone. I have ordered a CBC, CMP, lipase and a CT of his abdomen pelvis with contrast.  The patient stated that he did not want pain medication at this time. Results pending. Amount and/or Complexity of Data Reviewed  Labs: ordered. Decision-making details documented in ED Course. Radiology: ordered. Decision-making details documented in ED Course. Risk  Prescription drug management. Disposition  Final diagnoses:   Acute appendicitis with localized peritonitis, without perforation, abscess, or gangrene     Time reflects when diagnosis was documented in both MDM as applicable and the Disposition within this note     Time User Action Codes Description Comment    7/8/2023  5:58 PM Terry Garber [K35.30] Acute appendicitis with localized peritonitis, without perforation, abscess, or gangrene       ED Disposition     ED Disposition   Send to OR    Condition   --    Date/Time   Sat Jul 8, 2023  6:28 PM    Comment   --         Follow-up Information    None         Patient's Medications   Discharge Prescriptions    No medications on file     No discharge procedures on file.     PDMP Review       Value Time User    PDMP Reviewed  Yes 12/29/2022  9:50 AM Sobeida Baldwin MD           ED Provider  Attending physically available and evaluated Dominik Reed. I managed the patient along with the ED Attending.     Electronically Signed by         Ashley Fong DO  07/08/23 9934

## 2023-07-09 LAB
ANION GAP SERPL CALCULATED.3IONS-SCNC: 6 MMOL/L
BASOPHILS # BLD AUTO: 0.03 THOUSANDS/ÂΜL (ref 0–0.1)
BASOPHILS NFR BLD AUTO: 0 % (ref 0–1)
BUN SERPL-MCNC: 18 MG/DL (ref 5–25)
CALCIUM SERPL-MCNC: 8.5 MG/DL (ref 8.4–10.2)
CHLORIDE SERPL-SCNC: 105 MMOL/L (ref 96–108)
CO2 SERPL-SCNC: 25 MMOL/L (ref 21–32)
CREAT SERPL-MCNC: 0.84 MG/DL (ref 0.6–1.3)
EOSINOPHIL # BLD AUTO: 0 THOUSAND/ÂΜL (ref 0–0.61)
EOSINOPHIL NFR BLD AUTO: 0 % (ref 0–6)
ERYTHROCYTE [DISTWIDTH] IN BLOOD BY AUTOMATED COUNT: 12.6 % (ref 11.6–15.1)
GFR SERPL CREATININE-BSD FRML MDRD: 89 ML/MIN/1.73SQ M
GLUCOSE SERPL-MCNC: 121 MG/DL (ref 65–140)
GLUCOSE SERPL-MCNC: 142 MG/DL (ref 65–140)
GLUCOSE SERPL-MCNC: 182 MG/DL (ref 65–140)
GLUCOSE SERPL-MCNC: 193 MG/DL (ref 65–140)
GLUCOSE SERPL-MCNC: 213 MG/DL (ref 65–140)
GLUCOSE SERPL-MCNC: 216 MG/DL (ref 65–140)
HCT VFR BLD AUTO: 43.7 % (ref 36.5–49.3)
HGB BLD-MCNC: 14.3 G/DL (ref 12–17)
IMM GRANULOCYTES # BLD AUTO: 0.07 THOUSAND/UL (ref 0–0.2)
IMM GRANULOCYTES NFR BLD AUTO: 1 % (ref 0–2)
LYMPHOCYTES # BLD AUTO: 1.53 THOUSANDS/ÂΜL (ref 0.6–4.47)
LYMPHOCYTES NFR BLD AUTO: 11 % (ref 14–44)
MCH RBC QN AUTO: 32.9 PG (ref 26.8–34.3)
MCHC RBC AUTO-ENTMCNC: 32.7 G/DL (ref 31.4–37.4)
MCV RBC AUTO: 101 FL (ref 82–98)
MONOCYTES # BLD AUTO: 0.6 THOUSAND/ÂΜL (ref 0.17–1.22)
MONOCYTES NFR BLD AUTO: 4 % (ref 4–12)
NEUTROPHILS # BLD AUTO: 12.16 THOUSANDS/ÂΜL (ref 1.85–7.62)
NEUTS SEG NFR BLD AUTO: 84 % (ref 43–75)
NRBC BLD AUTO-RTO: 0 /100 WBCS
PLATELET # BLD AUTO: 201 THOUSANDS/UL (ref 149–390)
PMV BLD AUTO: 10.1 FL (ref 8.9–12.7)
POTASSIUM SERPL-SCNC: 4.4 MMOL/L (ref 3.5–5.3)
RBC # BLD AUTO: 4.34 MILLION/UL (ref 3.88–5.62)
SODIUM SERPL-SCNC: 136 MMOL/L (ref 135–147)
WBC # BLD AUTO: 14.39 THOUSAND/UL (ref 4.31–10.16)

## 2023-07-09 PROCEDURE — 99222 1ST HOSP IP/OBS MODERATE 55: CPT | Performed by: INTERNAL MEDICINE

## 2023-07-09 PROCEDURE — 94760 N-INVAS EAR/PLS OXIMETRY 1: CPT

## 2023-07-09 PROCEDURE — 94761 N-INVAS EAR/PLS OXIMETRY MLT: CPT

## 2023-07-09 PROCEDURE — 80048 BASIC METABOLIC PNL TOTAL CA: CPT | Performed by: PHYSICIAN ASSISTANT

## 2023-07-09 PROCEDURE — 82948 REAGENT STRIP/BLOOD GLUCOSE: CPT

## 2023-07-09 PROCEDURE — 85025 COMPLETE CBC W/AUTO DIFF WBC: CPT | Performed by: PHYSICIAN ASSISTANT

## 2023-07-09 PROCEDURE — 99024 POSTOP FOLLOW-UP VISIT: CPT | Performed by: SURGERY

## 2023-07-09 RX ORDER — INSULIN LISPRO 100 [IU]/ML
4-20 INJECTION, SOLUTION INTRAVENOUS; SUBCUTANEOUS
Status: DISCONTINUED | OUTPATIENT
Start: 2023-07-09 | End: 2023-07-10 | Stop reason: HOSPADM

## 2023-07-09 RX ORDER — ALBUTEROL SULFATE 90 UG/1
2 AEROSOL, METERED RESPIRATORY (INHALATION) EVERY 6 HOURS PRN
Qty: 6.7 G | Refills: 0 | Status: SHIPPED | OUTPATIENT
Start: 2023-07-09

## 2023-07-09 RX ORDER — FUROSEMIDE 10 MG/ML
20 INJECTION INTRAMUSCULAR; INTRAVENOUS ONCE
Status: COMPLETED | OUTPATIENT
Start: 2023-07-09 | End: 2023-07-09

## 2023-07-09 RX ADMIN — FUROSEMIDE 20 MG: 10 INJECTION, SOLUTION INTRAMUSCULAR; INTRAVENOUS at 17:41

## 2023-07-09 RX ADMIN — CEFAZOLIN SODIUM 2000 MG: 2 SOLUTION INTRAVENOUS at 05:36

## 2023-07-09 RX ADMIN — METRONIDAZOLE 500 MG: 500 INJECTION, SOLUTION INTRAVENOUS at 11:31

## 2023-07-09 RX ADMIN — INSULIN LISPRO 4 UNITS: 100 INJECTION, SOLUTION INTRAVENOUS; SUBCUTANEOUS at 11:31

## 2023-07-09 RX ADMIN — METRONIDAZOLE 500 MG: 500 INJECTION, SOLUTION INTRAVENOUS at 19:19

## 2023-07-09 RX ADMIN — CEFTRIAXONE SODIUM 2000 MG: 10 INJECTION, POWDER, FOR SOLUTION INTRAVENOUS at 17:41

## 2023-07-09 RX ADMIN — INSULIN LISPRO 4 UNITS: 100 INJECTION, SOLUTION INTRAVENOUS; SUBCUTANEOUS at 05:43

## 2023-07-09 RX ADMIN — METRONIDAZOLE 500 MG: 500 INJECTION, SOLUTION INTRAVENOUS at 03:59

## 2023-07-09 RX ADMIN — SODIUM CHLORIDE, SODIUM LACTATE, POTASSIUM CHLORIDE, AND CALCIUM CHLORIDE 100 ML/HR: .6; .31; .03; .02 INJECTION, SOLUTION INTRAVENOUS at 05:36

## 2023-07-09 RX ADMIN — ENOXAPARIN SODIUM 40 MG: 40 INJECTION SUBCUTANEOUS at 08:12

## 2023-07-09 RX ADMIN — INSULIN LISPRO 2 UNITS: 100 INJECTION, SOLUTION INTRAVENOUS; SUBCUTANEOUS at 00:50

## 2023-07-09 NOTE — CONSULTS
Inpatient Medical Consultation - [facility] Internal Medicine    Patient Information: Dominik Reed 76 y.o. male MRN: [id number]  Unit/Bed#: S -01 Encounter: [id number]  PCP: Sarah Beth Saini MD  Date of Admission:  7/8/2023  Date of Consultation: 07/09/23  Requesting Physician: Leona Price MD    Reason For Consultation:   Hypoxia    Assessment/Plan:    · Suspected undiagnosed COPD with no acute exacerbation   · Suspected JOSE  · Acute hypoxic respiratory failure due to post op atelectasis and above  · Cigarette smoking  · S/p laparoscopic appendectomy   · DMII  · HTN    --- Current O2 sat 91% on RA while resting. Will rec home O2 eval before discharge. CXR clear with no evidence of pulmonary congestion. Will rec albuterol inhaler prn at home. Pt will need outpt PFT and ambulatory pulm referral. Pt is instructed to complete sleep study and oupt eval for CPAP. And certainly smoking cessation. Addendum: Pt was 87% on RA and down to 84% after walking per RT home eval. Will give one dose lasix given crackles on exam although he does not appear overly volume overloaded. Consider inpt pulm eval if pt remains hypoxic/discussed with surgery. Collaboration of Care: Were Recommendations Directly Discussed with Primary Treatment Team? - Yes     History of Present Illness:    Dominik Reed is a 76 y.o. male with ongoing cigarette smoking, DMII and  HTN who is originally admitted to the surgery service on 7/8/2023 due to appendicitis s/p laparoscopic appendectomy. We are consulted for hypoxia. Postoperatively Pt developed hypoxia and required brief use of BiPAP than changed to NC 5 liters once pt was fully awake. At this point Pt is sating 91% on RA while resting in his bed. Denies CP, leg pain or dyspnea. He reports that there are times when he would hear himself wheezing while feeling SOB at home. Pt is a long time cigarette smoker. He managed to quit for two years but unfortunately he went back to smoking again.  He understands he might have COPD and expressed his willingness to quit again. Pt states that he snores " a lot" so much so that his wife sleeps in separate beds. He was told by his PCP to complete the testing for sleep study and have not been table to do so. Review of Systems:    Review of Systems   Constitutional: Negative for diaphoresis and fatigue. Respiratory: Negative for cough, chest tightness and shortness of breath. Cardiovascular: Negative for palpitations and leg swelling. Gastrointestinal: Negative for abdominal pain, nausea and vomiting. Musculoskeletal: Negative for back pain. All other systems reviewed and are negative. Past Medical and Surgical History:     Past Medical History:   Diagnosis Date   • Acquired hypothyroidism    • Anxiety    • Anxiety disorder    • Cigarette smoker    • Colon cancer screening declined     does understand the risk of missing colon cancer - As per [employer]   • Diabetes mellitus ([address])    • Diabetes mellitus, type 2 ([address])    • Dyslipidemia    • Essential hypertension, benign    • Hyperlipidemia    • Hypertension    • Kidney stone    • Type II diabetes mellitus, uncontrolled        Past Surgical History:   Procedure Laterality Date   • CARDIAC CATHETERIZATION      09 no obstructive- Dr. Trujillo Memory   • Shannon Diss      ?  s/p stone removal         Meds/Allergies:       Allergies: No Known Allergies    Social History:     Marital Status:     Substance Use History:   Social History     Substance and Sexual Activity   Alcohol Use Not Currently   • Alcohol/week: 1.0 standard drink of alcohol   • Types: 1 Glasses of wine per week     Social History     Tobacco Use   Smoking Status Every Day   • Packs/day: 0.50   • Years: 5.00   • Total pack years: 2.50   • Types: Cigarettes   • Last attempt to quit: 2018   • Years since quittin.8   Smokeless Tobacco Never     Social History     Substance and Sexual Activity   Drug Use Yes   • Types: Marijuana       Family History:    Family History   Problem Relation Age of Onset   • Heart disease Father    • Heart attack Father        Physical Exam:     Vitals:   Blood Pressure: 123/82 (07/09/23 1139)  Pulse: 91 (07/09/23 1139)  Temperature: 98.3 °F (36.8 °C) (07/09/23 1139)  Temp Source: Temporal (07/08/23 1847)  Respirations: 16 (07/09/23 1139)  Weight - Scale: 113 kg (248 lb 7.3 oz) (07/08/23 1413)  SpO2: (!) 88 % (07/09/23 1139)    Physical Exam  Constitutional:       General: He is not in acute distress. Appearance: He is not ill-appearing, toxic-appearing or diaphoretic. Eyes:      General:         Right eye: No discharge. Left eye: No discharge. Cardiovascular:      Rate and Rhythm: Normal rate. Pulses: Normal pulses. Pulmonary:      Effort: Pulmonary effort is normal. No respiratory distress. Breath sounds: Rales present. No wheezing. Abdominal:      General: There is no distension. Palpations: Abdomen is soft. Tenderness: There is no abdominal tenderness. Musculoskeletal:         General: No swelling. Skin:     General: Skin is warm. Neurological:      Mental Status: He is oriented to person, place, and time. Psychiatric:         Mood and Affect: Mood normal.         Behavior: Behavior normal.         (Additional Data:     Lab Results:     Results from last 7 days   Lab Units 07/09/23  0515   WBC Thousand/uL 14.39*   HEMOGLOBIN g/dL 14.3   HEMATOCRIT % 43.7   PLATELETS Thousands/uL 201   NEUTROS PCT % 84*   LYMPHS PCT % 11*   MONOS PCT % 4   EOS PCT % 0     Results from last 7 days   Lab Units 07/09/23  0515 07/08/23  1442   POTASSIUM mmol/L 4.4 4.4   CHLORIDE mmol/L 105 103   CO2 mmol/L 25 29   BUN mg/dL 18 20   CREATININE mg/dL 0.84 0.90   CALCIUM mg/dL 8.5 9.6   ALK PHOS U/L  --  37   ALT U/L  --  22   AST U/L  --  17           Imaging:     XR chest portable    Result Date: 7/9/2023  Narrative: CHEST INDICATION:   in PACU. cough, s/p appy. COMPARISON: CXR 9/20/2018 and abdomen CT 7/8/2023. EXAM PERFORMED/VIEWS:  XR CHEST PORTABLE. FINDINGS: Cardiomediastinal silhouette normal. Lungs clear. No effusion or pneumothorax. Upper abdomen normal. Stable mild elevation of the right hemidiaphragm. Bones normal for age. Impression: No acute cardiopulmonary disease. Workstation performed: [id number]     CT abdomen pelvis with contrast    Result Date: 7/8/2023  Narrative: CT ABDOMEN AND PELVIS WITH IV CONTRAST INDICATION:   RLQ abdominal pain (Age >= 14y) RLQ pain. COMPARISON: CT abdomen pelvis high-volume bleeding scan 6/26/2022. TECHNIQUE:  CT examination of the abdomen and pelvis was performed. Multiplanar 2D reformatted images were created from the source data. This examination, like all CT scans performed in the [facility], was performed utilizing techniques to minimize radiation dose exposure, including the use of iterative reconstruction and automated exposure control. Radiation dose length product (DLP) for this visit:  824 mGy-cm IV Contrast:  100 mL of iohexol (OMNIPAQUE) Enteric Contrast:  Enteric contrast was not administered. FINDINGS: ABDOMEN LOWER CHEST: Discoid atelectasis at the right lung base. Small hiatal hernia. LIVER/BILIARY TREE:  Unremarkable. GALLBLADDER:  No calcified gallstones. No pericholecystic inflammatory change. SPLEEN:  Unremarkable. PANCREAS:  Unremarkable. ADRENAL GLANDS:  Unremarkable. KIDNEYS/URETERS: No hydronephrosis or urinary tract calculus. One or more sharply circumscribed subcentimeter renal hypodensities are present, too small to accurately characterize, and statistically most likely benign findings. According to recent literature (Radiology 2019) no further workup of these findings is recommended. STOMACH AND BOWEL: There is colonic diverticulosis without evidence of acute diverticulitis.  APPENDIX: Dilated thickened appendix with mild wall thickening and surrounding fat stranding in keeping with mild acute appendicitis. ABDOMINOPELVIC CAVITY:  No ascites. No pneumoperitoneum. No lymphadenopathy. VESSELS:  Unremarkable for patient's age. PELVIS REPRODUCTIVE ORGANS:  Unremarkable for patient's age. URINARY BLADDER:  Unremarkable. ABDOMINAL WALL/INGUINAL REGIONS:  Unremarkable. OSSEOUS STRUCTURES:  No acute fracture or destructive osseous lesion. Impression: Acute uncomplicated appendicitis. Workstation performed: [id number]       ** Please Note: This note has been constructed using a voice recognition system.  **

## 2023-07-09 NOTE — DISCHARGE INSTR - AVS FIRST PAGE
Please call the office when you leave to schedule an appointment for 2 weeks. Please call [phone]. [address], [address], [place], [address]. Off of [address] between [facility] and [facility]. Activity:    May lift 10 lb as many times as desired the 1st week,       20 lb in 2 weeks,       30 lb in 3 weeks. Walking is encouraged  Normal daily activities including climbing steps are okay  Do not engage in strenuous activity ( sit-ups or crutches) or contact sports for 4-6 weeks post-operatively    Return to Work:   Okay to return to work when you feel well if you desire. Diet:   You may return to your normal healthy diet. Wound Care: Your wound is closed with dissolvable stitches and glue. It is okay to shower. Wash incision gently with soap and water and pat dry. Do not soak incisions in bath water or swim for two weeks. Do not apply any creams or ointments. Pain Medication:   Please take as directed if needed. May use Advil or Motrin in addition. Recall, the pain medicine and anesthesia is associated with constipation. No driving while taking narcotic pain medications. Other: It is normal to developed a “healing ridge” / firm incision after surgery. This is your body making scar tissue. It is a good sign  Constipation is very common after general anesthesia. Please use milk of magnesia as needed in order to help prevent constipation. It is normal to get bruising after surgery. If you have questions after discharge please call the office.     If you have increased pain, fever >101.5, increased drainage, redness or a bad smell at your surgery site, please call us immediately or come directly to the Emergency Room

## 2023-07-09 NOTE — PROGRESS NOTES
Progress Note - General Surgery   JEANA Resident on SURGERY Service   Dominik Reed 76 y.o. male MRN: [id number]  Unit/Bed#: S -01 Encounter: [id number]    Assessment:  76 y.o. M now POD 1 s/p laparoscopic appendectomy    Afebrile. VSS. Currently on 5L NC dropped down to 2L NC saturating >92%  WBC 14.3 from 15.6  Hgb 14.3 from 16.5  Cr 0.84    Plan:  -diabetic diet  -dc fluids  -dc abx  -PRN pain meds  -PRN anti nausea  -dvt prophylaxis  -oob and ambulating  -encourage IS  -wean off oxygen, and if so, patient is stable for discharge    Subjective/Objective     Subjective: No acute events overnight. Patient denies having nausea, vomiting, fevers, chills, chest pain, shortness of breath. Tolerating oral diet. Voiding without difficulty. Passing flatus. Not having bowel movements. Objective:     Blood pressure 102/68, pulse 86, temperature (!) 97.3 °F (36.3 °C), resp. rate 18, weight 113 kg (248 lb 7.3 oz), SpO2 92 %. ,Body mass index is 33.7 kg/m². Intake/Output Summary (Last 24 hours) at 7/9/2023 0840  Last data filed at 7/9/2023 0009  Gross per 24 hour   Intake 1550 ml   Output 400 ml   Net 1150 ml       Invasive Devices     Peripheral Intravenous Line  Duration           Peripheral IV 07/08/23 Distal;Right;Upper;Ventral (anterior) Arm <1 day                General: NAD  HENT: NCAT MMM  Neck: supple, no JVD  CV: nl rate  Lungs: nl wob. No resp distress  ABD: Soft, appropriately tender, nondistended. Incisions c/d/i.   Extrem: No CCE  Neuro: AAOx3       Scheduled Meds:  Current Facility-Administered Medications   Medication Dose Route Frequency Provider Last Rate   • acetaminophen  650 mg Oral Q6H PRN Christina Yue, PA-C     • ALPRAZolam  0.5 mg Oral Q6H PRN Christina Yue, PA-C     • cefTRIAXone  2,000 mg Intravenous Q24H Christina Yue, PA-C     • enoxaparin  40 mg Subcutaneous Daily Christina LYNDA Turner-C     • HYDROmorphone  0.5 mg Intravenous Q4H PRN Christinakaterin Turner PA-C     • HYDROmorphone  0.2 mg Intravenous Q4H PRN Christina JEANIE Turner     • HYDROmorphone  0.2 mg Intravenous Q2H PRN Christina JEANIE Turner     • insulin lispro  2-12 Units Subcutaneous Q6H [facility] Christina JEANIE Turner     • labetalol  10 mg Intravenous Q6H PRN Christina JEANIE Turner     • metroNIDAZOLE  500 mg Intravenous Q8H Christina JEANIE Turner 500 mg (07/09/23 0359)   • ondansetron  4 mg Intravenous Q6H PRN Christina JEANIE Turner       Continuous Infusions:   PRN Meds:.•  acetaminophen  •  ALPRAZolam  •  HYDROmorphone  •  HYDROmorphone  •  HYDROmorphone  •  labetalol  •  ondansetron      Lab, Imaging and other studies:I have personally reviewed pertinent lab results.     VTE Pharmacologic Prophylaxis: Enoxaparin (Lovenox)  VTE Mechanical Prophylaxis: sequential compression device      Luis Goodman MD  7/9/2023 8:40 AM

## 2023-07-09 NOTE — OP NOTE
OPERATIVE REPORT  PATIENT NAME: Dominik Reed    :  1955  MRN: [id number]  Pt Location: AN OR ROOM 01    SURGERY DATE: 2023    Surgeon(s) and Role:     * Jr Reza MD - Primary     * Osman Conway PA-C - Assisting as no other qualified surgical resident is available. Her assistance is needed for exposure and retraction. Preop Diagnosis:  Acute appendicitis with localized peritonitis, without perforation, abscess, or gangrene [K35.30]    Post-Op Diagnosis Codes:     * Acute appendicitis with localized peritonitis, without perforation, abscess, or gangrene [K35.30]    Procedure(s):  APPENDECTOMY LAPAROSCOPIC    Specimen(s):  ID Type Source Tests Collected by Time Destination   1 : Appendix Tissue Appendix TISSUE EXAM Jr Reza MD 2023  7:36 PM        Estimated Blood Loss:   Minimal    Drains:  [REMOVED] NG/OG/Enteral Tube Orogastric 18 Fr Center mouth (Removed)   Number of days: 0       Anesthesia Type:   General    Operative Indications:  Acute appendicitis with localized peritonitis, without perforation, abscess, or gangrene [K35.30]    Independent, non-smoker, ASA 3 E    wound class II, BMI 34, weight 250, height 72  Obesity, atelectasis, hypoxemia, clinical sepsis. Complications:   None    Procedure and Technique:  Patient was identified visually and by armband. Placed in supine position. After anesthesia the abdomen was prepped and draped in sterile fashion. Quarter percent Marcaine with epinephrine was utilized. Incision was made over the umbilical hernia defect. This carried down through skin subtenons tissue. Under direct vision a 12 mm trocar was inserted followed by CO2 insufflation with a back pressure of 15.  2 additional 5 mm ports were then placed. Laparoscopic visualization revealed acute suppurative appendicitis. Mesoappendix was divided using harmonic scalpel. The base of the appendix was divided using Endo JORDAN stapling device.     The areas irrigated aspirated dry. Hemostasis was assured. The appendix was placed Endo Catch bag and removed to the umbilicus. Fascia was closed with 0 Vicryl suture followed by 4-0 Monocryl suture and Dermabond. Patient tolerated this procedure well. Sponge and instrument count correct x2. I was present for the entire procedure.     Patient Disposition:  Stable  To PACU     This procedure was not performed to treat colon cancer through resection      SIGNATURE: Josh Leary MD  DATE: July 8, 2023  TIME: 8:00 PM

## 2023-07-09 NOTE — RESPIRATORY THERAPY NOTE
07/09/23 0843   Respiratory Protocol   Protocol Initiated? Yes   Protocol Selection Respiratory   Language Barrier? No   Medical & Social History Reviewed? Yes   Diagnostic Studies Reviewed? Yes   Physical Assessment Performed? Yes   Home Devices/Therapy Other (Comment)  (none)   Respiratory Plan No distress/Pulmonary history; Discontinue Protocol   Respiratory Assessment   Assessment Type Assess only   General Appearance Alert; Awake   Respiratory Pattern Normal   Chest Assessment Chest expansion symmetrical   Bilateral Breath Sounds Diminished   Resp Comments Patient has bipap in the room due to low oxygen post surgery. patient has no hx of pulmonary compaints. No use of inhalers/nebs/bipap/cpap. Patient has a kit to be tested for sleep apnea. Does not want to use hospital unit at this time. Clear/diminished BBS.  Patient on 2L NC. 90%

## 2023-07-09 NOTE — ANESTHESIA POSTPROCEDURE EVALUATION
Post-Op Assessment Note    CV Status:  Stable    Pain management: adequate     Mental Status:  Alert and awake   Hydration Status:  Stable   Airway Patency:  Patent      Post Op Vitals Reviewed: Yes      Staff: Anesthesiologist, CRNA   Comments: on 02 mask pulse ox 95%, awake andd SV        No notable events documented.     /61 (07/08/23 2015)    Temp 99.7 °F (37.6 °C) (07/08/23 2015)    Pulse (!) 113 (07/08/23 2015)   Resp 18 (07/08/23 2015)    SpO2 94 % (07/08/23 2015)

## 2023-07-09 NOTE — QUICK NOTE
Patient presents with clinical sepsis. Temperature elevation 102.3, tachycardic, mild tachypnea. Mild hypoxemia to pulse ox on room air of 90%. Patient already had antibiotics in the emergency room and therefore blood cultures not obtained. Will obtain chest x-ray postop. Lower half lung field negative by CAT scan. Aspiration of lungs during anesthesia with clear mucus. 78

## 2023-07-09 NOTE — QUICK NOTE
Post Op Check:    76 y.o. M now POD 0 s/p laparoscopic appendectomy. Currently on 5L NC saturating >92%. Has not required bipap. The patient denied any nausea, vomiting, chest pain, shortness of breath, fevers, chills. Endorses some minimal abdominal pain. Has yet to have a bowel movement or pass flatus. Voiding w/o difficulty. Vitals:    07/09/23 0002   BP: 132/84   Pulse: 88   Resp:    Temp: 98.8 °F (37.1 °C)   SpO2: 92%       General: NAD  HENT: NCAT MMM  Neck: supple, no JVD  CV: nl rate  Lungs: nl wob. No resp distress  ABD: Soft, appropriately tender, nondistended. Incisions c/d/i.   Extrem: No CCE  Neuro: AAOx3     Plan:  Diet Mannie/CHO Controlled; Consistent Carbohydrate Diet Level 2 (5 carb servings/75 grams CHO/meal)  Jody@[employer]  Continue to wean oxygen as tolerated  Pain and nausea control PRN  OOB and ambulating  Encourage IS  Most likely plan for discharge tomorrow AM    Luis Goodman MD  General Surgery Resident

## 2023-07-09 NOTE — RESPIRATORY THERAPY NOTE
Home Oxygen Qualifying Test     Patient name: Dominik Reed        : 1955   Date of Test:  2023  Diagnosis:    Home Oxygen Test:    **Medicare Guidelines require item(s) 1-5 on all ambulatory patients or 1 and 2 on non-ambulatory patients. 1. Baseline SPO2 on Room Air at rest  87%   a. If <= 88% on Room Air add O2 via NC to obtain SpO2 >=88%. If LPM needed, document 3 LPM  needed to reach =>88%    2. SPO2 during exertion on Room Air  84 %  a. During exertion monitor SPO2. If SPO2 increases >=89%, do not add supplemental oxygen    3. SPO2 on Oxygen at Rest  92% at  3LPM    4. SPO2 during exertion on Oxygen  92% at 3 LPM    5. Test performed during exertion activity. [x]  Supplemental Home Oxygen is indicated. []  Client does not qualify for home oxygen. Respiratory Additional Notes- patient preformed 6MWT without incident. Patient denies SOB/WOB. Patient requires 3L 24/7.    Rosalva Clifford, RT

## 2023-07-10 VITALS
HEART RATE: 78 BPM | TEMPERATURE: 98.7 F | DIASTOLIC BLOOD PRESSURE: 74 MMHG | WEIGHT: 248.24 LBS | BODY MASS INDEX: 33.67 KG/M2 | RESPIRATION RATE: 18 BRPM | OXYGEN SATURATION: 93 % | SYSTOLIC BLOOD PRESSURE: 129 MMHG

## 2023-07-10 PROBLEM — R09.02 HYPOXIA: Status: ACTIVE | Noted: 2023-07-10

## 2023-07-10 LAB
ANION GAP SERPL CALCULATED.3IONS-SCNC: 6 MMOL/L
BNP SERPL-MCNC: 95 PG/ML (ref 0–100)
BUN SERPL-MCNC: 24 MG/DL (ref 5–25)
CALCIUM SERPL-MCNC: 8.4 MG/DL (ref 8.4–10.2)
CHLORIDE SERPL-SCNC: 108 MMOL/L (ref 96–108)
CO2 SERPL-SCNC: 24 MMOL/L (ref 21–32)
CREAT SERPL-MCNC: 0.84 MG/DL (ref 0.6–1.3)
ERYTHROCYTE [DISTWIDTH] IN BLOOD BY AUTOMATED COUNT: 12.8 % (ref 11.6–15.1)
GFR SERPL CREATININE-BSD FRML MDRD: 89 ML/MIN/1.73SQ M
GLUCOSE SERPL-MCNC: 124 MG/DL (ref 65–140)
GLUCOSE SERPL-MCNC: 131 MG/DL (ref 65–140)
GLUCOSE SERPL-MCNC: 134 MG/DL (ref 65–140)
GLUCOSE SERPL-MCNC: 138 MG/DL (ref 65–140)
HCT VFR BLD AUTO: 42.5 % (ref 36.5–49.3)
HGB BLD-MCNC: 13.7 G/DL (ref 12–17)
MCH RBC QN AUTO: 32.7 PG (ref 26.8–34.3)
MCHC RBC AUTO-ENTMCNC: 32.2 G/DL (ref 31.4–37.4)
MCV RBC AUTO: 101 FL (ref 82–98)
PLATELET # BLD AUTO: 183 THOUSANDS/UL (ref 149–390)
PMV BLD AUTO: 10.4 FL (ref 8.9–12.7)
POTASSIUM SERPL-SCNC: 4.2 MMOL/L (ref 3.5–5.3)
RBC # BLD AUTO: 4.19 MILLION/UL (ref 3.88–5.62)
SODIUM SERPL-SCNC: 138 MMOL/L (ref 135–147)
WBC # BLD AUTO: 12.4 THOUSAND/UL (ref 4.31–10.16)

## 2023-07-10 PROCEDURE — 83880 ASSAY OF NATRIURETIC PEPTIDE: CPT | Performed by: INTERNAL MEDICINE

## 2023-07-10 PROCEDURE — 85027 COMPLETE CBC AUTOMATED: CPT

## 2023-07-10 PROCEDURE — 82948 REAGENT STRIP/BLOOD GLUCOSE: CPT

## 2023-07-10 PROCEDURE — NC001 PR NO CHARGE: Performed by: SURGERY

## 2023-07-10 PROCEDURE — 99222 1ST HOSP IP/OBS MODERATE 55: CPT | Performed by: NURSE PRACTITIONER

## 2023-07-10 PROCEDURE — 80048 BASIC METABOLIC PNL TOTAL CA: CPT

## 2023-07-10 PROCEDURE — 99238 HOSP IP/OBS DSCHRG MGMT 30/<: CPT | Performed by: SURGERY

## 2023-07-10 PROCEDURE — 99222 1ST HOSP IP/OBS MODERATE 55: CPT | Performed by: INTERNAL MEDICINE

## 2023-07-10 RX ORDER — OXYCODONE HYDROCHLORIDE 5 MG/1
5 TABLET ORAL EVERY 4 HOURS PRN
Status: DISCONTINUED | OUTPATIENT
Start: 2023-07-10 | End: 2023-07-10 | Stop reason: HOSPADM

## 2023-07-10 RX ORDER — ACETAMINOPHEN 500 MG
500 TABLET ORAL EVERY 6 HOURS PRN
Refills: 0 | COMMUNITY
Start: 2023-07-10

## 2023-07-10 RX ORDER — OXYCODONE HYDROCHLORIDE 10 MG/1
10 TABLET ORAL EVERY 4 HOURS PRN
Status: DISCONTINUED | OUTPATIENT
Start: 2023-07-10 | End: 2023-07-10 | Stop reason: HOSPADM

## 2023-07-10 RX ADMIN — METRONIDAZOLE 500 MG: 500 INJECTION, SOLUTION INTRAVENOUS at 03:31

## 2023-07-10 RX ADMIN — METRONIDAZOLE 500 MG: 500 INJECTION, SOLUTION INTRAVENOUS at 11:20

## 2023-07-10 RX ADMIN — ENOXAPARIN SODIUM 40 MG: 40 INJECTION SUBCUTANEOUS at 08:23

## 2023-07-10 NOTE — UTILIZATION REVIEW
Initial Clinical Review    Admission: Date/Time/Statement:   Admission Orders (From admission, onward)     Ordered        07/08/23 2011  Inpatient Admission  Once                      Orders Placed This Encounter   Procedures   • Inpatient Admission     Standing Status:   Standing     Number of Occurrences:   1     Order Specific Question:   Level of Care     Answer:   Level 2 Stepdown / HOT [14]     Order Specific Question:   Estimated length of stay     Answer:   More than 2 Midnights     Order Specific Question:   Certification     Answer:   I certify that inpatient services are medically necessary for this patient for a duration of greater than two midnights. See H&P and MD Progress Notes for additional information about the patient's course of treatment. ED Arrival Information     Expected   -    Arrival   7/8/2023 14:04    Acuity   Urgent            Means of arrival   Walk-In    Escorted by   Self    Service   Surgery-General    Admission type   Emergency            Arrival complaint   ABDOMINAL PAIN           Chief Complaint   Patient presents with   • Abdominal Pain     Pt presents to ED for RLQ abdominal pain. States this pain feels similar to his diverticulitis flare up. Denies N/V/D       Initial Presentation: 76 y.o. male , presented to the ED @ [address] [place] [address], from home via walk in. Admitted as Inpatient due to Acute Appendicitis. Date: 07/08/2023    Presents with abdominal pain x2-3 days. Pt states RLQ pain began 2-3 days ago, associated with Tmax 100, chills. NPO. IV flds. IV Abx. Analgesia PRN. Antiemetics PRN. DVT PPX. SSI. O2 @ 5L via NC.      SURGERY DATE: 7/8/2023   Procedure(s):  APPENDECTOMY LAPAROSCOPIC  Anesthesia Type:  General    Day 2: 07/09/2023  Diabetic diet. IV flds. IV Abx. Pain meds PRN. Antiemetics PRN. OOB and Ambulation. Encourage IS. Wean Off O2. Date: 07/10/2023.      Day 3: Has surpassed a 2nd midnight with active treatments and services, which include IV abx. Lovenox. O2 @ 3L via NC,  Wean. Analgesia PRN. Antiemetic PRN.      07/10/2023  Consult Pulmonary:  Patient without baseline oxygen requirement being discharged on 3 L by nasal cannula. Likely a chronic component of his hypoxia superimposed on postop atelectasis. No symptoms suggestive of PE or pneumonia. Discussed with patient to monitor pulse ox at home to maintain saturations greater than 89% and to be compliant with oxygen use throughout the day and at night. He is also aware not to smoke near the oxygen. Discussed with him he will need outpatient testing including complete PFTs and sleep study. Patient will be discharged with as needed albuterol inhaler. Proper use discussed. Also reinforced with him the need for regular use of incentive spirometer. Proper technique was demonstrated. Patient appears to be committed to smoking cessation. Has nicotine patches at home if he needs them.       ED Triage Vitals   Temperature Pulse Respirations Blood Pressure SpO2   07/08/23 1413 07/08/23 1413 07/08/23 1413 07/08/23 1413 07/08/23 1413   99 °F (37.2 °C) 95 16 148/70 90 %      Temp Source Heart Rate Source Patient Position - Orthostatic VS BP Location FiO2 (%)   07/08/23 1413 07/08/23 1413 07/09/23 1524 07/08/23 1413 --   Oral Monitor Lying Right arm       Pain Score       07/08/23 2030       No Pain          Wt Readings from Last 1 Encounters:   07/10/23 113 kg (248 lb 3.8 oz)     Additional Vital Signs:   Date/Time Temp Pulse Resp BP MAP (mmHg) SpO2 Calculated FIO2 (%) - Nasal Cannula O2 Flow Rate (L/min) Nasal Cannula O2 Flow Rate (L/min) O2 Device Cardiac (WDL) Patient Position - Orthostatic VS   07/10/23 14:06:04 98.7 °F (37.1 °C) 78 18 129/74 92 93 % -- -- -- -- -- --   07/10/23 11:54:34 98.5 °F (36.9 °C) 81 18 130/73 92 90 % -- -- -- -- -- --   07/10/23 08:16:53 -- 73 -- -- -- 94 % -- -- -- -- -- --   07/10/23 07:32:13 97.9 °F (36.6 °C) 69 14 137/82 100 88 % Abnormal  -- -- -- -- -- --   07/10/23 07:29:34 97.9 °F (36.6 °C) 73 14 150/90 110 86 % Abnormal  -- -- -- -- -- --   07/10/23 03:11:32 98.4 °F (36.9 °C) 69 18 136/80 99 93 % -- -- -- -- -- --   07/09/23 22:22:24 98.8 °F (37.1 °C) 67 18 141/77 98 91 % -- -- -- -- -- --   07/09/23 19:13:45 99.3 °F (37.4 °C) 79 18 119/82 94 85 % Abnormal  -- -- -- -- -- --   07/09/23 1547 -- -- -- -- -- 92 % 32 -- 3 L/min Nasal cannula -- --   07/09/23 15:24:45 98.6 °F (37 °C) 92 16 126/74 91 84 % Abnormal  -- -- -- None (Room air) -- Lying   07/09/23 1200 -- -- -- -- -- -- -- -- -- None (Room air) -- --   07/09/23 11:39:57 98.3 °F (36.8 °C) 91 16 123/82 96 88 % Abnormal  -- -- -- -- -- --   07/09/23 0843 -- -- -- -- -- 90 % 28 -- 2 L/min Nasal cannula -- --   07/09/23 07:49:17 97.3 °F (36.3 °C) Abnormal  86 -- 102/68 79 92 % -- -- -- -- -- --   07/09/23 01:46:45 98.8 °F (37.1 °C) 95 -- 114/79 91 95 % -- -- -- -- -- --   07/09/23 00:02:15 98.8 °F (37.1 °C) 88 -- 132/84 100 92 % -- -- -- -- -- --   07/08/23 22:54:27 99.4 °F (37.4 °C) 94 -- 123/78 93 91 % -- -- -- -- -- --   07/08/23 21:55:09 99.4 °F (37.4 °C) 95 -- 115/75 88 89 % Abnormal  -- -- -- -- -- --   07/08/23 20:58:08 100.3 °F (37.9 °C) 101 -- 107/70 82 90 % -- -- -- -- -- --   07/08/23 2042 101 °F (38.3 °C) Abnormal  100 18 120/70 -- 96 % -- 5 L/min -- Simple mask WDL --   07/08/23 2030 101 °F (38.3 °C) Abnormal  103 18 123/70 -- 92 % -- 5 L/min -- Simple mask -- --   07/08/23 2015 101 °F (38.3 °C) Abnormal  113 Abnormal  18 109/61 -- 94 % -- 5 L/min -- Simple mask WDL --   07/08/23 1849 -- -- -- -- -- 90 % -- -- -- -- -- --   07/08/23 1847 102.3 °F (39.1 °C) Abnormal  109 Abnormal  18 163/82 -- 85 % Abnormal  -- -- -- None (Room air) -- --     Date and Time Eye Opening Best Verbal Response Best Motor Response [place] Coma Scale Score   07/10/23 0800 4 5 6 15   07/09/23 2000 4 5 6 15   07/09/23 0800 4 5 6 15   07/08/23 2115 4 5 6 15   07/08/23 2042 4 5 6 15   07/08/23 2030 4 5 6 15   07/08/23 2015 4 5 6 15         Pertinent Labs/Diagnostic Test Results:   XR chest portable   Final Result by Teresa Wallis MD (07/09 1054)      No acute cardiopulmonary disease. CT abdomen pelvis with contrast   Final Result by Roxana Brantley MD (07/08 1752)      Acute uncomplicated appendicitis.         Results from last 7 days   Lab Units 07/10/23  0445 07/09/23  0515 07/08/23  1442   WBC Thousand/uL 12.40* 14.39* 15.63*   HEMOGLOBIN g/dL 13.7 14.3 16.5   HEMATOCRIT % 42.5 43.7 49.8*   PLATELETS Thousands/uL 183 201 214   NEUTROS ABS Thousands/µL  --  12.16* 11.60*     Results from last 7 days   Lab Units 07/10/23  0445 07/09/23  0515 07/08/23  1442   SODIUM mmol/L 138 136 138   POTASSIUM mmol/L 4.2 4.4 4.4   CHLORIDE mmol/L 108 105 103   CO2 mmol/L 24 25 29   ANION GAP mmol/L 6 6 6   BUN mg/dL 24 18 20   CREATININE mg/dL 0.84 0.84 0.90   EGFR ml/min/1.73sq m 89 89 87   CALCIUM mg/dL 8.4 8.5 9.6     Results from last 7 days   Lab Units 07/08/23  1442   AST U/L 17   ALT U/L 22   ALK PHOS U/L 37   TOTAL PROTEIN g/dL 6.6   ALBUMIN g/dL 4.4   TOTAL BILIRUBIN mg/dL 0.78     Results from last 7 days   Lab Units 07/10/23  1157 07/10/23  0735 07/09/23  2126 07/09/23  1732 07/09/23  1130 07/09/23  0541 07/09/23  0005 07/08/23  2104   POC GLUCOSE mg/dl 134 124 121 142* 182* 216* 193* 173*     Results from last 7 days   Lab Units 07/10/23  0445 07/09/23  0515 07/08/23  1442   GLUCOSE RANDOM mg/dL 131 213* 125       Results from last 7 days   Lab Units 07/10/23  0445   BNP pg/mL 95     Results from last 7 days   Lab Units 07/08/23  1442   LIPASE u/L 23     Results from last 7 days   Lab Units 07/08/23  1757   CLARITY UA  Turbid   COLOR UA  Yellow   SPEC GRAV UA  >1.030*   PH UA  8.5*   GLUCOSE UA mg/dl Negative   KETONES UA mg/dl Negative   BLOOD UA  Negative   PROTEIN UA mg/dl 30 (1+)*   NITRITE UA  Negative   BILIRUBIN UA  Negative   UROBILINOGEN UA (BE) mg/dl <2.0   LEUKOCYTES UA  Negative   WBC UA /hpf 0-1   RBC UA /hpf 0-1   BACTERIA UA /hpf None Seen   EPITHELIAL CELLS WET PREP /hpf None Seen     ED Treatment:   Medication Administration from 07/08/2023 1404 to 07/08/2023 1842       Date/Time Order Dose Route Action     07/08/2023 1546 EDT sodium chloride 0.9 % bolus 1,000 mL 1,000 mL Intravenous New Bag     07/08/2023 1612 EDT iohexol (OMNIPAQUE) 350 MG/ML injection (SINGLE-DOSE) 100 mL 100 mL Intravenous Given        Past Medical History:   Diagnosis Date   • Acquired hypothyroidism    • Anxiety    • Anxiety disorder    • Cigarette smoker    • Colon cancer screening declined     does understand the risk of missing colon cancer - As per [employer]   • Diabetes mellitus ([address])    • Diabetes mellitus, type 2 ([address])    • Dyslipidemia    • Essential hypertension, benign    • Hyperlipidemia    • Hypertension    • Kidney stone    • Type II diabetes mellitus, uncontrolled      Admitting Diagnosis: Abdominal pain [R10.9]  Acute appendicitis with localized peritonitis, without perforation, abscess, or gangrene [K35.30]  Age/Sex: 76 y.o. male  Admission Orders:  Mannie/CHO controlled diet  Ambulate TID  IS  Daily weight / I&O  Jessee SCDs      Scheduled Medications:  cefTRIAXone, 2,000 mg, Intravenous, Q24H  enoxaparin, 40 mg, Subcutaneous, Daily  insulin lispro, 4-20 Units, Subcutaneous, 4x Daily (AC & HS)  metroNIDAZOLE, 500 mg, Intravenous, Q8H      Continuous IV Infusions:  lactated ringers infusion  Rate: 100 mL/hr Dose: 100 mL/hr  Freq: Continuous Route: IV  Indications of Use: IV Hydration  Last Dose: 100 mL/hr (07/09/23 0536)  Start: 07/08/23 2100 End: 07/09/23 0800      PRN Meds:  acetaminophen, 650 mg, Oral, Q6H PRN  ALPRAZolam, 0.5 mg, Oral, Q6H PRN  labetalol, 10 mg, Intravenous, Q6H PRN  ondansetron, 4 mg, Intravenous, Q6H PRN  oxyCODONE, 10 mg, Oral, Q4H PRN  oxyCODONE, 5 mg, Oral, Q4H PRN        IP CONSULT TO INTERNAL MEDICINE  IP CONSULT TO PULMONOLOGY    Network Utilization Review Department  ATTENTION: Please call with any questions or concerns to [phone] and carefully listen to the prompts so that you are directed to the right person. All voicemails are confidential.  Betsy Dawkins all requests for admission clinical reviews, approved or denied determinations and any other requests to dedicated fax number below belonging to the campus where the patient is receiving treatment.  List of dedicated fax numbers for the Facilities:  [place] DENIALS (Administrative/Medical Necessity) [phone] [address] [facility] [phone]   [address] [place] [address] [phone]   [facility] [address] [facility] [phone]   [address] [place] [address] [address] [facility] [address] [place] [address] [phone]   [address] [address] [phone]

## 2023-07-10 NOTE — PROGRESS NOTES
Progress Note - General Surgery   Dominik Reed 76 y.o. male MRN: [id number]  Unit/Bed#: S -01 Encounter: [id number]    Assessment:  69yoM w acute appendicitis now s/p laparoscopic appendectomy on 7/8    Vitals stable, afebrile, 3L NC  WBC 12.4 from 14.39  Hb 13.7 from 14.3  Cr 0.84    Multiple UOP    Plan:  -CCD2 diet  -rocephin/flagyl  -lovenox  -needs home O2 as rec by RT after eval, rec 3L  -pulm consult, appreciate recs  -SLIM on board, appreciate recs  -pain control  -dispo planning    Subjective/Objective   Subjective:   Doing well, tolerating diet, no n/v, passing flatus but not having BM yet post op, voiding without issue, ambulating. Objective:     Blood pressure 137/82, pulse 73, temperature 97.9 °F (36.6 °C), resp. rate 14, weight 113 kg (248 lb 3.8 oz), SpO2 94 %. ,Body mass index is 33.67 kg/m². No intake or output data in the 24 hours ending 07/10/23 0830    Invasive Devices     Peripheral Intravenous Line  Duration           Peripheral IV 07/08/23 Distal;Right;Upper;Ventral (anterior) Arm 1 day                Physical Exam:   General: NAD  Skin: Warm, dry, anicteric  HEENT: Normocephalic, atraumatic  CV: RRR, no m/r/g  Pulm: CTA b/l, no inc WOB, 3L NC  Abd: Soft, ND/NT, incisions c/d/i  MSK: Symmetric, no edema, no tenderness, no deformity  Neuro: AOx3, GCS 15    Lab, Imaging and other studies:  I have personally reviewed pertinent lab results.   , CBC:   Lab Results   Component Value Date    WBC 12.40 (H) 07/10/2023    HGB 13.7 07/10/2023    HCT 42.5 07/10/2023     (H) 07/10/2023     07/10/2023    RBC 4.19 07/10/2023    MCH 32.7 07/10/2023    MCHC 32.2 07/10/2023    RDW 12.8 07/10/2023    MPV 10.4 07/10/2023   , CMP:   Lab Results   Component Value Date    SODIUM 138 07/10/2023    K 4.2 07/10/2023     07/10/2023    CO2 24 07/10/2023    BUN 24 07/10/2023    CREATININE 0.84 07/10/2023    CALCIUM 8.4 07/10/2023    EGFR 89 07/10/2023     VTE Pharmacologic Prophylaxis: Sequential compression device (Venodyne)  and Enoxaparin (Lovenox)  VTE Mechanical Prophylaxis: sequential compression device

## 2023-07-10 NOTE — CONSULTS
Consultation - Pulmonary Medicine   Dominik Reed 76 y.o. male MRN: [id number]  Unit/Bed#: S -01 Encounter: [id number]      Assessment & Plan:   Acute hypoxic respiratory failure  Chronic bronchitis with suspected COPD  Suspected JOSE  Postop atelectasis  Obesity  Tobacco abuse    · Patient without baseline oxygen requirement being discharged on 3 L by nasal cannula. Likely a chronic component of his hypoxia superimposed on postop atelectasis. No symptoms suggestive of PE or pneumonia  · Discussed with patient to monitor pulse ox at home to maintain saturations greater than 89% and to be compliant with oxygen use throughout the day and at night. He is also aware not to smoke near the oxygen. · Discussed with him he will need outpatient testing including complete PFTs and sleep study  · Patient will be discharged with as needed albuterol inhaler. Proper use discussed  · Also reinforced with him the need for regular use of incentive spirometer. Proper technique was demonstrated  · Patient appears to be committed to smoking cessation. Has nicotine patches at home if he needs them. · He is aware to call the office if he has not received an appointment prior to discharge. Discussed with primary team    History of Present Illness   Physician Requesting Consult: Raza Robin MD  Reason for Consult / Principal Problem: Hypoxia  Hx and PE limited by: None  HPI: Dominik Reed is a 76y.o. year old male who presents with postop hypoxia. He is status post laparoscopic appendectomy on 7/8. Pulmonary was requested to evaluate the patient as he had persistent supplemental oxygen needs following surgery. Home O2 evaluation shows that he will need 3 L upon discharge. Patient reports when his pulse ox has been checked at home previously that he has gotten low readings. He endorses symptoms suggestive of sleep apnea including loud snoring and gasping for air.   He has chronic dyspnea and a chronic cough, typically in the morning. He is not having any chest pain or hemoptysis. Does not feel feverish or chilled. He has never formally been diagnosed with COPD or JOSE. He does not use any inhalers or CPAP machine. He has quit smoking in the past, recently picked it up again but is planning on quitting again he feels confident that he will be able to stay tobacco free. He also acknowledges the need to lose weight. He would like to follow-up with our pulmonary office in [facility]. Life stressor includes wife recently diagnosed with cancer, patient has to help her go to her appointments and chemotherapy. Inpatient consult to Pulmonology  Consult performed by: Walter Carrizales PA-C  Consult ordered by: Gita Perez MD          Review of Systems   Constitutional: Negative for chills and fever. Respiratory: Positive for shortness of breath. Cardiovascular: Negative for chest pain. All other systems reviewed and are negative.       Historical Information   Past Medical History:   Diagnosis Date   • Acquired hypothyroidism    • Anxiety    • Anxiety disorder    • Cigarette smoker    • Colon cancer screening declined     does understand the risk of missing colon cancer - As per [employer]   • Diabetes mellitus ([address])    • Diabetes mellitus, type 2 ([address])    • Dyslipidemia    • Essential hypertension, benign    • Hyperlipidemia    • Hypertension    • Kidney stone    • Type II diabetes mellitus, uncontrolled      Past Surgical History:   Procedure Laterality Date   • APPENDECTOMY LAPAROSCOPIC N/A 7/8/2023    Procedure: APPENDECTOMY LAPAROSCOPIC;  Surgeon: Keyona Menchaca MD;  Location: [facility];  Service: General   • CARDIAC CATHETERIZATION      1/19/09 no obstructive- Dr. Vero Villanueva   • [facility]      ? 2006 s/p stone removal       Social History   Social History     Substance and Sexual Activity   Alcohol Use Not Currently   • Alcohol/week: 1.0 standard drink of alcohol   • Types: 1 Glasses of wine per week     Social History     Substance and Sexual Activity   Drug Use Yes   • Types: Marijuana     E-Cigarette/Vaping   • E-Cigarette Use Never User      E-Cigarette/Vaping Substances   • Nicotine No    • THC No    • CBD No    • Flavoring No    • Other No    • Unknown No      Social History     Tobacco Use   Smoking Status Every Day   • Packs/day: 0.50   • Years: 5.00   • Total pack years: 2.50   • Types: Cigarettes   • Last attempt to quit: 2018   • Years since quittin.8   Smokeless Tobacco Never       Family History:   Family History   Problem Relation Age of Onset   • Heart disease Father    • Heart attack Father        Meds/Allergies   all current active meds have been reviewed    No Known Allergies    Objective   Vitals: Blood pressure 137/82, pulse 73, temperature 97.9 °F (36.6 °C), resp. rate 14, weight 113 kg (248 lb 3.8 oz), SpO2 94 %. ,Body mass index is 33.67 kg/m². No intake or output data in the 24 hours ending 07/10/23 1055  Invasive Devices     Peripheral Intravenous Line  Duration           Peripheral IV 23 Distal;Right;Upper;Ventral (anterior) Arm 1 day                Physical Exam  Vitals reviewed. Constitutional:       General: He is not in acute distress. Appearance: He is obese. He is not toxic-appearing. HENT:      Head: Normocephalic and atraumatic. Eyes:      General: No scleral icterus. Cardiovascular:      Rate and Rhythm: Normal rate and regular rhythm. Pulmonary:      Effort: Pulmonary effort is normal.      Breath sounds: No wheezing, rhonchi or rales. Comments: Decreased breath sounds  Abdominal:      Comments: Deferred to surgical team   Musculoskeletal:         General: No tenderness. Skin:     General: Skin is warm and dry. Neurological:      General: No focal deficit present. Mental Status: He is alert.    Psychiatric:         Mood and Affect: Mood normal.         Behavior: Behavior normal.         Lab Results: I have personally reviewed pertinent lab results. Imaging Studies: I have personally reviewed pertinent reports. EKG, Pathology, and Other Studies: I have personally reviewed pertinent reports.     VTE Prophylaxis: Enoxaparin (Lovenox)    Code Status: Level 1 - Full Code  Advance Directive and Living Will:      Power of :    POLST:

## 2023-07-10 NOTE — PROGRESS NOTES
[facility]  Progress Note  Name: Su Miranda  MRN: [id number]  Unit/Bed#: S -01 I Date of Admission: 7/8/2023   Date of Service: 7/10/2023 I Hospital Day: 2    Assessment/Plan   Hypoxia  Assessment & Plan  · Hypoxia noted during hospitalization requiring administration of O2, currently at 3 liters NC. · Patient does admit to smoking, had quit for a few years and then resumed. · Does admit to occasional snoring. · Home O2 evaluation performed; patient does qualify for 3 L N/C at all times as per respiratory screen. · Patient will benefit from smoking cessation which he indicated he is motivated to do. · Patient will also benefit from outpatient pulmonary work up for suspected sleep apnea as patient reports snoring. · He would also benefit from weight loss, increased exercise. · Pulmonary consulted inpatient; follow up on recommendations. * Acute appendicitis  Assessment & Plan  · Surgery is primary team following this patient; underwent lap appendectomy on 7/8  · Tolerating diet, passing flatus, no bm at this time  · Pain is manageable; patient declined pain medications. · Good bowel sounds noted. · Surgical incisions glued, no drainage noted     Clinical sepsis ([address])  Assessment & Plan  POA with temp 102.3,  and O2 sat 85%  Source apeendicitis  On Ceftriaxone and flagyl  Afebrile and improving          VTE Pharmacologic Prophylaxis: VTE Score: 5 High Risk (Score >/= 5) - Pharmacological DVT Prophylaxis Ordered: enoxaparin (Lovenox). Sequential Compression Devices Ordered. Patient Centered Rounds: I performed bedside rounds with nursing staff today. Discussions with Specialists or Other Care Team Provider: case management, surgical note appreciated    Education and Discussions with Family / Patient: Patient indicated he would speak with his wife .      Total Time Spent on Date of Encounter in care of patient: 45 minutes This time was spent on one or more of the following: performing physical exam; counseling and coordination of care; obtaining or reviewing history; documenting in the medical record; reviewing/ordering tests, medications or procedures; communicating with other healthcare professionals and discussing with patient's family/caregivers. Current Length of Stay: 2 day(s)  Current Patient Status: Inpatient   Certification Statement: surgery is the primary team, SLIM is consulted for medical management   Discharge Plan: [facility] is following this patient on consult. They are medically stable for discharge when deemed appropriate by primary service. Code Status: Level 1 - Full Code    Subjective:   Patient seen sitting up in bed resting comfortably. Reports minimal abdominal discomfort, is eating breakfast no nausea or vomiting. Reports passing some gas however no bowel movement at this time. Objective:     Vitals:   Temp (24hrs), Av.5 °F (36.9 °C), Min:97.9 °F (36.6 °C), Max:99.3 °F (37.4 °C)    Temp:  [97.9 °F (36.6 °C)-99.3 °F (37.4 °C)] 98.5 °F (36.9 °C)  HR:  [67-92] 81  Resp:  [14-18] 18  BP: (119-150)/(73-90) 130/73  SpO2:  [84 %-94 %] 90 %  Body mass index is 33.67 kg/m². Input and Output Summary (last 24 hours):   No intake or output data in the 24 hours ending 07/10/23 1233    Physical Exam:   Physical Exam  Vitals and nursing note reviewed. Constitutional:       Appearance: Normal appearance. HENT:      Head: Normocephalic. Nose: Nose normal.      Mouth/Throat:      Mouth: Mucous membranes are moist.   Eyes:      Extraocular Movements: Extraocular movements intact. Conjunctiva/sclera: Conjunctivae normal.      Pupils: Pupils are equal, round, and reactive to light. Cardiovascular:      Rate and Rhythm: Normal rate and regular rhythm. Pulses: Normal pulses. Heart sounds: Normal heart sounds.    Pulmonary:      Comments: On O2 3 liters nasal cannula   Abdominal:      General: Bowel sounds are normal. There is distension. Palpations: Abdomen is soft. Comments: Two surgical incisions noted; glued, edges approximated, no drainage noted. Genitourinary:     Comments: Voiding spontaneously   Musculoskeletal:         General: Normal range of motion. Cervical back: Normal range of motion. Skin:     General: Skin is warm and dry. Capillary Refill: Capillary refill takes less than 2 seconds. Neurological:      General: No focal deficit present. Mental Status: He is alert and oriented to person, place, and time. Psychiatric:         Mood and Affect: Mood normal.         Behavior: Behavior normal.         Thought Content: Thought content normal.         Judgment: Judgment normal.          Additional Data:     Labs:  Results from last 7 days   Lab Units 07/10/23  0445 07/09/23  0515   WBC Thousand/uL 12.40* 14.39*   HEMOGLOBIN g/dL 13.7 14.3   HEMATOCRIT % 42.5 43.7   PLATELETS Thousands/uL 183 201   NEUTROS PCT %  --  84*   LYMPHS PCT %  --  11*   MONOS PCT %  --  4   EOS PCT %  --  0     Results from last 7 days   Lab Units 07/10/23  0445 07/09/23  0515 07/08/23  1442   SODIUM mmol/L 138   < > 138   POTASSIUM mmol/L 4.2   < > 4.4   CHLORIDE mmol/L 108   < > 103   CO2 mmol/L 24   < > 29   BUN mg/dL 24   < > 20   CREATININE mg/dL 0.84   < > 0.90   ANION GAP mmol/L 6   < > 6   CALCIUM mg/dL 8.4   < > 9.6   ALBUMIN g/dL  --   --  4.4   TOTAL BILIRUBIN mg/dL  --   --  0.78   ALK PHOS U/L  --   --  37   ALT U/L  --   --  22   AST U/L  --   --  17   GLUCOSE RANDOM mg/dL 131   < > 125    < > = values in this interval not displayed.          Results from last 7 days   Lab Units 07/10/23  1157 07/10/23  0735 07/09/23  2126 07/09/23  1732 07/09/23  1130 07/09/23  0541 07/09/23  0005 07/08/23  2104   POC GLUCOSE mg/dl 134 124 121 142* 182* 216* 193* 173*               Lines/Drains:  Invasive Devices     Peripheral Intravenous Line  Duration           Peripheral IV 07/08/23 Distal;Right;Upper;Ventral (anterior) Arm 1 day                      Imaging: No pertinent imaging reviewed. Recent Cultures (last 7 days):         Last 24 Hours Medication List:   Current Facility-Administered Medications   Medication Dose Route Frequency Provider Last Rate   • acetaminophen  650 mg Oral Q6H PRN Jasvir Barron PA-C     • ALPRAZolam  0.5 mg Oral Q6H PRN Jasvir Barron PA-C     • cefTRIAXone  2,000 mg Intravenous Q24H Annmarie Rollins MD 2,000 mg (07/09/23 3581)   • enoxaparin  40 mg Subcutaneous Daily Jasvir Barron PA-C     • insulin lispro  4-20 Units Subcutaneous 4x Daily (AC & HS) Genesis Oro MD     • labetalol  10 mg Intravenous Q6H PRN Jasvir Barron PA-C     • metroNIDAZOLE  500 mg Intravenous Q8H Annmarie Rollins  mg (07/10/23 1120)   • ondansetron  4 mg Intravenous Q6H PRN Jasvir Barron PA-C     • oxyCODONE  10 mg Oral Q4H PRN Zulema Cuba MD     • oxyCODONE  5 mg Oral Q4H PRN Zulema Cuba MD          Today, Patient Was Seen By: LEODAN Piña    **Please Note: This note may have been constructed using a voice recognition system. **

## 2023-07-10 NOTE — CASE MANAGEMENT
Case Management Discharge Planning Note    Patient name Marion Paulino  Location S /S -01 MRN [id number]  : 1955 Date 7/10/2023       Current Admission Date: 2023  Current Admission Diagnosis:Acute appendicitis   Patient Active Problem List    Diagnosis Date Noted   • Hypoxia 07/10/2023   • Acute appendicitis 2023   • Clinical sepsis ([address]) 2023   • Depression, recurrent ([address]) 2023   • Aspirin long-term use 2022   • GI bleed 2022   • Hypotension 2022   • Melena 2022   • Needs flu shot 2021   • Body mass index 34.0-34.9, adult 2021   • Obesity, morbid ([address]) 2021   • Dyslipidemia    • Diabetes mellitus, type 2 ([address])    • Colon cancer screening declined    • Dysthymic disorder 2020   • Body mass index 36.0-36.9, adult 2020   • Hypertension    • Mixed hyperlipidemia    • Essential hypertension, benign    • Uncontrolled type 2 diabetes mellitus with hyperglycemia ([address])    • Anxiety disorder       LOS (days): 2  Geometric Mean LOS (GMLOS) (days):   Days to GMLOS:     OBJECTIVE:  Risk of Unplanned Readmission Score: 7.28         Current admission status: Inpatient   Preferred Pharmacy:   [address] [place] [address], [address] [place] [address]  Phone: [phone] Fax: [phone] [facility], [address] [place] [address]  Phone: [phone] Fax: [phone]    Primary Care Provider: Tristan Feliz MD    Primary Insurance: [place] [facility]  Secondary Insurance:     DISCHARGE DETAILS:        CM confirmed Sequal O2 concentrator was delivered to patient for transport to home and use at home until his O2 concentrator/tanks are delivered. SHYANN confirmed by Edelmira Davidson from [address] [place] [address] that the oxygen supplies will be delivered in the am but patient can discharge today as he can plug the Sequal at home and it will continue to provide 3L O2.  Patient reports family will pick him up.

## 2023-07-10 NOTE — ASSESSMENT & PLAN NOTE
· Hypoxia noted during hospitalization requiring administration of O2, currently at 3 liters NC. · Patient does admit to smoking, had quit for a few years and then resumed. · Does admit to occasional snoring. · Home O2 evaluation performed; patient does qualify for 3 L N/C at all times as per respiratory screen. · Patient will benefit from smoking cessation which he indicated he is motivated to do. · Patient will also benefit from outpatient pulmonary work up for suspected sleep apnea as patient reports snoring. · He would also benefit from weight loss, increased exercise. · Pulmonary consulted inpatient; follow up on recommendations.

## 2023-07-10 NOTE — ASSESSMENT & PLAN NOTE
· Surgery is primary team following this patient; underwent lap appendectomy on 7/8  · Tolerating diet, passing flatus, no bm at this time  · Pain is manageable; patient declined pain medications. · Good bowel sounds noted.    · Surgical incisions glued, no drainage noted

## 2023-07-10 NOTE — CASE MANAGEMENT
Case Management Discharge Planning Note    Patient name Jeannine Rasmussen  Location S /S -01 MRN [id number]  : 1955 Date 7/10/2023       Current Admission Date: 2023  Current Admission Diagnosis:Acute appendicitis   Patient Active Problem List    Diagnosis Date Noted   • Acute appendicitis 2023   • Clinical sepsis ([address]) 2023   • Depression, recurrent ([address]) 2023   • Aspirin long-term use 2022   • GI bleed 2022   • Hypotension 2022   • Melena 2022   • Needs flu shot 2021   • Body mass index 34.0-34.9, adult 2021   • Obesity, morbid ([address]) 2021   • Dyslipidemia    • Diabetes mellitus, type 2 ([address])    • Colon cancer screening declined    • Dysthymic disorder 2020   • Body mass index 36.0-36.9, adult 2020   • Hypertension    • Mixed hyperlipidemia    • Essential hypertension, benign    • Uncontrolled type 2 diabetes mellitus with hyperglycemia ([address])    • Anxiety disorder       LOS (days): 2  Geometric Mean LOS (GMLOS) (days):   Days to GMLOS:     OBJECTIVE:  Risk of Unplanned Readmission Score: 7.16         Current admission status: Inpatient   Preferred Pharmacy:   [address] [place] [address] 71, [address] [address]  Phone: [phone] Fax: [phone] [place] [address]  Phone: [phone] Fax: [phone]    Primary Care Provider: Mercedez Bryant MD    Primary Insurance: [facility]  Secondary Insurance:     DISCHARGE DETAILS:    Discharge planning discussed with[de-identified] Patient  Freedom of Choice: Yes  Comments - Freedom of Choice: CM discussed discharge plans per care team recommendations - patient ambulating the hallways with oxygen. No HHC needs but aware he'll require oxygen.   CM contacted family/caregiver?: Yes  Were Treatment Team discharge recommendations reviewed with patient/caregiver?: Yes Were patient/caregiver advised of the risks associated with not following Treatment Team discharge recommendations?: Yes    Contacts  Patient Contacts: Patient  Relationship to Patient[de-identified] Other (Comment) (Self)  Contact Method: In Person  Reason/Outcome: Discharge [address]         Is the patient interested in [facility] at discharge?: No    DME Referral Provided  Referral made for DME?: Yes  DME referral completed for the following items[de-identified] Home Oxygen concentrator, Portable Oxygen tanks  DME Supplier Name[de-identified] [employer]    Other Referral/Resources/Interventions Provided:  Interventions: DME  Referral Comments: CM met with patient who reported he's independent with ADLs and ambulating. Patient drove to the hospital and aware he'll need oxygen at discharge. CM placed the oxygen order via parachute. Would you like to participate in our [address] service program?  : No - Declined    Treatment Team Recommendation: Home  Discharge Destination Plan[de-identified] Home  Transport at Discharge : Family         Patient reports although he drove to the hospital, his wife and son can come to the hospital to transport patient and return his car home at discharge.

## 2023-07-10 NOTE — ASSESSMENT & PLAN NOTE
POA with temp 102.3,  and O2 sat 85%  Source apeendicitis  On Ceftriaxone and flagyl  Afebrile and improving

## 2023-07-11 ENCOUNTER — TRANSITIONAL CARE MANAGEMENT (OUTPATIENT)
Dept: INTERNAL MEDICINE CLINIC | Facility: OTHER | Age: 68
End: 2023-07-11

## 2023-07-11 LAB

## 2023-07-11 PROCEDURE — 88304 TISSUE EXAM BY PATHOLOGIST: CPT | Performed by: PATHOLOGY

## 2023-07-11 NOTE — UTILIZATION REVIEW
NOTIFICATION OF INPATIENT ADMISSION   AUTHORIZATION REQUEST   SERVICING FACILITY:   [address] [place] [address]  Tax ID: [id number]  NPI: [id number]   ATTENDING PROVIDER:  Attending Name and NPI#: Debbi LoredoDenver velásquezchristen [[id number]]  Address: [address] [place] [address]  Phone: [phone]     ADMISSION INFORMATION:  Place of Service: Inpatient [address] [place] [address] Code: 21  Inpatient Admission Date/Time: 7/8/23  8:11 PM  Discharge Date/Time: 7/10/2023  4:21 PM  Admitting Diagnosis Code/Description:  Abdominal pain [R10.9]  Acute appendicitis with localized peritonitis, without perforation, abscess, or gangrene [K35.30]     UTILIZATION REVIEW CONTACT:  Buster Hou, Utilization   Network Utilization Review Department  Phone: [phone]  Fax: [phone]  Email: [email or web address]  Contact for approvals/pending authorizations, clinical reviews, and discharge. PHYSICIAN ADVISORY SERVICES:  Medical Necessity Denial & [id number] Review  Phone: [phone]  Fax: [phone]  Email: [email or web address]

## 2023-07-12 NOTE — PROGRESS NOTES
Called and spoke to patient. Had bowel movement yesterday afternoon. Pt has been logging sugars and will bring log tomorrow. Some readings are as follows  Tuesday 1030 , 3 , 5  (after no eating for some time)  Wednesday after he woke up 130.

## 2023-07-12 NOTE — DISCHARGE SUMMARY
Discharge Summary - Dominik Reed 76 y.o. male MRN: [id number]    Unit/Bed#: S -01 Encounter: [id number]    Admission Date: 2023   Discharge Date: 07/10/2023    Admitting Diagnosis:   Abdominal pain [R10.9]  Acute appendicitis with localized peritonitis, without perforation, abscess, or gangrene [K35.30]    Discharge Diagnoses: Principal Problem:    Acute appendicitis  Active Problems:    Clinical sepsis ([address])    Hypoxia      Consultations: Viv Nowak    Imagin23 CTAP:    IMPRESSION:  Acute uncomplicated appendicitis. 23 CXR: IMPRESSION:  No acute cardiopulmonary disease. Procedures Performed:  APPENDECTOMY LAPAROSCOPIC ([place])    HPI: Anitha Muñoz is a 76 y.o. male PMH HTN, DM, anxiety who presents with abdominal pain x2-3 days. Pt states RLQ pain began 2-3 days ago, associated with Tmax 100, chills. Denies any nausea, vomiting, cp, sob, changes to bowel or bladder habits. No prior abdominal sx in the past. Denies AC use. Prior smoker but has since quit. Last ate at 9:30 am.    Hospital Course: Anitha Muñoz is a 76 y.o. male who presented 2023 with acute appendicitis. The patient was take to the operating room on  for lap appy. The operation was uncomplicated however pt required bipap post operatively. SLIM and pulm were consulted post operatively given hypoxia requiring 3L NC. Suspected to be 2/2 undiagnosed COPD, significant smoking hx and known untreated JOSE. Pt was deemed stable for d/c on 7/10 with 3L NC on d/c. He was instructed to f/u with pulmonology as outpt for PFTs and sleep study. Pt was amenable for d/c and expressed understanding. On POD#2 pt was discharged. He was tolerating a regular diet, urinating and ambulating at baseline, and his pain was well controlled. D/c instructions and f/u information were provided at bedside prior to discharge. All questions answered.     Condition at Discharge: good     Discharge instructions/Information to patient and family:   See after visit summary for information provided to patient and family. Provisions for Follow-Up Care:  See after visit summary for information related to follow-up care and any pertinent home health orders. Disposition: Home    Planned Readmission: No    Discharge Statement   I spent 30 minutes discharging the patient. This time was spent on the day of discharge. I had direct contact with the patient on the day of discharge. Additional documentation is required if more than 30 minutes were spent on discharge. Discharge Medications:  See after visit summary for reconciled discharge medications provided to patient and family.

## 2023-07-13 ENCOUNTER — OFFICE VISIT (OUTPATIENT)
Dept: INTERNAL MEDICINE CLINIC | Facility: CLINIC | Age: 68
End: 2023-07-13
Payer: COMMERCIAL

## 2023-07-13 VITALS
OXYGEN SATURATION: 93 % | WEIGHT: 246 LBS | HEIGHT: 72 IN | BODY MASS INDEX: 33.32 KG/M2 | DIASTOLIC BLOOD PRESSURE: 76 MMHG | HEART RATE: 78 BPM | SYSTOLIC BLOOD PRESSURE: 130 MMHG | TEMPERATURE: 98.7 F

## 2023-07-13 DIAGNOSIS — I10 ESSENTIAL HYPERTENSION, BENIGN: ICD-10-CM

## 2023-07-13 DIAGNOSIS — E11.65 UNCONTROLLED TYPE 2 DIABETES MELLITUS WITH HYPERGLYCEMIA (HCC): ICD-10-CM

## 2023-07-13 DIAGNOSIS — K35.20 ACUTE APPENDICITIS WITH GENERALIZED PERITONITIS, WITHOUT ABSCESS, UNSPECIFIED WHETHER GANGRENE PRESENT, UNSPECIFIED WHETHER PERFORATION PRESENT: Primary | ICD-10-CM

## 2023-07-13 DIAGNOSIS — R06.83 SNORING: ICD-10-CM

## 2023-07-13 DIAGNOSIS — E78.2 MIXED HYPERLIPIDEMIA: ICD-10-CM

## 2023-07-13 PROCEDURE — 99496 TRANSJ CARE MGMT HIGH F2F 7D: CPT | Performed by: INTERNAL MEDICINE

## 2023-07-13 NOTE — PROGRESS NOTES
Transitional Care Management Review:  Denver Shock is a 76 y.o. male here for TCM follow up. During the TCM phone call patient stated:    TCM Call     Date and time call was made  7/11/2023 10:00 AM    Hospital care reviewed  Records reviewed    Patient was hospitialized at  [address] [place] [address]    Date of Admission  07/08/23    Date of discharge  07/10/23    Diagnosis  acute appendicitis    Disposition  Home    Current Symptoms  Incisional pain; Middle abdominal pain; Constipation      TCM Call     Post hospital issues  None    Scheduled for follow up? Yes    Did you obtain your prescribed medications  Yes    Do you need help managing your prescriptions or medications  No    Is transportation to your appointment needed  No    I have advised the patient to call PCP with any new or worsening symptoms  Estefani Quintanilla MD      Assessment/Plan:      Tobacco Cessation Counseling: Tobacco cessation counseling was provided. The patient is sincerely urged to quit consumption of tobacco. He is ready to quit tobacco.             1. Acute appendicitis with generalized peritonitis, without abscess, unspecified whether gangrene present, unspecified whether perforation present    2. Uncontrolled type 2 diabetes mellitus with hyperglycemia ([address])    3. Mixed hyperlipidemia    4. Essential hypertension, benign    5. Snoring           Subjective:      Patient ID: Denver Shock is a 76 y.o. male.     Follow-up from hospitalization, hospital record reviewed, doing well ,has appointment with the lung doctor      The following portions of the patient's history were reviewed and updated as appropriate: He  has a past medical history of Acquired hypothyroidism, Anxiety, Anxiety disorder, Cigarette smoker, Colon cancer screening declined, Diabetes mellitus ([address]), Diabetes mellitus, type 2 ([address]), Dyslipidemia, Essential hypertension, benign, Hyperlipidemia, Hypertension, Kidney stone, and Type II diabetes mellitus, uncontrolled. He   Patient Active Problem List    Diagnosis Date Noted   • Snoring 07/13/2023   • Hypoxia 07/10/2023   • Acute appendicitis 07/08/2023   • Clinical sepsis ([facility]) 07/08/2023   • Depression, recurrent ([facility]) 01/24/2023   • Aspirin long-term use 06/27/2022   • GI bleed 06/26/2022   • Hypotension 06/26/2022   • Melena 06/26/2022   • Needs flu shot 09/17/2021   • Body mass index 34.0-34.9, adult 01/12/2021   • Obesity, morbid ([facility]) 01/12/2021   • Dyslipidemia    • Diabetes mellitus, type 2 ([address] [place] [address])    • Colon cancer screening declined    • Dysthymic disorder 09/16/2020   • Body mass index 36.0-36.9, adult 09/16/2020   • Hypertension    • Mixed hyperlipidemia    • Essential hypertension, benign    • Uncontrolled type 2 diabetes mellitus with hyperglycemia (HCC)    • Anxiety disorder      He  has a past surgical history that includes Kidney stone surgery; Cardiac catheterization; and APPENDECTOMY LAPAROSCOPIC (N/A, 7/8/2023). His family history includes Heart attack in his father; Heart disease in his father. He  reports that he has been smoking cigarettes. He has a 2.50 pack-year smoking history. He has never used smokeless tobacco. He reports that he does not currently use alcohol after a past usage of about 1.0 standard drink of alcohol per week. He reports current drug use. Drug: Marijuana.   Current Outpatient Medications   Medication Sig Dispense Refill   • albuterol (Proventil HFA) 90 mcg/act inhaler Inhale 2 puffs every 6 (six) hours as needed for wheezing 6.7 g 0   • Blood Glucose Monitoring Suppl (ONE TOUCH ULTRA 2) w/Device KIT CONTOUR METER, USE 3 TIMES A DAY E11.65 1 kit 0   • dulaglutide (Trulicity) 1.5 UD/0.4SU injection Inject 0.5 mL (1.5 mg total) under the skin once a week 4 mL 3   • glucose blood (OneTouch Ultra) test strip Use as instructed 100 strip 0   • Lancets MISC Use 3 (three) times a day Contour lancets E11.65 100 each 0   • lisinopril (ZESTRIL) 5 mg tablet Take 1 tablet (5 mg total) by mouth daily 90 tablet 1   • metFORMIN (GLUCOPHAGE) 1000 MG tablet TAKE 1 TABLET BY MOUTH TWICE A DAY WITH MEALS (Patient taking differently: Take 1,000 mg by mouth 2 (two) times a day) 180 tablet 0   • patient supplied medication Multi-Betic vitamin       • simvastatin (ZOCOR) 20 mg tablet Take 1 tablet (20 mg total) by mouth daily at bedtime 90 tablet 1   • acetaminophen (TYLENOL) 500 mg tablet Take 1 tablet (500 mg total) by mouth every 6 (six) hours as needed for mild pain (Patient not taking: Reported on 7/13/2023)  0   • ALPRAZolam (XANAX) 0.5 mg tablet Take 1 tablet (0.5 mg total) by mouth every 6 (six) hours as needed for anxiety (Patient not taking: Reported on 7/13/2023) 5 tablet 0     No current facility-administered medications for this visit. He has No Known Allergies. .    Review of Systems   Constitutional: Negative for chills and fever. HENT: Negative for congestion, ear pain and sore throat. Eyes: Negative for pain. Respiratory: Negative for cough and shortness of breath. Cardiovascular: Negative for chest pain and leg swelling. Gastrointestinal: Positive for abdominal pain. Negative for nausea and vomiting. Endocrine: Negative for polyuria. Genitourinary: Negative for difficulty urinating, frequency and urgency. Musculoskeletal: Negative for arthralgias and back pain. Skin: Negative for rash. Neurological: Negative for weakness and headaches. Psychiatric/Behavioral: Negative for sleep disturbance. The patient is not nervous/anxious.           Objective:      /76 (BP Location: Left arm, Patient Position: Sitting, Cuff Size: Standard)   Pulse 78   Temp 98.7 °F (37.1 °C) (Tympanic)   Ht 6' (1.829 m)   Wt 112 kg (246 lb)   SpO2 93%   BMI 33.36 kg/m²     Recent Results (from the past 336 hour(s))   CBC and differential    Collection Time: 07/08/23  2:42 PM   Result Value Ref Range    WBC 15.63 (H) 4.31 - 10.16 Thousand/uL    RBC 5.02 3.88 - 5.62 Million/uL    Hemoglobin 16.5 12.0 - 17.0 g/dL    Hematocrit 49.8 (H) 36.5 - 49.3 %    MCV 99 (H) 82 - 98 fL    MCH 32.9 26.8 - 34.3 pg    MCHC 33.1 31.4 - 37.4 g/dL    RDW 12.7 11.6 - 15.1 %    MPV 10.1 8.9 - 12.7 fL    Platelets 745 913 - 963 Thousands/uL    nRBC 0 /100 WBCs    Neutrophils Relative 74 43 - 75 %    Immat GRANS % 1 0 - 2 %    Lymphocytes Relative 13 (L) 14 - 44 %    Monocytes Relative 11 4 - 12 %    Eosinophils Relative 1 0 - 6 %    Basophils Relative 0 0 - 1 %    Neutrophils Absolute 11.60 (H) 1.85 - 7.62 Thousands/µL    Immature Grans Absolute 0.08 0.00 - 0.20 Thousand/uL    Lymphocytes Absolute 2.08 0.60 - 4.47 Thousands/µL    Monocytes Absolute 1.67 (H) 0.17 - 1.22 Thousand/µL    Eosinophils Absolute 0.14 0.00 - 0.61 Thousand/µL    Basophils Absolute 0.06 0.00 - 0.10 Thousands/µL   Comprehensive metabolic panel    Collection Time: 07/08/23  2:42 PM   Result Value Ref Range    Sodium 138 135 - 147 mmol/L    Potassium 4.4 3.5 - 5.3 mmol/L    Chloride 103 96 - 108 mmol/L    CO2 29 21 - 32 mmol/L    ANION GAP 6 mmol/L    BUN 20 5 - 25 mg/dL    Creatinine 0.90 0.60 - 1.30 mg/dL    Glucose 125 65 - 140 mg/dL    Calcium 9.6 8.4 - 10.2 mg/dL    AST 17 13 - 39 U/L    ALT 22 7 - 52 U/L    Alkaline Phosphatase 37 34 - 104 U/L    Total Protein 6.6 6.4 - 8.4 g/dL    Albumin 4.4 3.5 - 5.0 g/dL    Total Bilirubin 0.78 0.20 - 1.00 mg/dL    eGFR 87 ml/min/1.73sq m   Lipase    Collection Time: 07/08/23  2:42 PM   Result Value Ref Range    Lipase 23 11 - 82 u/L   UA w Reflex to Microscopic w Reflex to Culture    Collection Time: 07/08/23  5:57 PM    Specimen: Urine, Clean Catch   Result Value Ref Range    Color, UA Yellow     Clarity, UA Turbid     Specific Gravity, UA >1.030 (H) 1.003 - 1.030    pH, UA 8.5 (A) 4.5, 5.0, 5.5, 6.0, 6.5, 7.0, 7.5, 8.0    Leukocytes, UA Negative Negative    Nitrite, UA Negative Negative    Protein, UA 30 (1+) (A) Negative mg/dl    Glucose, UA Negative Negative mg/dl    Ketones, UA Negative Negative mg/dl    Urobilinogen, UA <2.0 <2.0 mg/dl mg/dl    Bilirubin, UA Negative Negative    Occult Blood, UA Negative Negative   Urine Microscopic    Collection Time: 07/08/23  5:57 PM   Result Value Ref Range    RBC, UA 0-1 None Seen, 0-1, 1-2, 2-4, 0-5 /hpf    WBC, UA 0-1 None Seen, 0-1, 1-2, 0-5, 2-4 /hpf    Epithelial Cells None Seen None Seen, Occasional /hpf    Bacteria, UA None Seen None Seen, Occasional /hpf   Tissue Exam    Collection Time: 07/08/23  7:36 PM   Result Value Ref Range    Case Report       Surgical Pathology Report                         Case: [id number]                                   Authorizing Provider:  Antonio Nash MD     Collected:           07/08/2023 1936              Ordering Location:     [facility]        Received:            07/09/2023 [address] [place] [address] Operating Room                                                      Pathologist:           Harry Callejas MD                                                                Specimen:    Appendix                                                                                   Final Diagnosis       A. Appendix (appendectomy):     - Acute appendicitis and serositis; negative for malignancy. Additional Information       All reported additional testing was performed with appropriately reactive controls. These tests were developed and their performance characteristics determined by [facility] or appropriate performing facility, though some tests may be performed on tissues which have not been validated for performance characteristics (such as staining performed on alcohol exposed cell blocks and decalcified tissues). Results should be interpreted with caution and in the context of the patients’ clinical condition. These tests may not be cleared or approved by the U.S.  Food and Drug Administration, though the FDA has determined that such clearance or approval is not necessary. These tests are used for clinical purposes and they should not be regarded as investigational or for research. This laboratory has been approved by CLIA 88, designated as a high-complexity laboratory and is qualified to perform these tests. Interpretation performed at [facility], [address] [place] [address] First Description       A. The specimen is received in formalin, labeled with the patient's name and hospital number, and is designated " appendix". The specimen consists of a vermiform appendix measuring 9 cm in length by 1 cm in average diameter. The serosa is tan-purple, partially smooth and glistening, partially covered by hemorrhagic material and a tan exudate, exhibiting multiple areas of bulging. No perforations are seen. The margin of resection is inked blue and is included in the submitted sections. The lumen contains hemorrhagic material.  The appendiceal wall averages 0.2 cm in thickness and exhibits multiple diverticula (corresponding to the serosal bulging). Representative sections. Two cassettes. Note: The estimated total formalin fixation time based upon information provided by the submitting clinician and the standard processing schedule is under 72 hours.     MCrites     Fingerstick Glucose (POCT)    Collection Time: 07/08/23  9:04 PM   Result Value Ref Range    POC Glucose 173 (H) 65 - 140 mg/dl   Fingerstick Glucose (POCT)    Collection Time: 07/09/23 12:05 AM   Result Value Ref Range    POC Glucose 193 (H) 65 - 140 mg/dl   CBC and differential    Collection Time: 07/09/23  5:15 AM   Result Value Ref Range    WBC 14.39 (H) 4.31 - 10.16 Thousand/uL    RBC 4.34 3.88 - 5.62 Million/uL    Hemoglobin 14.3 12.0 - 17.0 g/dL    Hematocrit 43.7 36.5 - 49.3 %     (H) 82 - 98 fL    MCH 32.9 26.8 - 34.3 pg    MCHC 32.7 31.4 - 37.4 g/dL    RDW 12.6 11.6 - 15.1 %    MPV 10.1 8.9 - 12.7 fL    Platelets 458 490 - 170 Thousands/uL    nRBC 0 /100 WBCs    Neutrophils Relative 84 (H) 43 - 75 %    Immat GRANS % 1 0 - 2 %    Lymphocytes Relative 11 (L) 14 - 44 %    Monocytes Relative 4 4 - 12 %    Eosinophils Relative 0 0 - 6 %    Basophils Relative 0 0 - 1 %    Neutrophils Absolute 12.16 (H) 1.85 - 7.62 Thousands/µL    Immature Grans Absolute 0.07 0.00 - 0.20 Thousand/uL    Lymphocytes Absolute 1.53 0.60 - 4.47 Thousands/µL    Monocytes Absolute 0.60 0.17 - 1.22 Thousand/µL    Eosinophils Absolute 0.00 0.00 - 0.61 Thousand/µL    Basophils Absolute 0.03 0.00 - 0.10 Thousands/µL   Basic metabolic panel    Collection Time: 07/09/23  5:15 AM   Result Value Ref Range    Sodium 136 135 - 147 mmol/L    Potassium 4.4 3.5 - 5.3 mmol/L    Chloride 105 96 - 108 mmol/L    CO2 25 21 - 32 mmol/L    ANION GAP 6 mmol/L    BUN 18 5 - 25 mg/dL    Creatinine 0.84 0.60 - 1.30 mg/dL    Glucose 213 (H) 65 - 140 mg/dL    Calcium 8.5 8.4 - 10.2 mg/dL    eGFR 89 ml/min/1.73sq m   Fingerstick Glucose (POCT)    Collection Time: 07/09/23  5:41 AM   Result Value Ref Range    POC Glucose 216 (H) 65 - 140 mg/dl   Fingerstick Glucose (POCT)    Collection Time: 07/09/23 11:30 AM   Result Value Ref Range    POC Glucose 182 (H) 65 - 140 mg/dl   Fingerstick Glucose (POCT)    Collection Time: 07/09/23  5:32 PM   Result Value Ref Range    POC Glucose 142 (H) 65 - 140 mg/dl   Fingerstick Glucose (POCT)    Collection Time: 07/09/23  9:26 PM   Result Value Ref Range    POC Glucose 121 65 - 140 mg/dl   Basic metabolic panel    Collection Time: 07/10/23  4:45 AM   Result Value Ref Range    Sodium 138 135 - 147 mmol/L    Potassium 4.2 3.5 - 5.3 mmol/L    Chloride 108 96 - 108 mmol/L    CO2 24 21 - 32 mmol/L    ANION GAP 6 mmol/L    BUN 24 5 - 25 mg/dL    Creatinine 0.84 0.60 - 1.30 mg/dL    Glucose 131 65 - 140 mg/dL    Calcium 8.4 8.4 - 10.2 mg/dL    eGFR 89 ml/min/1.73sq m   CBC    Collection Time: 07/10/23  4:45 AM   Result Value Ref Range    WBC 12.40 (H) 4.31 - 10.16 Thousand/uL    RBC 4.19 3.88 - 5.62 Million/uL    Hemoglobin 13.7 12.0 - 17.0 g/dL    Hematocrit 42.5 36.5 - 49.3 %     (H) 82 - 98 fL    MCH 32.7 26.8 - 34.3 pg    MCHC 32.2 31.4 - 37.4 g/dL    RDW 12.8 11.6 - 15.1 %    Platelets 531 794 - 940 Thousands/uL    MPV 10.4 8.9 - 12.7 fL   B-Type Natriuretic Peptide(BNP)    Collection Time: 07/10/23  4:45 AM   Result Value Ref Range    BNP 95 0 - 100 pg/mL   Fingerstick Glucose (POCT)    Collection Time: 07/10/23  7:35 AM   Result Value Ref Range    POC Glucose 124 65 - 140 mg/dl   Home O2 Setup    Collection Time: 07/10/23  9:29 AM   Result Value Ref Range    Supplier Name [employer] [address]     Supplier Phone Number [phone]     Order Status Delivery Successful     Delivery Note      Delivery Request Date 07/10/2023     Date Delivered  07/11/2023     Supplier Name 07/11/2023     Item Description       Home Oxygen Concentrator with Portability, Adult, Standard Liter Flow    Item Description Portable Gaseous Oxygen System     Item Description Portable O2 Contents, Gas     Item Description No Conserving Device     Item Description O2 Humidifier Bottle, Standard Liter Flow    Fingerstick Glucose (POCT)    Collection Time: 07/10/23 11:57 AM   Result Value Ref Range    POC Glucose 134 65 - 140 mg/dl   Fingerstick Glucose (POCT)    Collection Time: 07/10/23  3:58 PM   Result Value Ref Range    POC Glucose 138 65 - 140 mg/dl        Physical Exam  Constitutional:       Appearance: Normal appearance. HENT:      Head: Normocephalic. Right Ear: External ear normal.      Left Ear: External ear normal.      Nose: Nose normal. No congestion. Mouth/Throat:      Mouth: Mucous membranes are moist.      Pharynx: Oropharynx is clear. No oropharyngeal exudate or posterior oropharyngeal erythema. Eyes:      Extraocular Movements: Extraocular movements intact. Conjunctiva/sclera: Conjunctivae normal.   Cardiovascular:      Rate and Rhythm: Normal rate and regular rhythm. Heart sounds: Normal heart sounds. No murmur heard. Pulmonary:      Effort: Pulmonary effort is normal.      Breath sounds: Normal breath sounds. No wheezing or rales. Abdominal:      General: Bowel sounds are normal. There is no distension. Palpations: Abdomen is soft. Tenderness: There is no abdominal tenderness. Musculoskeletal:         General: Normal range of motion. Cervical back: Normal range of motion and neck supple. Right lower leg: No edema. Left lower leg: No edema. Lymphadenopathy:      Cervical: No cervical adenopathy. Skin:     General: Skin is warm. Neurological:      General: No focal deficit present. Mental Status: He is alert and oriented to person, place, and time.

## 2023-07-13 NOTE — ED ATTENDING ATTESTATION
7/8/2023  IKera MD, saw and evaluated the patient. I have discussed the patient with the resident/non-physician practitioner and agree with the resident's/non-physician practitioner's findings, Plan of Care, and MDM as documented in the resident's/non-physician practitioner's note, except where noted. All available labs and Radiology studies were reviewed. I was present for key portions of any procedure(s) performed by the resident/non-physician practitioner and I was immediately available to provide assistance. At this point I agree with the current assessment done in the Emergency Department. I have conducted an independent evaluation of this patient a history and physical is as follows:    77 yo male with gradual onset RLQ pain, exam without s/s peritonitis, labs reassuring, pt afebrile, CT with acute uncomplicated appendicitis. Pt admitted to surgery. HD stable throughout. ED Course  ED Course as of 07/13/23 0017   Sat Jul 08, 2023   1816 IMPRESSION:     Acute uncomplicated appendicitis.            Workstation performed: [id number]            Critical Care Time  Procedures

## 2023-07-17 ENCOUNTER — OFFICE VISIT (OUTPATIENT)
Dept: PULMONOLOGY | Facility: CLINIC | Age: 68
End: 2023-07-17
Payer: COMMERCIAL

## 2023-07-17 VITALS
OXYGEN SATURATION: 94 % | WEIGHT: 248 LBS | TEMPERATURE: 98.5 F | SYSTOLIC BLOOD PRESSURE: 118 MMHG | DIASTOLIC BLOOD PRESSURE: 68 MMHG | BODY MASS INDEX: 33.59 KG/M2 | HEART RATE: 90 BPM | HEIGHT: 72 IN

## 2023-07-17 DIAGNOSIS — E66.09 CLASS 1 OBESITY DUE TO EXCESS CALORIES WITHOUT SERIOUS COMORBIDITY WITH BODY MASS INDEX (BMI) OF 33.0 TO 33.9 IN ADULT: ICD-10-CM

## 2023-07-17 DIAGNOSIS — E11.65 UNCONTROLLED TYPE 2 DIABETES MELLITUS WITH HYPERGLYCEMIA (HCC): ICD-10-CM

## 2023-07-17 DIAGNOSIS — G47.33 OSA (OBSTRUCTIVE SLEEP APNEA): ICD-10-CM

## 2023-07-17 DIAGNOSIS — J44.9 CHRONIC OBSTRUCTIVE PULMONARY DISEASE, UNSPECIFIED COPD TYPE (HCC): Primary | ICD-10-CM

## 2023-07-17 DIAGNOSIS — F17.211 NICOTINE DEPENDENCE, CIGARETTES, IN REMISSION: ICD-10-CM

## 2023-07-17 DIAGNOSIS — R06.09 DOE (DYSPNEA ON EXERTION): ICD-10-CM

## 2023-07-17 PROCEDURE — 99215 OFFICE O/P EST HI 40 MIN: CPT | Performed by: INTERNAL MEDICINE

## 2023-07-17 PROCEDURE — 94010 BREATHING CAPACITY TEST: CPT

## 2023-07-17 PROCEDURE — 94618 PULMONARY STRESS TESTING: CPT

## 2023-07-17 RX ORDER — UMECLIDINIUM BROMIDE AND VILANTEROL TRIFENATATE 62.5; 25 UG/1; UG/1
1 POWDER RESPIRATORY (INHALATION) DAILY
Qty: 60 BLISTER | Refills: 5 | Status: SHIPPED | OUTPATIENT
Start: 2023-07-17 | End: 2023-08-16

## 2023-07-17 RX ORDER — UMECLIDINIUM BROMIDE AND VILANTEROL TRIFENATATE 62.5; 25 UG/1; UG/1
1 POWDER RESPIRATORY (INHALATION) DAILY
Qty: 60 BLISTER | Refills: 0 | Status: SHIPPED | OUTPATIENT
Start: 2023-07-17 | End: 2023-07-17

## 2023-07-17 NOTE — PROGRESS NOTES
Office Progress Note - Pulmonary    Dominik Reed 76 y.o. male MRN: [id number]    Encounter: [id number]      Assessment:  • Chronic obstructive pulmonary disease. • Dyspnea on exertion. • Obstructive sleep apnea. • History of tobacco use. • Obesity    Plan:   • Spirometry. • 6-minute walk test   • Anoro Ellipta 1 inhalation once a day. • Albuterol rescue inhaler 2 inhalations 4 times a day as needed. • Oxygen supplement at night. • Diagnostic polysomnogram.    • Weight loss. • Follow-up in 2 months. Discussion:   The patient's dyspnea on exertion is due to moderate COPD. It is evident on the spirometry done today even though his effort was suboptimal.  He did not desaturate on the 6-minute walk test.  However the patient has symptoms consistent with obstructive sleep apnea. I advised him to continue to use it at night until the sleep study is done. I have started him on an oral Ellipta 1 inhalation once a day. He will use albuterol rescue inhaler 2 inhalations 4 times a day as needed. He has symptoms consistent with obstructive sleep apnea. I have ordered a diagnostic polysomnogram.  I will see him in 2 months. Subjective: The patient is here for a follow-up visit. He was seen in consultation in the hospital.  He was discharged home on home O2. He quit smoking since then. His breathing is improving. He is using the oxygen only at night. He snores and has witnessed apneas by his family. He feels tired during the day. Denies any significant cough, wheezing or sputum production. He is rarely needing to use the albuterol rescue inhaler. Review of systems:  A 12 point system review is done and aside from what is stated above the rest of the review of systems is negative. Family history and social history are reviewed. Medications list is reviewed. Vitals: Blood pressure 118/68, pulse 90, temperature 98.5 °F (36.9 °C), height 6' (1.829 m), weight 112 kg (248 lb), SpO2 94 %. , Physical Exam  Gen: Awake, alert, oriented x 3, no acute distress  HEENT: Mucous membranes moist, no oral lesions, no thrush  NECK: No accessory muscle use, JVP not elevated  Cardiac: Regular, single S1, single S2, no murmurs, no rubs, no gallops  Lungs: Decreased breath sounds. No wheezing or rhonchi. Abdomen: normoactive bowel sounds, soft nontender, nondistended, no rebound or rigidity, no guarding  Extremities: no cyanosis, no clubbing, no edema  Neuro:  Grossly nonfocal.  Skin:  No rash. Chest x-rays reviewed on the PACS system. No acute or chronic pulmonary findings. Lab Results   Component Value Date    WBC 12.40 (H) 07/10/2023    HGB 13.7 07/10/2023    HCT 42.5 07/10/2023     (H) 07/10/2023     07/10/2023     Lab Results   Component Value Date    SODIUM 138 07/10/2023    K 4.2 07/10/2023     07/10/2023    CO2 24 07/10/2023    BUN 24 07/10/2023    CREATININE 0.84 07/10/2023    GLUC 131 07/10/2023    CALCIUM 8.4 07/10/2023     Spirometry done today in the office. The patient gave a suboptimal effort. However it still showed moderate airflow limitation. 6-minute walk test was done today in the office. The patient started with a heart rate of 82 bpm and O2 saturation 97%. At the end of the test the heart rate was 103 bpm and O2 saturation 91%. He walked 162 m.

## 2023-07-20 DIAGNOSIS — I10 ESSENTIAL HYPERTENSION, BENIGN: ICD-10-CM

## 2023-07-20 DIAGNOSIS — E78.5 HYPERLIPIDEMIA, UNSPECIFIED: ICD-10-CM

## 2023-07-20 DIAGNOSIS — F41.9 ANXIETY DISORDER, UNSPECIFIED: ICD-10-CM

## 2023-07-20 DIAGNOSIS — E11.65 UNCONTROLLED TYPE 2 DIABETES MELLITUS WITH HYPERGLYCEMIA (HCC): ICD-10-CM

## 2023-07-20 RX ORDER — LISINOPRIL 5 MG/1
5 TABLET ORAL DAILY
Qty: 90 TABLET | Refills: 1 | OUTPATIENT
Start: 2023-07-20

## 2023-07-20 RX ORDER — ALPRAZOLAM 0.5 MG/1
0.5 TABLET ORAL EVERY 6 HOURS PRN
Qty: 5 TABLET | Refills: 0 | Status: SHIPPED | OUTPATIENT
Start: 2023-07-20

## 2023-07-20 RX ORDER — ALPRAZOLAM 0.5 MG/1
0.5 TABLET ORAL EVERY 6 HOURS PRN
Qty: 5 TABLET | Refills: 0 | OUTPATIENT
Start: 2023-07-20

## 2023-07-20 RX ORDER — LISINOPRIL 5 MG/1
5 TABLET ORAL DAILY
Qty: 90 TABLET | Refills: 1 | Status: SHIPPED | OUTPATIENT
Start: 2023-07-20 | End: 2023-07-20 | Stop reason: SDUPTHER

## 2023-07-20 RX ORDER — LISINOPRIL 5 MG/1
5 TABLET ORAL DAILY
Qty: 90 TABLET | Refills: 1 | Status: SHIPPED | OUTPATIENT
Start: 2023-07-20

## 2023-07-20 RX ORDER — SIMVASTATIN 20 MG
20 TABLET ORAL
Qty: 90 TABLET | Refills: 1 | OUTPATIENT
Start: 2023-07-20

## 2023-07-20 RX ORDER — LISINOPRIL 5 MG/1
5 TABLET ORAL DAILY
Qty: 90 TABLET | Refills: 0 | Status: CANCELLED | OUTPATIENT
Start: 2023-07-20

## 2023-07-20 RX ORDER — SIMVASTATIN 20 MG
20 TABLET ORAL
Qty: 90 TABLET | Refills: 1 | Status: SHIPPED | OUTPATIENT
Start: 2023-07-20

## 2023-07-20 RX ORDER — ALPRAZOLAM 0.5 MG/1
0.5 TABLET ORAL EVERY 6 HOURS PRN
Qty: 5 TABLET | Refills: 0 | Status: CANCELLED | OUTPATIENT
Start: 2023-07-20

## 2023-07-20 RX ORDER — SIMVASTATIN 20 MG
20 TABLET ORAL
Qty: 90 TABLET | Refills: 0 | Status: CANCELLED | OUTPATIENT
Start: 2023-07-20

## 2023-07-24 ENCOUNTER — OFFICE VISIT (OUTPATIENT)
Dept: SURGERY | Facility: CLINIC | Age: 68
End: 2023-07-24

## 2023-07-24 DIAGNOSIS — K35.20 ACUTE APPENDICITIS WITH GENERALIZED PERITONITIS, WITHOUT ABSCESS, UNSPECIFIED WHETHER GANGRENE PRESENT, UNSPECIFIED WHETHER PERFORATION PRESENT: Primary | ICD-10-CM

## 2023-07-24 PROCEDURE — 99024 POSTOP FOLLOW-UP VISIT: CPT | Performed by: SURGERY

## 2023-07-24 NOTE — PROGRESS NOTES
Assessment/Plan: Patient is status post appendectomy. He feels well. Offers no complaints. Incisions are clean and healing well. All questions answered. There are no diagnoses linked to this encounter. Pathology: Reviewed with patient, all questions answered. Postoperative restrictions reviewed. All questions answered. ______________________________________________________  HPI: Patient presents post operatively. Laparoscopic appendectomy 7/8/2023   Final Diagnosis  A. Appendix (appendectomy):     - Acute appendicitis and serositis; negative for malignancy. ROS:  General ROS: negative for - chills, fatigue, fever or night sweats, weight loss  Respiratory ROS: no cough, shortness of breath, or wheezing  Cardiovascular ROS: no chest pain or dyspnea on exertion  Genito-Urinary ROS: no dysuria, trouble voiding, or hematuria  Musculoskeletal ROS: negative for - gait disturbance, joint pain or muscle pain  Neurological ROS: no TIA or stroke symptoms  GI ROS: see HPI  Skin ROS: no new rashes or lesions   Lymphatic ROS: no new adenopathy noted by pt. GYN ROS: see HPI, no new GYN history or bleeding noted  Psy ROS: no new mental or behavioral disturbances         Patient Active Problem List   Diagnosis   • Hypertension   • Mixed hyperlipidemia   • Essential hypertension, benign   • Uncontrolled type 2 diabetes mellitus with hyperglycemia (HCC)   • Anxiety disorder   • Dysthymic disorder   • Body mass index 36.0-36.9, adult   • Dyslipidemia   • Diabetes mellitus, type 2 (HCC)   • Colon cancer screening declined   • Body mass index 34.0-34.9, adult   • Obesity, morbid (HCC)   • Needs flu shot   • GI bleed   • Hypotension   • Melena   • Aspirin long-term use   • Depression, recurrent (HCC)   • Acute appendicitis   • Clinical sepsis (HCC)   • Hypoxia   • Snoring       Allergies:  Patient has no known allergies.       Current Outpatient Medications:   •  acetaminophen (TYLENOL) 500 mg tablet, Take 1 tablet (500 mg total) by mouth every 6 (six) hours as needed for mild pain (Patient not taking: Reported on 7/13/2023), Disp: , Rfl: 0  •  albuterol (Proventil HFA) 90 mcg/act inhaler, Inhale 2 puffs every 6 (six) hours as needed for wheezing, Disp: 6.7 g, Rfl: 0  •  ALPRAZolam (XANAX) 0.5 mg tablet, Take 1 tablet (0.5 mg total) by mouth every 6 (six) hours as needed for anxiety, Disp: 5 tablet, Rfl: 0  •  Blood Glucose Monitoring Suppl (ONE TOUCH ULTRA 2) w/Device KIT, CONTOUR METER, USE 3 TIMES A DAY E11.65, Disp: 1 kit, Rfl: 0  •  dulaglutide (Trulicity) 1.5 WJ/6.6LU injection, Inject 0.5 mL (1.5 mg total) under the skin once a week, Disp: 4 mL, Rfl: 0  •  glucose blood (OneTouch Ultra) test strip, Use as instructed, Disp: 100 strip, Rfl: 0  •  Lancets MISC, Use 3 (three) times a day Contour lancets E11.65, Disp: 100 each, Rfl: 0  •  lisinopril (ZESTRIL) 5 mg tablet, Take 1 tablet (5 mg total) by mouth daily, Disp: 90 tablet, Rfl: 1  •  metFORMIN (GLUCOPHAGE) 1000 MG tablet, Take 1 tablet (1,000 mg total) by mouth 2 (two) times a day with meals, Disp: 180 tablet, Rfl: 0  •  patient supplied medication, Multi-Betic vitamin  , Disp: , Rfl:   •  simvastatin (ZOCOR) 20 mg tablet, Take 1 tablet (20 mg total) by mouth daily at bedtime, Disp: 90 tablet, Rfl: 1  •  umeclidinium-vilanterol (Anoro Ellipta) 62.5-25 mcg/actuation inhaler, Inhale 1 puff daily, Disp: 60 blister, Rfl: 5    Past Medical History:   Diagnosis Date   • Acquired hypothyroidism    • Anxiety    • Anxiety disorder    • Cigarette smoker    • Colon cancer screening declined     does understand the risk of missing colon cancer - As per [employer]   • Diabetes mellitus ([address])    • Diabetes mellitus, type 2 ([address])    • Dyslipidemia    • Essential hypertension, benign    • Hyperlipidemia    • Hypertension    • Kidney stone    • Type II diabetes mellitus, uncontrolled        Past Surgical History:   Procedure Laterality Date   • APPENDECTOMY LAPAROSCOPIC N/A 7/8/2023    Procedure: APPENDECTOMY LAPAROSCOPIC;  Surgeon: Leona Price MD;  Location: [facility];  Service: General   • CARDIAC CATHETERIZATION      1/19/09 no obstructive- Dr. Pari Cabrera   • [address]      ? 2006 s/p stone removal         Family History   Problem Relation Age of Onset   • Heart disease Father    • Heart attack Father         reports that he has been smoking cigarettes. He started smoking about 44 years ago. He has a 72.00 pack-year smoking history. He has never used smokeless tobacco. He reports that he does not currently use alcohol after a past usage of about 1.0 standard drink of alcohol per week. He reports current drug use. Drug: Marijuana. PHYSICAL EXAM    There were no vitals taken for this visit.     General: normal, cooperative, no distress  Abdominal: soft, nondistended or nontender  Incision: clean, dry, and intact and healing well      Leona Price MD    Date: 7/24/2023 Time: 10:18 AM

## 2023-08-04 ENCOUNTER — VBI (OUTPATIENT)
Dept: ADMINISTRATIVE | Facility: OTHER | Age: 68
End: 2023-08-04

## 2023-09-12 ENCOUNTER — RA CDI HCC (OUTPATIENT)
Dept: OTHER | Facility: HOSPITAL | Age: 68
End: 2023-09-12

## 2023-09-12 NOTE — PROGRESS NOTES
[address] coding opportunities       Chart reviewed, no opportunity found: [address] Review     Patients Insurance     Medicare Insurance: [employer]

## 2023-09-13 ENCOUNTER — RA CDI HCC (OUTPATIENT)
Dept: OTHER | Facility: HOSPITAL | Age: 68
End: 2023-09-13

## 2023-09-13 NOTE — PROGRESS NOTES
[facility] [address] [facility] coding opportunities       Chart reviewed, no opportunity found: CHART REVIEWED, [facility]     Patients Insurance     Medicare Insurance: [employer]

## 2023-09-15 ENCOUNTER — TELEPHONE (OUTPATIENT)
Dept: INTERNAL MEDICINE CLINIC | Facility: CLINIC | Age: 68
End: 2023-09-15

## 2023-09-15 DIAGNOSIS — E11.65 UNCONTROLLED TYPE 2 DIABETES MELLITUS WITH HYPERGLYCEMIA (HCC): Primary | ICD-10-CM

## 2023-09-15 NOTE — TELEPHONE ENCOUNTER
Looks like he did blood test on 5/19/2023, he needs to wait for 4 months to do the blood test and urine test repeat, can you try to change his appointment from 9/19/2023  to 1 week after, so he can do blood test after 9/18/2023, and I should have report by the time he comes in to see me and we can discuss the report, order placed for blood and urine test, please let him know

## 2023-09-17 ENCOUNTER — APPOINTMENT (OUTPATIENT)
Dept: LAB | Facility: CLINIC | Age: 68
End: 2023-09-17
Payer: COMMERCIAL

## 2023-09-17 DIAGNOSIS — E11.65 UNCONTROLLED TYPE 2 DIABETES MELLITUS WITH HYPERGLYCEMIA (HCC): ICD-10-CM

## 2023-09-17 LAB
ALBUMIN SERPL BCP-MCNC: 4.3 G/DL (ref 3.5–5)
ALP SERPL-CCNC: 37 U/L (ref 34–104)
ALT SERPL W P-5'-P-CCNC: 25 U/L (ref 7–52)
ANION GAP SERPL CALCULATED.3IONS-SCNC: 8 MMOL/L
AST SERPL W P-5'-P-CCNC: 21 U/L (ref 13–39)
BASOPHILS # BLD AUTO: 0.05 THOUSANDS/ÂΜL (ref 0–0.1)
BASOPHILS NFR BLD AUTO: 1 % (ref 0–1)
BILIRUB SERPL-MCNC: 0.68 MG/DL (ref 0.2–1)
BUN SERPL-MCNC: 20 MG/DL (ref 5–25)
CALCIUM SERPL-MCNC: 8.8 MG/DL (ref 8.4–10.2)
CHLORIDE SERPL-SCNC: 105 MMOL/L (ref 96–108)
CHOLEST SERPL-MCNC: 134 MG/DL
CO2 SERPL-SCNC: 24 MMOL/L (ref 21–32)
CREAT SERPL-MCNC: 0.95 MG/DL (ref 0.6–1.3)
CREAT UR-MCNC: 142 MG/DL
EOSINOPHIL # BLD AUTO: 0.33 THOUSAND/ÂΜL (ref 0–0.61)
EOSINOPHIL NFR BLD AUTO: 3 % (ref 0–6)
ERYTHROCYTE [DISTWIDTH] IN BLOOD BY AUTOMATED COUNT: 12.1 % (ref 11.6–15.1)
EST. AVERAGE GLUCOSE BLD GHB EST-MCNC: 146 MG/DL
GFR SERPL CREATININE-BSD FRML MDRD: 81 ML/MIN/1.73SQ M
GLUCOSE P FAST SERPL-MCNC: 120 MG/DL (ref 65–99)
HBA1C MFR BLD: 6.7 %
HCT VFR BLD AUTO: 52.6 % (ref 36.5–49.3)
HDLC SERPL-MCNC: 40 MG/DL
HGB BLD-MCNC: 17.4 G/DL (ref 12–17)
IMM GRANULOCYTES # BLD AUTO: 0.02 THOUSAND/UL (ref 0–0.2)
IMM GRANULOCYTES NFR BLD AUTO: 0 % (ref 0–2)
LDLC SERPL CALC-MCNC: 78 MG/DL (ref 0–100)
LYMPHOCYTES # BLD AUTO: 4.66 THOUSANDS/ÂΜL (ref 0.6–4.47)
LYMPHOCYTES NFR BLD AUTO: 44 % (ref 14–44)
MCH RBC QN AUTO: 31.8 PG (ref 26.8–34.3)
MCHC RBC AUTO-ENTMCNC: 33.1 G/DL (ref 31.4–37.4)
MCV RBC AUTO: 96 FL (ref 82–98)
MICROALBUMIN UR-MCNC: 9.6 MG/L
MICROALBUMIN/CREAT 24H UR: 7 MG/G CREATININE (ref 0–30)
MONOCYTES # BLD AUTO: 1.05 THOUSAND/ÂΜL (ref 0.17–1.22)
MONOCYTES NFR BLD AUTO: 10 % (ref 4–12)
NEUTROPHILS # BLD AUTO: 4.35 THOUSANDS/ÂΜL (ref 1.85–7.62)
NEUTS SEG NFR BLD AUTO: 42 % (ref 43–75)
NRBC BLD AUTO-RTO: 0 /100 WBCS
PLATELET # BLD AUTO: 251 THOUSANDS/UL (ref 149–390)
PMV BLD AUTO: 10.1 FL (ref 8.9–12.7)
POTASSIUM SERPL-SCNC: 4.4 MMOL/L (ref 3.5–5.3)
PROT SERPL-MCNC: 6.7 G/DL (ref 6.4–8.4)
RBC # BLD AUTO: 5.47 MILLION/UL (ref 3.88–5.62)
SODIUM SERPL-SCNC: 137 MMOL/L (ref 135–147)
TRIGL SERPL-MCNC: 81 MG/DL
TSH SERPL DL<=0.05 MIU/L-ACNC: 1.33 UIU/ML (ref 0.45–4.5)
WBC # BLD AUTO: 10.46 THOUSAND/UL (ref 4.31–10.16)

## 2023-09-17 PROCEDURE — 82043 UR ALBUMIN QUANTITATIVE: CPT | Performed by: INTERNAL MEDICINE

## 2023-09-17 PROCEDURE — 80061 LIPID PANEL: CPT

## 2023-09-17 PROCEDURE — 84443 ASSAY THYROID STIM HORMONE: CPT

## 2023-09-17 PROCEDURE — 85025 COMPLETE CBC W/AUTO DIFF WBC: CPT

## 2023-09-17 PROCEDURE — 36415 COLL VENOUS BLD VENIPUNCTURE: CPT

## 2023-09-17 PROCEDURE — 80053 COMPREHEN METABOLIC PANEL: CPT

## 2023-09-17 PROCEDURE — 83036 HEMOGLOBIN GLYCOSYLATED A1C: CPT

## 2023-09-17 PROCEDURE — 82570 ASSAY OF URINE CREATININE: CPT | Performed by: INTERNAL MEDICINE

## 2023-09-19 ENCOUNTER — OFFICE VISIT (OUTPATIENT)
Dept: INTERNAL MEDICINE CLINIC | Facility: CLINIC | Age: 68
End: 2023-09-19
Payer: COMMERCIAL

## 2023-09-19 VITALS
DIASTOLIC BLOOD PRESSURE: 76 MMHG | HEART RATE: 94 BPM | BODY MASS INDEX: 33.18 KG/M2 | OXYGEN SATURATION: 93 % | WEIGHT: 245 LBS | SYSTOLIC BLOOD PRESSURE: 128 MMHG | HEIGHT: 72 IN | TEMPERATURE: 98.3 F

## 2023-09-19 DIAGNOSIS — F41.9 ANXIETY: ICD-10-CM

## 2023-09-19 DIAGNOSIS — J44.9 CHRONIC OBSTRUCTIVE PULMONARY DISEASE, UNSPECIFIED COPD TYPE (HCC): ICD-10-CM

## 2023-09-19 DIAGNOSIS — F34.1 DYSTHYMIC DISORDER: ICD-10-CM

## 2023-09-19 DIAGNOSIS — Z01.00 DIABETIC EYE EXAM (HCC): ICD-10-CM

## 2023-09-19 DIAGNOSIS — E78.2 MIXED HYPERLIPIDEMIA: ICD-10-CM

## 2023-09-19 DIAGNOSIS — E11.9 DIABETIC EYE EXAM (HCC): ICD-10-CM

## 2023-09-19 DIAGNOSIS — E11.65 UNCONTROLLED TYPE 2 DIABETES MELLITUS WITH HYPERGLYCEMIA (HCC): Primary | ICD-10-CM

## 2023-09-19 DIAGNOSIS — I10 ESSENTIAL HYPERTENSION, BENIGN: ICD-10-CM

## 2023-09-19 PROCEDURE — 99214 OFFICE O/P EST MOD 30 MIN: CPT | Performed by: INTERNAL MEDICINE

## 2023-09-19 RX ORDER — ALPRAZOLAM 0.25 MG/1
0.25 TABLET ORAL DAILY PRN
Qty: 30 TABLET | Refills: 0 | Status: SHIPPED | OUTPATIENT
Start: 2023-09-19

## 2023-09-19 NOTE — PROGRESS NOTES
Assessment/Plan:      Tobacco Cessation Counseling: Tobacco cessation counseling was provided. The patient is sincerely urged to quit consumption of tobacco. He is ready to quit tobacco.             1. Uncontrolled type 2 diabetes mellitus with hyperglycemia (HCC)  -     dulaglutide (Trulicity) 3 HR/8.6GJ injection; Inject 0.5 mL (3 mg total) under the skin every 7 days    2. Mixed hyperlipidemia    3. Essential hypertension, benign    4. Diabetic eye exam [facility])  -     Ambulatory Referral to Ophthalmology; Future    5. Dysthymic disorder    6. Chronic obstructive pulmonary disease, unspecified COPD type ([address])    7. Anxiety  -     ALPRAZolam (XANAX) 0.25 mg tablet; Take 1 tablet (0.25 mg total) by mouth daily as needed for anxiety           Subjective:      Patient ID: Ghislaine Valero is a 76 y.o. male. Follow-up on blood test done on 9/17/2023 test discussed with him      The following portions of the patient's history were reviewed and updated as appropriate: He  has a past medical history of Acquired hypothyroidism, Anxiety, Anxiety disorder, Cigarette smoker, Colon cancer screening declined, Diabetes mellitus ([address]), Diabetes mellitus, type 2 ([address]), Dyslipidemia, Essential hypertension, benign, Hyperlipidemia, Hypertension, Kidney stone, and Type II diabetes mellitus, uncontrolled.   He   Patient Active Problem List    Diagnosis Date Noted   • Snoring 07/13/2023   • Hypoxia 07/10/2023   • Acute appendicitis 07/08/2023   • Clinical sepsis ([address]) 07/08/2023   • Depression, recurrent ([address]) 01/24/2023   • Aspirin long-term use 06/27/2022   • GI bleed 06/26/2022   • Hypotension 06/26/2022   • Melena 06/26/2022   • Needs flu shot 09/17/2021   • Body mass index 34.0-34.9, adult 01/12/2021   • Diabetic eye exam ([address]) 01/12/2021   • Obesity, morbid ([address]) 01/12/2021   • Dyslipidemia    • Diabetes mellitus, type 2 ([address])    • Colon cancer screening declined    • Dysthymic disorder 09/16/2020   • Body mass index 36.0-36.9, adult 09/16/2020   • Hypertension    • Mixed hyperlipidemia    • Essential hypertension, benign    • Uncontrolled type 2 diabetes mellitus with hyperglycemia (HCC)    • Anxiety disorder      He  has a past surgical history that includes Kidney stone surgery; Cardiac catheterization; and APPENDECTOMY LAPAROSCOPIC (N/A, 7/8/2023). His family history includes Heart attack in his father; Heart disease in his father. He  reports that he has been smoking cigarettes. He started smoking about 44 years ago. He has a 18.00 pack-year smoking history. He has never used smokeless tobacco. He reports that he does not currently use alcohol after a past usage of about 1.0 standard drink of alcohol per week. He reports current drug use. Drug: Marijuana.   Current Outpatient Medications   Medication Sig Dispense Refill   • ALPRAZolam (XANAX) 0.25 mg tablet Take 1 tablet (0.25 mg total) by mouth daily as needed for anxiety 30 tablet 0   • Blood Glucose Monitoring Suppl (ONE TOUCH ULTRA 2) w/Device KIT CONTOUR METER, USE 3 TIMES A DAY E11.65 1 kit 0   • dulaglutide (Trulicity) 3 GS/7.7GQ injection Inject 0.5 mL (3 mg total) under the skin every 7 days 6 mL 1   • glucose blood (OneTouch Ultra) test strip Use as instructed 100 strip 0   • Lancets MISC Use 3 (three) times a day Contour lancets E11.65 100 each 0   • lisinopril (ZESTRIL) 5 mg tablet Take 1 tablet (5 mg total) by mouth daily 90 tablet 1   • metFORMIN (GLUCOPHAGE) 1000 MG tablet Take 1 tablet (1,000 mg total) by mouth 2 (two) times a day with meals 180 tablet 0   • patient supplied medication Multi-Betic vitamin       • simvastatin (ZOCOR) 20 mg tablet Take 1 tablet (20 mg total) by mouth daily at bedtime 90 tablet 1   • acetaminophen (TYLENOL) 500 mg tablet Take 1 tablet (500 mg total) by mouth every 6 (six) hours as needed for mild pain (Patient not taking: Reported on 9/19/2023)  0   • albuterol (Proventil HFA) 90 mcg/act inhaler Inhale 2 puffs every 6 (six) hours as needed for wheezing (Patient not taking: Reported on 9/19/2023) 6.7 g 0   • umeclidinium-vilanterol (Anoro Ellipta) 62.5-25 mcg/actuation inhaler Inhale 1 puff daily 60 blister 5     No current facility-administered medications for this visit. Current Outpatient Medications on File Prior to Visit   Medication Sig   • Blood Glucose Monitoring Suppl (ONE TOUCH ULTRA 2) w/Device KIT CONTOUR METER, USE 3 TIMES A DAY E11.65   • glucose blood (OneTouch Ultra) test strip Use as instructed   • Lancets MISC Use 3 (three) times a day Contour lancets E11.65   • lisinopril (ZESTRIL) 5 mg tablet Take 1 tablet (5 mg total) by mouth daily   • metFORMIN (GLUCOPHAGE) 1000 MG tablet Take 1 tablet (1,000 mg total) by mouth 2 (two) times a day with meals   • patient supplied medication Multi-Betic vitamin     • simvastatin (ZOCOR) 20 mg tablet Take 1 tablet (20 mg total) by mouth daily at bedtime   • [DISCONTINUED] ALPRAZolam (XANAX) 0.5 mg tablet Take 1 tablet (0.5 mg total) by mouth every 6 (six) hours as needed for anxiety   • [DISCONTINUED] dulaglutide (Trulicity) 1.5 HF/0.6RM injection Inject 0.5 mL (1.5 mg total) under the skin once a week   • acetaminophen (TYLENOL) 500 mg tablet Take 1 tablet (500 mg total) by mouth every 6 (six) hours as needed for mild pain (Patient not taking: Reported on 9/19/2023)   • albuterol (Proventil HFA) 90 mcg/act inhaler Inhale 2 puffs every 6 (six) hours as needed for wheezing (Patient not taking: Reported on 9/19/2023)   • umeclidinium-vilanterol (Anoro Ellipta) 62.5-25 mcg/actuation inhaler Inhale 1 puff daily     No current facility-administered medications on file prior to visit. He has No Known Allergies. .    Review of Systems   Constitutional: Negative for chills and fever. HENT: Negative for congestion, ear pain and sore throat. Eyes: Negative for pain. Respiratory: Negative for cough and shortness of breath. Cardiovascular: Negative for chest pain and leg swelling. Gastrointestinal: Negative for abdominal pain, nausea and vomiting. Endocrine: Negative for polyuria. Genitourinary: Negative for difficulty urinating, frequency and urgency. Musculoskeletal: Negative for arthralgias and back pain. Skin: Negative for rash. Neurological: Negative for weakness and headaches. Psychiatric/Behavioral: Negative for sleep disturbance. The patient is not nervous/anxious.           Objective:      /76 (BP Location: Left arm, Patient Position: Sitting, Cuff Size: Standard)   Pulse 94   Temp 98.3 °F (36.8 °C) (Temporal)   Ht 6' (1.829 m)   Wt 111 kg (245 lb)   SpO2 93%   BMI 33.23 kg/m²     Recent Results (from the past 1344 hour(s))   Albumin / creatinine urine ratio    Collection Time: 09/17/23  8:07 AM   Result Value Ref Range    Creatinine, Ur 142.0 Reference range not established. mg/dL    Albumin,U,Random 9.6 <20.0 mg/L    Albumin Creat Ratio 7 0 - 30 mg/g creatinine   CBC and differential    Collection Time: 09/17/23  8:07 AM   Result Value Ref Range    WBC 10.46 (H) 4.31 - 10.16 Thousand/uL    RBC 5.47 3.88 - 5.62 Million/uL    Hemoglobin 17.4 (H) 12.0 - 17.0 g/dL    Hematocrit 52.6 (H) 36.5 - 49.3 %    MCV 96 82 - 98 fL    MCH 31.8 26.8 - 34.3 pg    MCHC 33.1 31.4 - 37.4 g/dL    RDW 12.1 11.6 - 15.1 %    MPV 10.1 8.9 - 12.7 fL    Platelets 527 955 - 108 Thousands/uL    nRBC 0 /100 WBCs    Neutrophils Relative 42 (L) 43 - 75 %    Immat GRANS % 0 0 - 2 %    Lymphocytes Relative 44 14 - 44 %    Monocytes Relative 10 4 - 12 %    Eosinophils Relative 3 0 - 6 %    Basophils Relative 1 0 - 1 %    Neutrophils Absolute 4.35 1.85 - 7.62 Thousands/µL    Immature Grans Absolute 0.02 0.00 - 0.20 Thousand/uL    Lymphocytes Absolute 4.66 (H) 0.60 - 4.47 Thousands/µL    Monocytes Absolute 1.05 0.17 - 1.22 Thousand/µL    Eosinophils Absolute 0.33 0.00 - 0.61 Thousand/µL    Basophils Absolute 0.05 0.00 - 0.10 Thousands/µL   Comprehensive metabolic panel    Collection Time: 09/17/23  8:07 AM   Result Value Ref Range    Sodium 137 135 - 147 mmol/L    Potassium 4.4 3.5 - 5.3 mmol/L    Chloride 105 96 - 108 mmol/L    CO2 24 21 - 32 mmol/L    ANION GAP 8 mmol/L    BUN 20 5 - 25 mg/dL    Creatinine 0.95 0.60 - 1.30 mg/dL    Glucose, Fasting 120 (H) 65 - 99 mg/dL    Calcium 8.8 8.4 - 10.2 mg/dL    AST 21 13 - 39 U/L    ALT 25 7 - 52 U/L    Alkaline Phosphatase 37 34 - 104 U/L    Total Protein 6.7 6.4 - 8.4 g/dL    Albumin 4.3 3.5 - 5.0 g/dL    Total Bilirubin 0.68 0.20 - 1.00 mg/dL    eGFR 81 ml/min/1.73sq m   Hemoglobin A1C    Collection Time: 09/17/23  8:07 AM   Result Value Ref Range    Hemoglobin A1C 6.7 (H) Normal 4.0-5.6%; PreDiabetic 5.7-6.4%; Diabetic >=6.5%; Glycemic control for adults with diabetes <7.0% %     mg/dl   Lipid Panel with Direct LDL reflex    Collection Time: 09/17/23  8:07 AM   Result Value Ref Range    Cholesterol 134 See Comment mg/dL    Triglycerides 81 See Comment mg/dL    HDL, Direct 40 >=40 mg/dL    LDL Calculated 78 0 - 100 mg/dL   TSH, 3rd generation    Collection Time: 09/17/23  8:07 AM   Result Value Ref Range    TSH 3RD GENERATON 1.330 0.450 - 4.500 uIU/mL        Physical Exam  Constitutional:       Appearance: Normal appearance. HENT:      Head: Normocephalic. Right Ear: Tympanic membrane, ear canal and external ear normal.      Left Ear: Tympanic membrane, ear canal and external ear normal.      Nose: Nose normal. No congestion. Mouth/Throat:      Mouth: Mucous membranes are moist.      Pharynx: Oropharynx is clear. No oropharyngeal exudate or posterior oropharyngeal erythema. Eyes:      Extraocular Movements: Extraocular movements intact. Conjunctiva/sclera: Conjunctivae normal.   Cardiovascular:      Rate and Rhythm: Normal rate and regular rhythm. Heart sounds: Normal heart sounds. No murmur heard. Pulmonary:      Effort: Pulmonary effort is normal.      Breath sounds: Normal breath sounds. No wheezing or rales. Abdominal:      General: Bowel sounds are normal. There is no distension. Palpations: Abdomen is soft. Tenderness: There is no abdominal tenderness. Musculoskeletal:         General: Normal range of motion. Cervical back: Normal range of motion and neck supple. Right lower leg: No edema. Left lower leg: No edema. Lymphadenopathy:      Cervical: No cervical adenopathy. Skin:     General: Skin is warm. Neurological:      General: No focal deficit present. Mental Status: He is alert and oriented to person, place, and time.

## 2023-10-16 DIAGNOSIS — E11.65 UNCONTROLLED TYPE 2 DIABETES MELLITUS WITH HYPERGLYCEMIA (HCC): ICD-10-CM

## 2024-01-23 ENCOUNTER — RA CDI HCC (OUTPATIENT)
Dept: OTHER | Facility: HOSPITAL | Age: 69
End: 2024-01-23

## 2024-01-23 DIAGNOSIS — E78.5 HYPERLIPIDEMIA, UNSPECIFIED: ICD-10-CM

## 2024-01-23 DIAGNOSIS — F41.9 ANXIETY: ICD-10-CM

## 2024-01-23 DIAGNOSIS — I10 ESSENTIAL HYPERTENSION, BENIGN: ICD-10-CM

## 2024-01-23 DIAGNOSIS — E11.65 UNCONTROLLED TYPE 2 DIABETES MELLITUS WITH HYPERGLYCEMIA (HCC): ICD-10-CM

## 2024-01-23 RX ORDER — LISINOPRIL 5 MG/1
5 TABLET ORAL DAILY
Qty: 90 TABLET | Refills: 0 | Status: SHIPPED | OUTPATIENT
Start: 2024-01-23

## 2024-01-23 RX ORDER — ALPRAZOLAM 0.25 MG/1
0.25 TABLET ORAL DAILY PRN
Qty: 30 TABLET | Refills: 0 | Status: SHIPPED | OUTPATIENT
Start: 2024-01-23

## 2024-01-23 RX ORDER — SIMVASTATIN 20 MG
20 TABLET ORAL
Qty: 90 TABLET | Refills: 0 | Status: SHIPPED | OUTPATIENT
Start: 2024-01-23 | End: 2024-02-02

## 2024-01-23 RX ORDER — LISINOPRIL 5 MG/1
5 TABLET ORAL DAILY
Qty: 90 TABLET | Refills: 1 | Status: SHIPPED | OUTPATIENT
Start: 2024-01-23 | End: 2024-02-02

## 2024-01-23 RX ORDER — SIMVASTATIN 20 MG
20 TABLET ORAL
Qty: 90 TABLET | Refills: 1 | Status: SHIPPED | OUTPATIENT
Start: 2024-01-23

## 2024-01-31 ENCOUNTER — TELEPHONE (OUTPATIENT)
Dept: INTERNAL MEDICINE CLINIC | Facility: CLINIC | Age: 69
End: 2024-01-31

## 2024-01-31 DIAGNOSIS — E11.65 UNCONTROLLED TYPE 2 DIABETES MELLITUS WITH HYPERGLYCEMIA (HCC): Primary | ICD-10-CM

## 2024-02-01 ENCOUNTER — RA CDI HCC (OUTPATIENT)
Dept: OTHER | Facility: HOSPITAL | Age: 69
End: 2024-02-01

## 2024-02-01 ENCOUNTER — APPOINTMENT (OUTPATIENT)
Dept: LAB | Facility: CLINIC | Age: 69
End: 2024-02-01
Payer: COMMERCIAL

## 2024-02-01 DIAGNOSIS — E11.65 UNCONTROLLED TYPE 2 DIABETES MELLITUS WITH HYPERGLYCEMIA (HCC): ICD-10-CM

## 2024-02-01 PROBLEM — E11.36 TYPE 2 DIABETES MELLITUS WITH DIABETIC CATARACT (HCC): Status: ACTIVE | Noted: 2024-02-01

## 2024-02-01 PROBLEM — E66.01 OBESITY, MORBID (HCC): Status: RESOLVED | Noted: 2021-01-12 | Resolved: 2024-02-01

## 2024-02-01 LAB
ALBUMIN SERPL BCP-MCNC: 4.3 G/DL (ref 3.5–5)
ALP SERPL-CCNC: 37 U/L (ref 34–104)
ALT SERPL W P-5'-P-CCNC: 25 U/L (ref 7–52)
ANION GAP SERPL CALCULATED.3IONS-SCNC: 10 MMOL/L
AST SERPL W P-5'-P-CCNC: 20 U/L (ref 13–39)
BASOPHILS # BLD AUTO: 0.04 THOUSANDS/ÂΜL (ref 0–0.1)
BASOPHILS NFR BLD AUTO: 0 % (ref 0–1)
BILIRUB SERPL-MCNC: 0.74 MG/DL (ref 0.2–1)
BUN SERPL-MCNC: 17 MG/DL (ref 5–25)
CALCIUM SERPL-MCNC: 9.4 MG/DL (ref 8.4–10.2)
CHLORIDE SERPL-SCNC: 104 MMOL/L (ref 96–108)
CHOLEST SERPL-MCNC: 110 MG/DL
CO2 SERPL-SCNC: 26 MMOL/L (ref 21–32)
CREAT SERPL-MCNC: 1.01 MG/DL (ref 0.6–1.3)
CREAT UR-MCNC: 82.9 MG/DL
EOSINOPHIL # BLD AUTO: 0.39 THOUSAND/ÂΜL (ref 0–0.61)
EOSINOPHIL NFR BLD AUTO: 3 % (ref 0–6)
ERYTHROCYTE [DISTWIDTH] IN BLOOD BY AUTOMATED COUNT: 12.9 % (ref 11.6–15.1)
EST. AVERAGE GLUCOSE BLD GHB EST-MCNC: 146 MG/DL
GFR SERPL CREATININE-BSD FRML MDRD: 76 ML/MIN/1.73SQ M
GLUCOSE P FAST SERPL-MCNC: 109 MG/DL (ref 65–99)
HBA1C MFR BLD: 6.7 %
HCT VFR BLD AUTO: 52.7 % (ref 36.5–49.3)
HDLC SERPL-MCNC: 44 MG/DL
HGB BLD-MCNC: 17.2 G/DL (ref 12–17)
IMM GRANULOCYTES # BLD AUTO: 0.03 THOUSAND/UL (ref 0–0.2)
IMM GRANULOCYTES NFR BLD AUTO: 0 % (ref 0–2)
LDLC SERPL CALC-MCNC: 52 MG/DL (ref 0–100)
LYMPHOCYTES # BLD AUTO: 4.37 THOUSANDS/ÂΜL (ref 0.6–4.47)
LYMPHOCYTES NFR BLD AUTO: 36 % (ref 14–44)
MCH RBC QN AUTO: 30.6 PG (ref 26.8–34.3)
MCHC RBC AUTO-ENTMCNC: 32.6 G/DL (ref 31.4–37.4)
MCV RBC AUTO: 94 FL (ref 82–98)
MICROALBUMIN UR-MCNC: 7 MG/L
MICROALBUMIN/CREAT 24H UR: 8 MG/G CREATININE (ref 0–30)
MONOCYTES # BLD AUTO: 1.2 THOUSAND/ÂΜL (ref 0.17–1.22)
MONOCYTES NFR BLD AUTO: 10 % (ref 4–12)
NEUTROPHILS # BLD AUTO: 6.26 THOUSANDS/ÂΜL (ref 1.85–7.62)
NEUTS SEG NFR BLD AUTO: 51 % (ref 43–75)
NRBC BLD AUTO-RTO: 0 /100 WBCS
PLATELET # BLD AUTO: 264 THOUSANDS/UL (ref 149–390)
PMV BLD AUTO: 9.7 FL (ref 8.9–12.7)
POTASSIUM SERPL-SCNC: 4.7 MMOL/L (ref 3.5–5.3)
PROT SERPL-MCNC: 6.8 G/DL (ref 6.4–8.4)
RBC # BLD AUTO: 5.62 MILLION/UL (ref 3.88–5.62)
SODIUM SERPL-SCNC: 140 MMOL/L (ref 135–147)
TRIGL SERPL-MCNC: 71 MG/DL
TSH SERPL DL<=0.05 MIU/L-ACNC: 1.51 UIU/ML (ref 0.45–4.5)
WBC # BLD AUTO: 12.29 THOUSAND/UL (ref 4.31–10.16)

## 2024-02-01 PROCEDURE — 85025 COMPLETE CBC W/AUTO DIFF WBC: CPT

## 2024-02-01 PROCEDURE — 80061 LIPID PANEL: CPT

## 2024-02-01 PROCEDURE — 82043 UR ALBUMIN QUANTITATIVE: CPT

## 2024-02-01 PROCEDURE — 84443 ASSAY THYROID STIM HORMONE: CPT

## 2024-02-01 PROCEDURE — 80053 COMPREHEN METABOLIC PANEL: CPT

## 2024-02-01 PROCEDURE — 82570 ASSAY OF URINE CREATININE: CPT

## 2024-02-01 PROCEDURE — 36415 COLL VENOUS BLD VENIPUNCTURE: CPT

## 2024-02-01 PROCEDURE — 83036 HEMOGLOBIN GLYCOSYLATED A1C: CPT

## 2024-02-02 ENCOUNTER — OFFICE VISIT (OUTPATIENT)
Dept: INTERNAL MEDICINE CLINIC | Facility: CLINIC | Age: 69
End: 2024-02-02
Payer: COMMERCIAL

## 2024-02-02 VITALS
WEIGHT: 242 LBS | HEIGHT: 72 IN | OXYGEN SATURATION: 91 % | SYSTOLIC BLOOD PRESSURE: 136 MMHG | HEART RATE: 77 BPM | BODY MASS INDEX: 32.78 KG/M2 | DIASTOLIC BLOOD PRESSURE: 82 MMHG | TEMPERATURE: 98.3 F

## 2024-02-02 DIAGNOSIS — I10 ESSENTIAL HYPERTENSION, BENIGN: ICD-10-CM

## 2024-02-02 DIAGNOSIS — F34.1 DYSTHYMIC DISORDER: ICD-10-CM

## 2024-02-02 DIAGNOSIS — E11.9 TYPE 2 DIABETES, HBA1C GOAL < 7% (HCC): ICD-10-CM

## 2024-02-02 DIAGNOSIS — E11.65 UNCONTROLLED TYPE 2 DIABETES MELLITUS WITH HYPERGLYCEMIA (HCC): Primary | ICD-10-CM

## 2024-02-02 DIAGNOSIS — E78.2 MIXED HYPERLIPIDEMIA: ICD-10-CM

## 2024-02-02 DIAGNOSIS — J44.9 CHRONIC OBSTRUCTIVE PULMONARY DISEASE, UNSPECIFIED COPD TYPE (HCC): ICD-10-CM

## 2024-02-02 PROBLEM — J96.01 ACUTE RESPIRATORY FAILURE WITH HYPOXIA (HCC): Status: ACTIVE | Noted: 2024-02-02

## 2024-02-02 PROCEDURE — 99214 OFFICE O/P EST MOD 30 MIN: CPT | Performed by: INTERNAL MEDICINE

## 2024-02-02 NOTE — PROGRESS NOTES
HCC coding opportunities    E11.36     Chart Reviewed number of suggestions sent to Provider: 1   GR    Patients Insurance     Medicare Insurance: [employer]

## 2024-02-02 NOTE — PROGRESS NOTES
Assessment/Plan:      Tobacco Cessation Counseling: Tobacco cessation counseling was provided. The patient is sincerely urged to quit consumption of tobacco. He is ready to quit tobacco.             1. Uncontrolled type 2 diabetes mellitus with hyperglycemia (HCC)    2. Essential hypertension, benign  -     CBC and differential; Future  -     Comprehensive metabolic panel; Future  -     Lipid Panel with Direct LDL reflex; Future  -     TSH, 3rd generation; Future    3. Mixed hyperlipidemia  -     Comprehensive metabolic panel; Future  -     Lipid Panel with Direct LDL reflex; Future  -     TSH, 3rd generation; Future    4. Dysthymic disorder    5. Type 2 diabetes, HbA1c goal < 7% (HCC)  -     CBC and differential; Future  -     Comprehensive metabolic panel; Future  -     Lipid Panel with Direct LDL reflex; Future  -     Albumin / creatinine urine ratio; Future  -     TSH, 3rd generation; Future  -     Hemoglobin A1C; Future    6. Chronic obstructive pulmonary disease, unspecified COPD type (Former[facility])           Subjective:      Patient ID: Dominik Reed is a 68 y.o. male.    Follow-up on blood and urine test done yesterday test discussed with him        The following portions of the patient's history were reviewed and updated as appropriate: He  has a past medical history of Acquired hypothyroidism, Anxiety, Anxiety disorder, Cigarette smoker, Colon cancer screening declined, Diabetes mellitus (HCC), Diabetes mellitus, type 2 (HCC), Dyslipidemia, Essential hypertension, benign, Hyperlipidemia, Hypertension, Kidney stone, Obesity, morbid ([facility]) (01/12/2021), and Type II diabetes mellitus, uncontrolled.  He   Patient Active Problem List    Diagnosis Date Noted    Acute respiratory failure with hypoxia ([facility]) 02/02/2024    Chronic obstructive pulmonary disease, unspecified COPD type (HCC) 02/02/2024    Type 2 diabetes mellitus with diabetic cataract ([facility]) 02/01/2024    Snoring 07/13/2023    Hypoxia 07/10/2023    Acute  appendicitis 07/08/2023    Clinical sepsis ([facility]) 07/08/2023    Depression, recurrent ([facility]) 01/24/2023    Aspirin long-term use 06/27/2022    GI bleed 06/26/2022    Hypotension 06/26/2022    Melena 06/26/2022    Needs flu shot 09/17/2021    Body mass index 34.0-34.9, adult 01/12/2021    Diabetic eye exam ([facility]) 01/12/2021    Dyslipidemia     Diabetes mellitus, type 2 ([facility])     Colon cancer screening declined     Dysthymic disorder 09/16/2020    Body mass index 36.0-36.9, adult 09/16/2020    Hypertension     Mixed hyperlipidemia     Essential hypertension, benign     Uncontrolled type 2 diabetes mellitus with hyperglycemia ([facility])     Anxiety disorder      He  has a past surgical history that includes Kidney stone surgery; Cardiac catheterization; and APPENDECTOMY LAPAROSCOPIC (N/A, 7/8/2023).  His family history includes Heart attack in his father; Heart disease in his father.  He  reports that he has been smoking cigarettes. He started smoking about 45 years ago. He has a 19.9 pack-year smoking history. He has never been exposed to tobacco smoke. He has never used smokeless tobacco. He reports that he does not currently use alcohol after a past usage of about 1.0 standard drink of alcohol per week. He reports current drug use. Drug: Marijuana.  Current Outpatient Medications   Medication Sig Dispense Refill    ALPRAZolam (XANAX) 0.25 mg tablet Take 1 tablet (0.25 mg total) by mouth daily as needed for anxiety 30 tablet 0    ALPRAZolam (XANAX) 0.25 mg tablet Take 1 tablet (0.25 mg total) by mouth daily as needed for anxiety 30 tablet 0    Blood Glucose Monitoring Suppl (ONE TOUCH ULTRA 2) w/Device KIT CONTOUR METER, USE 3 TIMES A DAY E11.65 1 kit 0    dulaglutide (Trulicity) 3 MG/0.5ML injection Inject 0.5 mL (3 mg total) under the skin every 7 days 6 mL 1    glucose blood (OneTouch Ultra) test strip Use as instructed 100 strip 0    Lancets MISC Use 3 (three) times a day Contour lancets E11.65 100 each 0    lisinopril  (ZESTRIL) 5 mg tablet Take 1 tablet (5 mg total) by mouth daily 90 tablet 0    metFORMIN (GLUCOPHAGE) 1000 MG tablet Take 1 tablet (1,000 mg total) by mouth 2 (two) times a day with meals 180 tablet 0    patient supplied medication Multi-Betic vitamin        simvastatin (ZOCOR) 20 mg tablet Take 1 tablet (20 mg total) by mouth daily at bedtime 90 tablet 1    umeclidinium-vilanterol (Anoro Ellipta) 62.5-25 mcg/actuation inhaler Inhale 1 puff daily 60 blister 5    acetaminophen (TYLENOL) 500 mg tablet Take 1 tablet (500 mg total) by mouth every 6 (six) hours as needed for mild pain (Patient not taking: Reported on 9/19/2023)  0    albuterol (Proventil HFA) 90 mcg/act inhaler Inhale 2 puffs every 6 (six) hours as needed for wheezing (Patient not taking: Reported on 9/19/2023) 6.7 g 0     No current facility-administered medications for this visit.     Current Outpatient Medications on File Prior to Visit   Medication Sig    ALPRAZolam (XANAX) 0.25 mg tablet Take 1 tablet (0.25 mg total) by mouth daily as needed for anxiety    ALPRAZolam (XANAX) 0.25 mg tablet Take 1 tablet (0.25 mg total) by mouth daily as needed for anxiety    Blood Glucose Monitoring Suppl (ONE TOUCH ULTRA 2) w/Device KIT CONTOUR METER, USE 3 TIMES A DAY E11.65    dulaglutide (Trulicity) 3 MG/0.5ML injection Inject 0.5 mL (3 mg total) under the skin every 7 days    glucose blood (OneTouch Ultra) test strip Use as instructed    Lancets MISC Use 3 (three) times a day Contour lancets E11.65    lisinopril (ZESTRIL) 5 mg tablet Take 1 tablet (5 mg total) by mouth daily    metFORMIN (GLUCOPHAGE) 1000 MG tablet Take 1 tablet (1,000 mg total) by mouth 2 (two) times a day with meals    patient supplied medication Multi-Betic vitamin      simvastatin (ZOCOR) 20 mg tablet Take 1 tablet (20 mg total) by mouth daily at bedtime    umeclidinium-vilanterol (Anoro Ellipta) 62.5-25 mcg/actuation inhaler Inhale 1 puff daily    [DISCONTINUED] lisinopril (ZESTRIL) 5 mg  tablet Take 1 tablet (5 mg total) by mouth daily    [DISCONTINUED] metFORMIN (GLUCOPHAGE) 1000 MG tablet Take 1 tablet (1,000 mg total) by mouth 2 (two) times a day with meals    [DISCONTINUED] simvastatin (ZOCOR) 20 mg tablet Take 1 tablet (20 mg total) by mouth daily at bedtime    acetaminophen (TYLENOL) 500 mg tablet Take 1 tablet (500 mg total) by mouth every 6 (six) hours as needed for mild pain (Patient not taking: Reported on 9/19/2023)    albuterol (Proventil HFA) 90 mcg/act inhaler Inhale 2 puffs every 6 (six) hours as needed for wheezing (Patient not taking: Reported on 9/19/2023)     No current facility-administered medications on file prior to visit.     He has No Known Allergies..    Review of Systems   Constitutional:  Negative for chills and fever.   HENT:  Negative for congestion, ear pain and sore throat.    Eyes:  Negative for pain.   Respiratory:  Negative for cough and shortness of breath.    Cardiovascular:  Negative for chest pain and leg swelling.   Gastrointestinal:  Negative for abdominal pain, nausea and vomiting.   Endocrine: Negative for polyuria.   Genitourinary:  Negative for difficulty urinating, frequency and urgency.   Musculoskeletal:  Negative for arthralgias and back pain.   Skin:  Negative for rash.   Neurological:  Negative for weakness and headaches.   Psychiatric/Behavioral:  Negative for sleep disturbance. The patient is not nervous/anxious.          Objective:      /82 (BP Location: Left arm, Patient Position: Sitting, Cuff Size: Large)   Pulse 77   Temp 98.3 °F (36.8 °C) (Temporal)   Ht 6' (1.829 m)   Wt 110 kg (242 lb)   SpO2 91%   BMI 32.82 kg/m²     Recent Results (from the past 1344 hour(s))   CBC and differential    Collection Time: 02/01/24  6:33 AM   Result Value Ref Range    WBC 12.29 (H) 4.31 - 10.16 Thousand/uL    RBC 5.62 3.88 - 5.62 Million/uL    Hemoglobin 17.2 (H) 12.0 - 17.0 g/dL    Hematocrit 52.7 (H) 36.5 - 49.3 %    MCV 94 82 - 98 fL    MCH  30.6 26.8 - 34.3 pg    MCHC 32.6 31.4 - 37.4 g/dL    RDW 12.9 11.6 - 15.1 %    MPV 9.7 8.9 - 12.7 fL    Platelets 264 149 - 390 Thousands/uL    nRBC 0 /100 WBCs    Neutrophils Relative 51 43 - 75 %    Immat GRANS % 0 0 - 2 %    Lymphocytes Relative 36 14 - 44 %    Monocytes Relative 10 4 - 12 %    Eosinophils Relative 3 0 - 6 %    Basophils Relative 0 0 - 1 %    Neutrophils Absolute 6.26 1.85 - 7.62 Thousands/µL    Immature Grans Absolute 0.03 0.00 - 0.20 Thousand/uL    Lymphocytes Absolute 4.37 0.60 - 4.47 Thousands/µL    Monocytes Absolute 1.20 0.17 - 1.22 Thousand/µL    Eosinophils Absolute 0.39 0.00 - 0.61 Thousand/µL    Basophils Absolute 0.04 0.00 - 0.10 Thousands/µL   Comprehensive metabolic panel    Collection Time: 02/01/24  6:33 AM   Result Value Ref Range    Sodium 140 135 - 147 mmol/L    Potassium 4.7 3.5 - 5.3 mmol/L    Chloride 104 96 - 108 mmol/L    CO2 26 21 - 32 mmol/L    ANION GAP 10 mmol/L    BUN 17 5 - 25 mg/dL    Creatinine 1.01 0.60 - 1.30 mg/dL    Glucose, Fasting 109 (H) 65 - 99 mg/dL    Calcium 9.4 8.4 - 10.2 mg/dL    AST 20 13 - 39 U/L    ALT 25 7 - 52 U/L    Alkaline Phosphatase 37 34 - 104 U/L    Total Protein 6.8 6.4 - 8.4 g/dL    Albumin 4.3 3.5 - 5.0 g/dL    Total Bilirubin 0.74 0.20 - 1.00 mg/dL    eGFR 76 ml/min/1.73sq m   Lipid Panel with Direct LDL reflex    Collection Time: 02/01/24  6:33 AM   Result Value Ref Range    Cholesterol 110 See Comment mg/dL    Triglycerides 71 See Comment mg/dL    HDL, Direct 44 >=40 mg/dL    LDL Calculated 52 0 - 100 mg/dL   Albumin / creatinine urine ratio    Collection Time: 02/01/24  6:33 AM   Result Value Ref Range    Creatinine, Ur 82.9 Reference range not established. mg/dL    Albumin,U,Random 7.0 <20.0 mg/L    Albumin Creat Ratio 8 0 - 30 mg/g creatinine   TSH, 3rd generation    Collection Time: 02/01/24  6:33 AM   Result Value Ref Range    TSH 3RD GENERATON 1.511 0.450 - 4.500 uIU/mL   Hemoglobin A1C    Collection Time: 02/01/24  6:33 AM    Result Value Ref Range    Hemoglobin A1C 6.7 (H) Normal 4.0-5.6%; PreDiabetic 5.7-6.4%; Diabetic >=6.5%; Glycemic control for adults with diabetes <7.0% %     mg/dl        Physical Exam  Constitutional:       Appearance: Normal appearance.   HENT:      Head: Normocephalic.      Right Ear: Tympanic membrane, ear canal and external ear normal.      Left Ear: Tympanic membrane, ear canal and external ear normal.      Nose: Nose normal. No congestion.      Mouth/Throat:      Mouth: Mucous membranes are moist.      Pharynx: Oropharynx is clear. No oropharyngeal exudate or posterior oropharyngeal erythema.   Eyes:      Extraocular Movements: Extraocular movements intact.      Conjunctiva/sclera: Conjunctivae normal.   Cardiovascular:      Rate and Rhythm: Normal rate and regular rhythm.      Heart sounds: Normal heart sounds. No murmur heard.  Pulmonary:      Effort: Pulmonary effort is normal.      Breath sounds: Normal breath sounds. No wheezing or rales.   Abdominal:      General: Abdomen is flat. There is no distension.      Palpations: Abdomen is soft.      Tenderness: There is no abdominal tenderness.   Musculoskeletal:         General: Normal range of motion.      Cervical back: Normal range of motion and neck supple.      Right lower leg: No edema.      Left lower leg: No edema.   Lymphadenopathy:      Cervical: No cervical adenopathy.   Skin:     General: Skin is warm.   Neurological:      General: No focal deficit present.      Mental Status: He is alert and oriented to person, place, and time.

## 2024-02-12 DIAGNOSIS — J44.9 CHRONIC OBSTRUCTIVE PULMONARY DISEASE, UNSPECIFIED COPD TYPE (HCC): ICD-10-CM

## 2024-02-12 RX ORDER — UMECLIDINIUM BROMIDE AND VILANTEROL TRIFENATATE 62.5; 25 UG/1; UG/1
1 POWDER RESPIRATORY (INHALATION) DAILY
Qty: 60 EACH | Refills: 5 | Status: SHIPPED | OUTPATIENT
Start: 2024-02-12 | End: 2024-03-13

## 2024-03-22 DIAGNOSIS — F41.9 ANXIETY: ICD-10-CM

## 2024-03-22 DIAGNOSIS — E11.65 UNCONTROLLED TYPE 2 DIABETES MELLITUS WITH HYPERGLYCEMIA (HCC): ICD-10-CM

## 2024-03-22 RX ORDER — ALPRAZOLAM 0.25 MG/1
0.25 TABLET ORAL DAILY PRN
Qty: 30 TABLET | Refills: 0 | Status: SHIPPED | OUTPATIENT
Start: 2024-03-22

## 2024-05-05 ENCOUNTER — PATIENT MESSAGE (OUTPATIENT)
Dept: INTERNAL MEDICINE CLINIC | Facility: CLINIC | Age: 69
End: 2024-05-05

## 2024-05-05 ENCOUNTER — HOSPITAL ENCOUNTER (EMERGENCY)
Facility: HOSPITAL | Age: 69
Discharge: HOME | End: 2024-05-05
Attending: EMERGENCY MEDICINE | Admitting: EMERGENCY MEDICINE
Payer: COMMERCIAL

## 2024-05-05 ENCOUNTER — NURSE TRIAGE (OUTPATIENT)
Dept: OTHER | Facility: OTHER | Age: 69
End: 2024-05-05

## 2024-05-05 VITALS
RESPIRATION RATE: 20 BRPM | TEMPERATURE: 98.8 F | OXYGEN SATURATION: 97 % | SYSTOLIC BLOOD PRESSURE: 178 MMHG | HEART RATE: 90 BPM | DIASTOLIC BLOOD PRESSURE: 114 MMHG

## 2024-05-05 DIAGNOSIS — M54.42 ACUTE BILATERAL LOW BACK PAIN WITH LEFT-SIDED SCIATICA: Primary | ICD-10-CM

## 2024-05-05 PROCEDURE — 99282 EMERGENCY DEPT VISIT SF MDM: CPT

## 2024-05-05 PROCEDURE — 99284 EMERGENCY DEPT VISIT MOD MDM: CPT | Performed by: EMERGENCY MEDICINE

## 2024-05-05 PROCEDURE — 96372 THER/PROPH/DIAG INJ SC/IM: CPT

## 2024-05-05 RX ORDER — KETOROLAC TROMETHAMINE 30 MG/ML
15 INJECTION, SOLUTION INTRAMUSCULAR; INTRAVENOUS ONCE
Status: COMPLETED | OUTPATIENT
Start: 2024-05-05 | End: 2024-05-05

## 2024-05-05 RX ADMIN — KETOROLAC TROMETHAMINE 15 MG: 30 INJECTION, SOLUTION INTRAMUSCULAR; INTRAVENOUS at 14:29

## 2024-05-05 NOTE — DISCHARGE INSTRUCTIONS
[facility] will call you and try to arrange follow up with PT and orthopedics - please call your family physician as well to try and get an appointment to help arrange an outpatient MRI. If you have worsening symptoms including weakness in your lower extremities, new numbness, or difficulty controlling your bowels or bladder please return to the ED. Continue to use supportive care at home with ibuprofen, heat/ice, and activity as tolerated in the mean time.

## 2024-05-05 NOTE — TELEPHONE ENCOUNTER
"Regarding: Back pain  ----- Message from Ros Fisher sent at 5/5/2024 11:24 AM EDT -----  Pt stated, \" For 3 weeks I have had  a terrible shooting  pain from by back  into my lower left hip. I can not sleep at night and I am at the point that I can not stand it anymore.\"    "

## 2024-05-05 NOTE — TELEPHONE ENCOUNTER
"Reason for Disposition  • [1] SEVERE back pain (e.g., excruciating, unable to do any normal activities) AND [2] not improved 2 hours after pain medicine    Answer Assessment - Initial Assessment Questions  1. ONSET: \"When did the pain begin?\"       3 weeks ago    2. LOCATION: \"Where does it hurt?\" (upper, mid or lower back)      Left lower back into left hip and knee    3. SEVERITY: \"How bad is the pain?\"  (e.g., Scale 1-10; mild, moderate, or severe)    - MILD (1-3): doesn't interfere with normal activities     - MODERATE (4-7): interferes with normal activities or awakens from sleep     - SEVERE (8-10): excruciating pain, unable to do any normal activities       Moderate 4/10. States he just took medication. When medication wears off pain is severe 8/10.    4. PATTERN: \"Is the pain constant?\" (e.g., yes, no; constant, intermittent)       Constant    5. RADIATION: \"Does the pain shoot into your legs or elsewhere?\"      Left hip and knee    6. CAUSE:  \"What do you think is causing the back pain?\"       Unknown, possible sciatica     7. BACK OVERUSE:  \"Any recent lifting of heavy objects, strenuous work or exercise?\"      States back pain started hurting while camping 3 weeks ago. Sates he was fishing and casting a lot, but penny snot remember feeling anything pull or injuring his back during that time.    8. MEDICATIONS: \"What have you taken so far for the pain?\" (e.g., nothing, acetaminophen, NSAIDS)      Ibuprofen, Alieve, Icy Hot, heating pad, and ice, stretches    9. NEUROLOGIC SYMPTOMS: \"Do you have any weakness, numbness, or problems with bowel/bladder control?\"      Denies    10. OTHER SYMPTOMS: \"Do you have any other symptoms?\" (e.g., fever, abdominal pain, burning with urination, blood in urine)        Irritability, muscle spams down left thigh    Protocols used: Back Pain-ADULT-AH    "

## 2024-05-05 NOTE — ED ATTENDING ATTESTATION
5/5/2024  I, Amy Adams MD, saw and evaluated the patient. I have discussed the patient with the resident/non-physician practitioner and agree with the resident's/non-physician practitioner's findings, Plan of Care, and MDM as documented in the resident's/non-physician practitioner's note, except where noted. All available labs and Radiology studies were reviewed.  I was present for key portions of any procedure(s) performed by the resident/non-physician practitioner and I was immediately available to provide assistance.       At this point I agree with the current assessment done in the Emergency Department.  I have conducted an independent evaluation of this patient a history and physical is as follows:    68-year-old male presenting for evaluation of left lower back pain radiating down the back of his left leg.  Pain started 3 weeks ago after he slept overnight on a pullout couch in his [place] while camping in the [facility].  He has had similar pain in the past but is resolved after a couple of days.  This pain has persisted prompting him to come in for evaluation.  Denies bowel or bladder incontinence, saddle anesthesia, or numbness in his legs.  He is able to ambulate.  No fevers or chills.  No trauma to the area.  Denies rashes or tick bites.    Physical Exam  Vitals and nursing note reviewed.   Constitutional:       General: He is not in acute distress.     Appearance: He is well-developed. He is not diaphoretic.   HENT:      Head: Normocephalic and atraumatic.      Right Ear: External ear normal.      Left Ear: External ear normal.      Nose: Nose normal.   Eyes:      Conjunctiva/sclera: Conjunctivae normal.   Cardiovascular:      Rate and Rhythm: Normal rate and regular rhythm.      Pulses: Normal pulses.      Heart sounds: Normal heart sounds. No murmur heard.     No friction rub. No gallop.   Pulmonary:      Effort: Pulmonary effort is normal. No respiratory distress.      Breath sounds: Normal  breath sounds. No wheezing or rales.   Abdominal:      General: Bowel sounds are normal. There is no distension.      Palpations: Abdomen is soft.      Tenderness: There is no abdominal tenderness. There is no guarding.   Musculoskeletal:         General: No deformity. Normal range of motion.      Cervical back: Normal range of motion and neck supple.      Comments: No midline tenderness to palpation over the T or L-spine.  Tenderness to palpation of the left paraspinous muscles of the lower lumbar spine.  Positive straight leg raise on the left.   Skin:     General: Skin is warm and dry.   Neurological:      Mental Status: He is alert and oriented to person, place, and time.      Motor: No abnormal muscle tone.      Comments: 5 out of 5 strength in the proximal and distal bilateral lower extremities with intact sensation to light touch.  No saddle anesthesia.   Psychiatric:         Mood and Affect: Mood normal.           ED Course  ED Course as of 05/05/24 1455   Sun May 05, 2024   1454 No midline tenderness.  Patient's plain starts in the left gluteal region and radiates down the back of the left leg to the level of the knee.  Positive straight right leg raise on the left.  5 out of 5 strength proximally and distally with intact sensation to light touch.  No saddle anesthesia.  No bowel or bladder incontinence.  Patient is able to ambulate.  No red flag symptoms for cauda equina, conus medullaris, or spinal cord mass lesion.  Exam most consistent with lumbar radiculopathy, possibly secondary to herniated disc.  Pain does improve with NSAIDs.  Discussed risk versus benefits of trial of oral steroid.  As patient is having some relief with NSAIDs he does not wish to switch to an oral steroid at this time.  Muscle relaxers unlikely to be helpful.  Will [facility] to [facility] for PT eval.  Patient counseled to follow-up with his PCP as soon as possible to try to arrange for outpatient MRI.  Strict return  precautions discussed and patient discharged in good condition.  Abdomen completely benign to deep palpation.         Critical Care Time  Procedures

## 2024-05-05 NOTE — ED PROVIDER NOTES
History  Chief Complaint   Patient presents with    Back Pain     Lower L back pain radiating to L legx 3 weeks. Ambulatory in triage. Denies injury/trauma     HPI    68-year-old male patient presenting with lower back pain for 3 weeks.  He states that the discomfort started approximately 3 weeks ago when he was camping in the [facility].  He denies any trauma at that time or heavy labor including lifting, pushing or pulling things.  He states that the pain is located in the lumbar/sacral area and radiates down thigh posteriorly.  Denies any associated weakness or bowel or bladder dysfunction.  Denies any numbness or tingling to the area.  Has been taking ibuprofen every 6 hours with temporary relief as well as using lidocaine patches, heat, ice.  He states he has had back pain in the past but typically it is only lasted 1 to 2 days, has never been this severe or lasted as long.  Denies any fevers or chills.    Prior to Admission Medications   Prescriptions Last Dose Informant Patient Reported? Taking?   ALPRAZolam (XANAX) 0.25 mg tablet  Self No No   Sig: Take 1 tablet (0.25 mg total) by mouth daily as needed for anxiety   ALPRAZolam (XANAX) 0.25 mg tablet   No No   Sig: Take 1 tablet (0.25 mg total) by mouth daily as needed for anxiety   Blood Glucose Monitoring Suppl (ONE TOUCH ULTRA 2) w/Device KIT  Self No No   Sig: CONTOUR METER, USE 3 TIMES A DAY E11.65   Lancets MISC  Self No No   Sig: Use 3 (three) times a day Contour lancets E11.65   acetaminophen (TYLENOL) 500 mg tablet  Self No No   Sig: Take 1 tablet (500 mg total) by mouth every 6 (six) hours as needed for mild pain   Patient not taking: Reported on 9/19/2023   albuterol (Proventil HFA) 90 mcg/act inhaler  Self No No   Sig: Inhale 2 puffs every 6 (six) hours as needed for wheezing   Patient not taking: Reported on 9/19/2023   dulaglutide (Trulicity) 3 MG/0.5ML injection   No No   Sig: Inject 0.5 mL (3 mg total) under the skin every 7 days   glucose blood  (OneTouch Ultra) test strip  Self No No   Sig: Use as instructed   lisinopril (ZESTRIL) 5 mg tablet  Self No No   Sig: Take 1 tablet (5 mg total) by mouth daily   metFORMIN (GLUCOPHAGE) 1000 MG tablet  Self No No   Sig: Take 1 tablet (1,000 mg total) by mouth 2 (two) times a day with meals   patient supplied medication  Self Yes No   Sig: Multi-Betic vitamin     simvastatin (ZOCOR) 20 mg tablet  Self No No   Sig: Take 1 tablet (20 mg total) by mouth daily at bedtime   umeclidinium-vilanterol (Anoro Ellipta) 62.5-25 mcg/actuation inhaler   No No   Sig: INHALE 1 PUFF DAILY      Facility-Administered Medications: None       Past Medical History:   Diagnosis Date    Acquired hypothyroidism     Anxiety     Anxiety disorder     Cigarette smoker     Colon cancer screening declined     does understand the risk of missing colon cancer - As per [employer]    Diabetes mellitus (HCC)     Diabetes mellitus, type 2 (HCC)     Dyslipidemia     Essential hypertension, benign     Hyperlipidemia     Hypertension     Kidney stone     Obesity, morbid (HCC) 01/12/2021    Type II diabetes mellitus, uncontrolled        Past Surgical History:   Procedure Laterality Date    APPENDECTOMY LAPAROSCOPIC N/A 7/8/2023    Procedure: APPENDECTOMY LAPAROSCOPIC;  Surgeon: Wayne Guillen MD;  Location: [facility];  Service: General    CARDIAC CATHETERIZATION      1/19/09 no obstructive- Dr. Garcia    KIDNEY STONE SURGERY      ? 2006 s/p stone removal         Family History   Problem Relation Age of Onset    Heart disease Father     Heart attack Father      I have reviewed and agree with the history as documented.    E-Cigarette/Vaping    E-Cigarette Use Never User      E-Cigarette/Vaping Substances    Nicotine No     THC No     CBD No     Flavoring No     Other No     Unknown No      Social History     Tobacco Use    Smoking status: Every Day     Current packs/day: 0.00     Average packs/day: 0.5 packs/day for 39.7 years (19.9 ttl pk-yrs)      Types: Cigarettes     Start date:      Last attempt to quit: 2018     Years since quittin.6     Passive exposure: Never    Smokeless tobacco: Never    Tobacco comments:     QUIT FOR 3 YEARS 3482-7681 AND STARTED SMOKING AGAIN, QUIT 2018   Vaping Use    Vaping status: Never Used   Substance Use Topics    Alcohol use: Not Currently     Alcohol/week: 1.0 standard drink of alcohol     Types: 1 Glasses of wine per week    Drug use: Yes     Types: Marijuana        Review of Systems   Constitutional:  Negative for chills and fever.   HENT: Negative.     Eyes: Negative.    Respiratory:  Negative for cough and shortness of breath.    Cardiovascular:  Negative for chest pain and palpitations.   Gastrointestinal:  Negative for abdominal pain and vomiting.   Genitourinary:  Negative for dysuria and hematuria.   Musculoskeletal:  Positive for back pain. Negative for arthralgias.   Skin:  Negative for color change and rash.   Neurological:  Negative for syncope and light-headedness.   All other systems reviewed and are negative.      Physical Exam  ED Triage Vitals   Temperature Pulse Respirations Blood Pressure SpO2   24 1223 24 1223 24 1223 24 1223 24 1223   98.8 °F (37.1 °C) 90 20 (!) 178/114 97 %      Temp Source Heart Rate Source Patient Position - Orthostatic VS BP Location FiO2 (%)   24 1223 24 1223 24 1223 24 1223 --   Oral Monitor Sitting Right arm       Pain Score       24 1429       8             Orthostatic Vital Signs  Vitals:    24 1223   BP: (!) 178/114   Pulse: 90   Patient Position - Orthostatic VS: Sitting       Physical Exam  Vitals and nursing note reviewed.   Constitutional:       General: He is not in acute distress.     Appearance: He is well-developed.   HENT:      Head: Normocephalic and atraumatic.   Eyes:      Conjunctiva/sclera: Conjunctivae normal.   Cardiovascular:      Rate and Rhythm: Normal rate.   Pulmonary:       Effort: Pulmonary effort is normal. No respiratory distress.   Musculoskeletal:         General: Tenderness present. No swelling.      Comments: Range of motion somewhat limited due to pain.  Straight leg raise test positive on the left side with reproduction of pain to left left knee.  General tenderness to palpation of paraspinal musculature bilaterally in the lumbar spinal area.   Skin:     General: Skin is warm and dry.   Neurological:      Mental Status: He is alert.      Sensory: No sensory deficit.      Motor: No weakness.      Comments: Intact strength in bilateral lower extremities at the hip, knee, ankles   Psychiatric:         Mood and Affect: Mood normal.         ED Medications  Medications   ketorolac (TORADOL) injection 15 mg (15 mg Intramuscular Given 5/5/24 4032)       Diagnostic Studies  Results Reviewed       None                   No orders to display         Procedures  Procedures      ED Course                                       Medical Decision Making  68-year-old male patient presenting with 3 weeks of low back pain.  On initial evaluation, the patient appeared uncomfortable but otherwise with stable vitals.  Physical examination was significant for lumbar paraspinal muscle tenderness, positive straight leg test on the left side.  He had no focal neurologic deficit including weakness or sensory deficit and was able to ambulate with a somewhat antalgic gait.  Given this exam and the patient's history, concern is highest for pathology such as herniated disc with sciatica, piriformis syndrome.  Patient was given a single dose of Toradol while in the emergency department with improvement in symptoms.  Discussion about conservative management and supportive care was had, which the patient was in agreement with and he was ultimately discharged in stable condition with referral to comprehensive spine program and counseling to follow as soon as able with primary care physician to facilitate further  imaging of lumbar spine if needed.    Problems Addressed:  Acute bilateral low back pain with left-sided sciatica: acute illness or injury    Risk  Prescription drug management.          Disposition  Final diagnoses:   Acute bilateral low back pain with left-sided sciatica     Time reflects when diagnosis was documented in both MDM as applicable and the Disposition within this note       Time User Action Codes Description Comment    5/5/2024  2:54 PM Ann Marie Chávez Add [M54.42] Acute bilateral low back pain with left-sided sciatica           ED Disposition       ED Disposition   Discharge    Condition   Stable    Date/Time   Sun May 5, 2024  2:54 PM    Comment   Dominik Reed discharge to home/self care.                   Follow-up Information       Follow up With Specialties Details Why Contact Info    Geoff Castro MD Internal Medicine Schedule an appointment as soon as possible for a visit   [address] [place] [address]  [phone]              Discharge Medication List as of 5/5/2024  2:56 PM        CONTINUE these medications which have NOT CHANGED    Details   acetaminophen (TYLENOL) 500 mg tablet Take 1 tablet (500 mg total) by mouth every 6 (six) hours as needed for mild pain, Starting Mon 7/10/2023, OTC      albuterol (Proventil HFA) 90 mcg/act inhaler Inhale 2 puffs every 6 (six) hours as needed for wheezing, Starting Sun 7/9/2023, Normal      !! ALPRAZolam (XANAX) 0.25 mg tablet Take 1 tablet (0.25 mg total) by mouth daily as needed for anxiety, Starting Tue 1/23/2024, Normal      !! ALPRAZolam (XANAX) 0.25 mg tablet Take 1 tablet (0.25 mg total) by mouth daily as needed for anxiety, Starting Fri 3/22/2024, Normal      Blood Glucose Monitoring Suppl (ONE TOUCH ULTRA 2) w/Device KIT CONTOUR METER, USE 3 TIMES A DAY E11.65, Normal      dulaglutide (Trulicity) 3 MG/0.5ML injection Inject 0.5 mL (3 mg total) under the skin every 7 days, Starting Fri 3/22/2024, Normal      glucose blood  (OneTouch Ultra) test strip Use as instructed, Normal      Lancets MISC Use 3 (three) times a day Contour lancets E11.65, Starting Tue 1/3/2023, Normal      lisinopril (ZESTRIL) 5 mg tablet Take 1 tablet (5 mg total) by mouth daily, Starting Tue 1/23/2024, Normal      metFORMIN (GLUCOPHAGE) 1000 MG tablet Take 1 tablet (1,000 mg total) by mouth 2 (two) times a day with meals, Starting Tue 1/23/2024, Normal      patient supplied medication Multi-Betic vitamin  , Historical Med      simvastatin (ZOCOR) 20 mg tablet Take 1 tablet (20 mg total) by mouth daily at bedtime, Starting Tue 1/23/2024, Normal      umeclidinium-vilanterol (Anoro Ellipta) 62.5-25 mcg/actuation inhaler INHALE 1 PUFF DAILY, Starting Mon 2/12/2024, Until Wed 3/13/2024, Normal       !! - Potential duplicate medications found. Please discuss with provider.            PDMP Review         Value Time User    PDMP Reviewed  Yes 3/22/2024 12:32 PM Geoff Castro MD             ED Provider  Attending physically available and evaluated Dominik Reed. I managed the patient along with the ED Attending.    Electronically Signed by           Ann Marie Chávez DO  05/05/24 9860

## 2024-05-06 ENCOUNTER — HOSPITAL ENCOUNTER (OUTPATIENT)
Dept: RADIOLOGY | Facility: HOSPITAL | Age: 69
Discharge: HOME | End: 2024-05-06
Payer: COMMERCIAL

## 2024-05-06 ENCOUNTER — OFFICE VISIT (OUTPATIENT)
Dept: INTERNAL MEDICINE CLINIC | Facility: CLINIC | Age: 69
End: 2024-05-06
Payer: COMMERCIAL

## 2024-05-06 ENCOUNTER — APPOINTMENT (OUTPATIENT)
Dept: LAB | Facility: CLINIC | Age: 69
End: 2024-05-06
Payer: COMMERCIAL

## 2024-05-06 ENCOUNTER — NURSE TRIAGE (OUTPATIENT)
Dept: PHYSICAL THERAPY | Facility: OTHER | Age: 69
End: 2024-05-06

## 2024-05-06 VITALS
HEIGHT: 72 IN | OXYGEN SATURATION: 95 % | HEART RATE: 59 BPM | TEMPERATURE: 98.6 F | SYSTOLIC BLOOD PRESSURE: 144 MMHG | WEIGHT: 238 LBS | BODY MASS INDEX: 32.23 KG/M2 | DIASTOLIC BLOOD PRESSURE: 86 MMHG

## 2024-05-06 DIAGNOSIS — F33.9 DEPRESSION, RECURRENT (HCC): ICD-10-CM

## 2024-05-06 DIAGNOSIS — I10 ESSENTIAL HYPERTENSION, BENIGN: ICD-10-CM

## 2024-05-06 DIAGNOSIS — M54.50 LOW BACK PAIN AT MULTIPLE SITES: Primary | ICD-10-CM

## 2024-05-06 DIAGNOSIS — E11.9 TYPE 2 DIABETES, HBA1C GOAL < 7% (HCC): ICD-10-CM

## 2024-05-06 DIAGNOSIS — E78.2 MIXED HYPERLIPIDEMIA: ICD-10-CM

## 2024-05-06 DIAGNOSIS — J96.01 ACUTE RESPIRATORY FAILURE WITH HYPOXIA (HCC): ICD-10-CM

## 2024-05-06 DIAGNOSIS — M54.50 LOW BACK PAIN AT MULTIPLE SITES: ICD-10-CM

## 2024-05-06 DIAGNOSIS — M51.36 DDD (DEGENERATIVE DISC DISEASE), LUMBAR: ICD-10-CM

## 2024-05-06 DIAGNOSIS — M54.42 ACUTE LEFT-SIDED LOW BACK PAIN WITH LEFT-SIDED SCIATICA: Primary | ICD-10-CM

## 2024-05-06 PROBLEM — M51.369 DDD (DEGENERATIVE DISC DISEASE), LUMBAR: Status: ACTIVE | Noted: 2024-05-06

## 2024-05-06 LAB
ALBUMIN SERPL BCP-MCNC: 4.3 G/DL (ref 3.5–5)
ALP SERPL-CCNC: 33 U/L (ref 34–104)
ALT SERPL W P-5'-P-CCNC: 22 U/L (ref 7–52)
ANION GAP SERPL CALCULATED.3IONS-SCNC: 9 MMOL/L (ref 4–13)
AST SERPL W P-5'-P-CCNC: 18 U/L (ref 13–39)
BASOPHILS # BLD AUTO: 0.06 THOUSANDS/ÂΜL (ref 0–0.1)
BASOPHILS NFR BLD AUTO: 1 % (ref 0–1)
BILIRUB SERPL-MCNC: 0.44 MG/DL (ref 0.2–1)
BUN SERPL-MCNC: 16 MG/DL (ref 5–25)
CALCIUM SERPL-MCNC: 8.9 MG/DL (ref 8.4–10.2)
CHLORIDE SERPL-SCNC: 104 MMOL/L (ref 96–108)
CHOLEST SERPL-MCNC: 122 MG/DL
CO2 SERPL-SCNC: 26 MMOL/L (ref 21–32)
CREAT SERPL-MCNC: 0.77 MG/DL (ref 0.6–1.3)
CREAT UR-MCNC: 75.4 MG/DL
EOSINOPHIL # BLD AUTO: 0.36 THOUSAND/ÂΜL (ref 0–0.61)
EOSINOPHIL NFR BLD AUTO: 3 % (ref 0–6)
ERYTHROCYTE [DISTWIDTH] IN BLOOD BY AUTOMATED COUNT: 12 % (ref 11.6–15.1)
EST. AVERAGE GLUCOSE BLD GHB EST-MCNC: 117 MG/DL
GFR SERPL CREATININE-BSD FRML MDRD: 93 ML/MIN/1.73SQ M
GLUCOSE P FAST SERPL-MCNC: 94 MG/DL (ref 65–99)
HBA1C MFR BLD: 5.7 %
HCT VFR BLD AUTO: 53.6 % (ref 36.5–49.3)
HDLC SERPL-MCNC: 39 MG/DL
HGB BLD-MCNC: 17.6 G/DL (ref 12–17)
IMM GRANULOCYTES # BLD AUTO: 0.01 THOUSAND/UL (ref 0–0.2)
IMM GRANULOCYTES NFR BLD AUTO: 0 % (ref 0–2)
LDLC SERPL CALC-MCNC: 64 MG/DL (ref 0–100)
LYMPHOCYTES # BLD AUTO: 3.53 THOUSANDS/ÂΜL (ref 0.6–4.47)
LYMPHOCYTES NFR BLD AUTO: 34 % (ref 14–44)
MCH RBC QN AUTO: 32.4 PG (ref 26.8–34.3)
MCHC RBC AUTO-ENTMCNC: 32.8 G/DL (ref 31.4–37.4)
MCV RBC AUTO: 99 FL (ref 82–98)
MICROALBUMIN UR-MCNC: 7.8 MG/L
MICROALBUMIN/CREAT 24H UR: 10 MG/G CREATININE (ref 0–30)
MONOCYTES # BLD AUTO: 0.91 THOUSAND/ÂΜL (ref 0.17–1.22)
MONOCYTES NFR BLD AUTO: 9 % (ref 4–12)
NEUTROPHILS # BLD AUTO: 5.63 THOUSANDS/ÂΜL (ref 1.85–7.62)
NEUTS SEG NFR BLD AUTO: 53 % (ref 43–75)
NRBC BLD AUTO-RTO: 0 /100 WBCS
PLATELET # BLD AUTO: 252 THOUSANDS/UL (ref 149–390)
PMV BLD AUTO: 10 FL (ref 8.9–12.7)
POTASSIUM SERPL-SCNC: 4.3 MMOL/L (ref 3.5–5.3)
PROT SERPL-MCNC: 6.7 G/DL (ref 6.4–8.4)
RBC # BLD AUTO: 5.44 MILLION/UL (ref 3.88–5.62)
SODIUM SERPL-SCNC: 139 MMOL/L (ref 135–147)
TRIGL SERPL-MCNC: 95 MG/DL
TSH SERPL DL<=0.05 MIU/L-ACNC: 1.5 UIU/ML (ref 0.45–4.5)
WBC # BLD AUTO: 10.5 THOUSAND/UL (ref 4.31–10.16)

## 2024-05-06 PROCEDURE — 80053 COMPREHEN METABOLIC PANEL: CPT

## 2024-05-06 PROCEDURE — 80061 LIPID PANEL: CPT

## 2024-05-06 PROCEDURE — 72110 X-RAY EXAM L-2 SPINE 4/>VWS: CPT

## 2024-05-06 PROCEDURE — G2211 COMPLEX E/M VISIT ADD ON: HCPCS | Performed by: INTERNAL MEDICINE

## 2024-05-06 PROCEDURE — 83036 HEMOGLOBIN GLYCOSYLATED A1C: CPT

## 2024-05-06 PROCEDURE — 99214 OFFICE O/P EST MOD 30 MIN: CPT | Performed by: INTERNAL MEDICINE

## 2024-05-06 PROCEDURE — 84443 ASSAY THYROID STIM HORMONE: CPT

## 2024-05-06 PROCEDURE — 82043 UR ALBUMIN QUANTITATIVE: CPT

## 2024-05-06 PROCEDURE — 85025 COMPLETE CBC W/AUTO DIFF WBC: CPT

## 2024-05-06 PROCEDURE — 36415 COLL VENOUS BLD VENIPUNCTURE: CPT

## 2024-05-06 PROCEDURE — 82570 ASSAY OF URINE CREATININE: CPT

## 2024-05-06 RX ORDER — IBUPROFEN 800 MG/1
800 TABLET ORAL EVERY 8 HOURS SCHEDULED
Qty: 30 TABLET | Refills: 0 | Status: SHIPPED | OUTPATIENT
Start: 2024-05-06

## 2024-05-06 RX ORDER — CYCLOBENZAPRINE HCL 5 MG
5 TABLET ORAL 3 TIMES DAILY PRN
Qty: 15 TABLET | Refills: 0 | Status: SHIPPED | OUTPATIENT
Start: 2024-05-06

## 2024-05-06 NOTE — PROGRESS NOTES
Assessment/Plan:             1. Low back pain at multiple sites  -     cyclobenzaprine (FLEXERIL) 5 mg tablet; Take 1 tablet (5 mg total) by mouth 3 (three) times a day as needed for muscle spasms  -     ibuprofen (MOTRIN) 800 mg tablet; Take 1 tablet (800 mg total) by mouth every 8 (eight) hours  -     XR spine lumbar minimum 4 views non injury; Future; Expected date: 05/06/2024  -     Ambulatory referral to Physical Therapy; Future    2. Mixed hyperlipidemia    3. Essential hypertension, benign    4. Type 2 diabetes, HbA1c goal < 7% ([facility])    5. DDD (degenerative disc disease), lumbar    6. Acute respiratory failure with hypoxia ([facility])    7. Depression, recurrent ([facility])           Subjective:      Patient ID: Dominik Reed is a 68 y.o. male.    Low back pain, for 3 weeks, no trauma, been to ER, ER record reviewed, also blood test and urine test done this morning 5/6/2024 test discussed with him, feels weak on the left leg, pain goes to left leg, upper thigh, getting better        The following portions of the patient's history were reviewed and updated as appropriate: He  has a past medical history of Acquired hypothyroidism, Anxiety, Anxiety disorder, Cigarette smoker, Colon cancer screening declined, Diabetes mellitus ([facility]), Diabetes mellitus, type 2 ([facility]), Dyslipidemia, Essential hypertension, benign, Hyperlipidemia, Hypertension, Kidney stone, Obesity, morbid ([facility]) (01/12/2021), and Type II diabetes mellitus, uncontrolled.  He   Patient Active Problem List    Diagnosis Date Noted    Low back pain at multiple sites 05/06/2024    DDD (degenerative disc disease), lumbar 05/06/2024    Acute respiratory failure with hypoxia ([facility]) 02/02/2024    Chronic obstructive pulmonary disease, unspecified COPD type ([facility]) 02/02/2024    Type 2 diabetes, HbA1c goal < 7% ([facility]) 02/01/2024    Snoring 07/13/2023    Hypoxia 07/10/2023    Acute appendicitis 07/08/2023    Clinical sepsis ([facility]) 07/08/2023    Depression, recurrent ([facility])  01/24/2023    Aspirin long-term use 06/27/2022    GI bleed 06/26/2022    Hypotension 06/26/2022    Melena 06/26/2022    Needs flu shot 09/17/2021    Body mass index 34.0-34.9, adult 01/12/2021    Diabetic eye exam (HCC) 01/12/2021    Dyslipidemia     Diabetes mellitus, type 2 ([facility])     Colon cancer screening declined     Dysthymic disorder 09/16/2020    Body mass index 36.0-36.9, adult 09/16/2020    Hypertension     Mixed hyperlipidemia     Essential hypertension, benign     Uncontrolled type 2 diabetes mellitus with hyperglycemia ([facility])     Anxiety disorder      He  has a past surgical history that includes Kidney stone surgery; Cardiac catheterization; and APPENDECTOMY LAPAROSCOPIC (N/A, 7/8/2023).  His family history includes Heart attack in his father; Heart disease in his father.  He  reports that he has been smoking cigarettes. He started smoking about 45 years ago. He has a 19.9 pack-year smoking history. He has never been exposed to tobacco smoke. He has never used smokeless tobacco. He reports that he does not currently use alcohol after a past usage of about 1.0 standard drink of alcohol per week. He reports current drug use. Drug: Marijuana.  Current Outpatient Medications   Medication Sig Dispense Refill    Blood Glucose Monitoring Suppl (ONE TOUCH ULTRA 2) w/Device KIT CONTOUR METER, USE 3 TIMES A DAY E11.65 1 kit 0    cyclobenzaprine (FLEXERIL) 5 mg tablet Take 1 tablet (5 mg total) by mouth 3 (three) times a day as needed for muscle spasms 15 tablet 0    dulaglutide (Trulicity) 3 MG/0.5ML injection Inject 0.5 mL (3 mg total) under the skin every 7 days 6 mL 0    glucose blood (OneTouch Ultra) test strip Use as instructed 100 strip 0    ibuprofen (MOTRIN) 800 mg tablet Take 1 tablet (800 mg total) by mouth every 8 (eight) hours 30 tablet 0    Lancets MISC Use 3 (three) times a day Contour lancets E11.65 100 each 0    lisinopril (ZESTRIL) 5 mg tablet Take 1 tablet (5 mg total) by mouth daily 90 tablet 0     metFORMIN (GLUCOPHAGE) 1000 MG tablet Take 1 tablet (1,000 mg total) by mouth 2 (two) times a day with meals 180 tablet 0    patient supplied medication Multi-Betic vitamin        simvastatin (ZOCOR) 20 mg tablet Take 1 tablet (20 mg total) by mouth daily at bedtime 90 tablet 1    acetaminophen (TYLENOL) 500 mg tablet Take 1 tablet (500 mg total) by mouth every 6 (six) hours as needed for mild pain (Patient not taking: Reported on 9/19/2023)  0    albuterol (Proventil HFA) 90 mcg/act inhaler Inhale 2 puffs every 6 (six) hours as needed for wheezing (Patient not taking: Reported on 9/19/2023) 6.7 g 0    ALPRAZolam (XANAX) 0.25 mg tablet Take 1 tablet (0.25 mg total) by mouth daily as needed for anxiety (Patient not taking: Reported on 5/6/2024) 30 tablet 0    ALPRAZolam (XANAX) 0.25 mg tablet Take 1 tablet (0.25 mg total) by mouth daily as needed for anxiety (Patient not taking: Reported on 5/6/2024) 30 tablet 0    umeclidinium-vilanterol (Anoro Ellipta) 62.5-25 mcg/actuation inhaler INHALE 1 PUFF DAILY (Patient not taking: Reported on 5/6/2024) 60 each 5     No current facility-administered medications for this visit.     Current Outpatient Medications on File Prior to Visit   Medication Sig    Blood Glucose Monitoring Suppl (ONE TOUCH ULTRA 2) w/Device KIT CONTOUR METER, USE 3 TIMES A DAY E11.65    dulaglutide (Trulicity) 3 MG/0.5ML injection Inject 0.5 mL (3 mg total) under the skin every 7 days    glucose blood (OneTouch Ultra) test strip Use as instructed    Lancets MISC Use 3 (three) times a day Contour lancets E11.65    lisinopril (ZESTRIL) 5 mg tablet Take 1 tablet (5 mg total) by mouth daily    metFORMIN (GLUCOPHAGE) 1000 MG tablet Take 1 tablet (1,000 mg total) by mouth 2 (two) times a day with meals    patient supplied medication Multi-Betic vitamin      simvastatin (ZOCOR) 20 mg tablet Take 1 tablet (20 mg total) by mouth daily at bedtime    acetaminophen (TYLENOL) 500 mg tablet Take 1 tablet (500 mg  total) by mouth every 6 (six) hours as needed for mild pain (Patient not taking: Reported on 9/19/2023)    albuterol (Proventil HFA) 90 mcg/act inhaler Inhale 2 puffs every 6 (six) hours as needed for wheezing (Patient not taking: Reported on 9/19/2023)    ALPRAZolam (XANAX) 0.25 mg tablet Take 1 tablet (0.25 mg total) by mouth daily as needed for anxiety (Patient not taking: Reported on 5/6/2024)    ALPRAZolam (XANAX) 0.25 mg tablet Take 1 tablet (0.25 mg total) by mouth daily as needed for anxiety (Patient not taking: Reported on 5/6/2024)    umeclidinium-vilanterol (Anoro Ellipta) 62.5-25 mcg/actuation inhaler INHALE 1 PUFF DAILY (Patient not taking: Reported on 5/6/2024)     No current facility-administered medications on file prior to visit.     He has No Known Allergies..    Review of Systems   Constitutional:  Negative for chills and fever.   HENT:  Negative for congestion, ear pain and sore throat.    Eyes:  Negative for pain.   Respiratory:  Negative for cough and shortness of breath.    Cardiovascular:  Negative for chest pain and leg swelling.   Gastrointestinal:  Negative for abdominal pain, nausea and vomiting.   Endocrine: Negative for polyuria.   Genitourinary:  Negative for difficulty urinating, frequency and urgency.   Musculoskeletal:  Positive for back pain. Negative for arthralgias.   Skin:  Negative for rash.   Neurological:  Negative for weakness and headaches.   Psychiatric/Behavioral:  Negative for sleep disturbance. The patient is not nervous/anxious.          Objective:      /86 (BP Location: Left arm, Patient Position: Sitting, Cuff Size: Large)   Pulse 59   Temp 98.6 °F (37 °C)   Ht 6' (1.829 m)   Wt 108 kg (238 lb)   SpO2 95%   BMI 32.28 kg/m²     Recent Results (from the past 1344 hour(s))   CBC and differential    Collection Time: 05/06/24  6:41 AM   Result Value Ref Range    WBC 10.50 (H) 4.31 - 10.16 Thousand/uL    RBC 5.44 3.88 - 5.62 Million/uL    Hemoglobin 17.6 (H)  12.0 - 17.0 g/dL    Hematocrit 53.6 (H) 36.5 - 49.3 %    MCV 99 (H) 82 - 98 fL    MCH 32.4 26.8 - 34.3 pg    MCHC 32.8 31.4 - 37.4 g/dL    RDW 12.0 11.6 - 15.1 %    MPV 10.0 8.9 - 12.7 fL    Platelets 252 149 - 390 Thousands/uL    nRBC 0 /100 WBCs    Segmented % 53 43 - 75 %    Immature Grans % 0 0 - 2 %    Lymphocytes % 34 14 - 44 %    Monocytes % 9 4 - 12 %    Eosinophils Relative 3 0 - 6 %    Basophils Relative 1 0 - 1 %    Absolute Neutrophils 5.63 1.85 - 7.62 Thousands/µL    Absolute Immature Grans 0.01 0.00 - 0.20 Thousand/uL    Absolute Lymphocytes 3.53 0.60 - 4.47 Thousands/µL    Absolute Monocytes 0.91 0.17 - 1.22 Thousand/µL    Eosinophils Absolute 0.36 0.00 - 0.61 Thousand/µL    Basophils Absolute 0.06 0.00 - 0.10 Thousands/µL   Comprehensive metabolic panel    Collection Time: 05/06/24  6:41 AM   Result Value Ref Range    Sodium 139 135 - 147 mmol/L    Potassium 4.3 3.5 - 5.3 mmol/L    Chloride 104 96 - 108 mmol/L    CO2 26 21 - 32 mmol/L    ANION GAP 9 4 - 13 mmol/L    BUN 16 5 - 25 mg/dL    Creatinine 0.77 0.60 - 1.30 mg/dL    Glucose, Fasting 94 65 - 99 mg/dL    Calcium 8.9 8.4 - 10.2 mg/dL    AST 18 13 - 39 U/L    ALT 22 7 - 52 U/L    Alkaline Phosphatase 33 (L) 34 - 104 U/L    Total Protein 6.7 6.4 - 8.4 g/dL    Albumin 4.3 3.5 - 5.0 g/dL    Total Bilirubin 0.44 0.20 - 1.00 mg/dL    eGFR 93 ml/min/1.73sq m   Lipid Panel with Direct LDL reflex    Collection Time: 05/06/24  6:41 AM   Result Value Ref Range    Cholesterol 122 See Comment mg/dL    Triglycerides 95 See Comment mg/dL    HDL, Direct 39 (L) >=40 mg/dL    LDL Calculated 64 0 - 100 mg/dL   Albumin / creatinine urine ratio    Collection Time: 05/06/24  6:41 AM   Result Value Ref Range    Creatinine, Ur 75.4 Reference range not established. mg/dL    Albumin,U,Random 7.8 <20.0 mg/L    Albumin Creat Ratio 10 0 - 30 mg/g creatinine   TSH, 3rd generation    Collection Time: 05/06/24  6:41 AM   Result Value Ref Range    TSH 3RD GENERATON 1.503 0.450  - 4.500 uIU/mL   Hemoglobin A1C    Collection Time: 05/06/24  6:41 AM   Result Value Ref Range    Hemoglobin A1C 5.7 (H) Normal 4.0-5.6%; PreDiabetic 5.7-6.4%; Diabetic >=6.5%; Glycemic control for adults with diabetes <7.0% %     mg/dl        Physical Exam  Constitutional:       Appearance: Normal appearance.   HENT:      Head: Normocephalic.      Right Ear: External ear normal.      Left Ear: External ear normal.      Nose: Nose normal. No congestion.      Mouth/Throat:      Mouth: Mucous membranes are moist.      Pharynx: Oropharynx is clear. No oropharyngeal exudate or posterior oropharyngeal erythema.   Eyes:      Extraocular Movements: Extraocular movements intact.      Conjunctiva/sclera: Conjunctivae normal.   Cardiovascular:      Rate and Rhythm: Normal rate and regular rhythm.      Heart sounds: Normal heart sounds. No murmur heard.  Pulmonary:      Effort: Pulmonary effort is normal.      Breath sounds: Normal breath sounds. No wheezing or rales.   Abdominal:      General: Abdomen is flat. There is no distension.      Palpations: Abdomen is soft.      Tenderness: There is no abdominal tenderness.   Musculoskeletal:      Cervical back: Normal range of motion and neck supple.      Right lower leg: No edema.      Left lower leg: No edema.      Comments: Low back exam does left-sided paraspinal spasm and tenderness present, SLR positive on the left side, strength 4 out of 5,   Lymphadenopathy:      Cervical: No cervical adenopathy.   Skin:     General: Skin is warm.   Neurological:      General: No focal deficit present.      Mental Status: He is alert and oriented to person, place, and time.

## 2024-05-06 NOTE — TELEPHONE ENCOUNTER
Patient needs to make an appointment with provider can you give him a call to book a same day appointment for today I am unauthorized to overbook.

## 2024-05-06 NOTE — TELEPHONE ENCOUNTER
Additional Information   Negative: Is this related to a work injury?   Negative: Is this related to an MVA?   Negative: Are you currently recieving homecare services?   Negative: Has the patient had unexplained weight loss?   Negative: Does the patient have a fever?   Negative: Is the patient experiencing urine retention?   Negative: Is the patient experiencing acute drop foot or paralysis?   Negative: Has the patient experienced major trauma? (fall from height, high speed collision, direct blow to spine) and is also experiencing nausea, light-headedness, or loss of consciousness?   Negative: Is the patient experiencing blood in sputum?   Negative: Is this a chronic condition?    Background - Initial Assessment  Clinical complaint: left lower back pain which radiates to the left knee. States he has spasms on the left thigh. No numbness or tingling. No history of back issues. NKI.states it has been present for 3 weeks.  Date of onset: 3 weeks  Frequency of pain: constant  Quality of pain: sharp and stabbing    Protocols used: [facility] Protocol    [facility] was reviewed in detail and what we can provide for their back pain.  Patient is agreeable to being triaged and would like to proceed with Physical Therapy.    Referral was placed for Physical Therapy at the [facility] site. Patients information was sent to the  to make evaluation appointment. Patient made aware that the PT office  will be calling to schedule the appointment.  Patient was provided with the phone number to the PT office.    No further questions and/or concerns were voiced by the patient at this time. Patient states understanding of the referral that was placed.    Referral Closed.

## 2024-05-07 ENCOUNTER — TELEPHONE (OUTPATIENT)
Age: 69
End: 2024-05-07

## 2024-05-09 ENCOUNTER — EVALUATION (OUTPATIENT)
Dept: PHYSICAL THERAPY | Facility: REHABILITATION | Age: 69
End: 2024-05-09
Payer: COMMERCIAL

## 2024-05-09 DIAGNOSIS — M54.42 ACUTE LEFT-SIDED LOW BACK PAIN WITH LEFT-SIDED SCIATICA: Primary | ICD-10-CM

## 2024-05-09 PROCEDURE — 97162 PT EVAL MOD COMPLEX 30 MIN: CPT | Performed by: PHYSICAL THERAPIST

## 2024-05-09 PROCEDURE — 97112 NEUROMUSCULAR REEDUCATION: CPT | Performed by: PHYSICAL THERAPIST

## 2024-05-09 NOTE — PROGRESS NOTES
PT Evaluation     Today's date: 2024  Patient name: Dominik Reed  : 1955  MRN: [id number]  Referring provider: Tristan Harris PT  Dx:   Encounter Diagnosis     ICD-10-CM    1. Acute left-sided low back pain with left-sided sciatica  M54.42 Ambulatory referral to PT spine          Start Time: 0800  Stop Time: 0855  Total time in clinic (min): 55 minutes    Assessment  Assessment details: Dominik Reed is a 68 year-old male who presents to physical therapy with a chief complaint of low back pain acute. Primary movement impairment is mobility deficit into flexion, along with poor movement coordination, poor postural awareness, and poor core strength. Current signs and symptoms consistent with lumbar radiculopathy of left side. No red flags, and an intact lower quarter screen outside of mild strength loss of L3-L4 distribution. At this time, the patient would benefit from skilled physical therapy to address his current deficits, improve his quality of life, and restore his PLOF. No further referral is warranted at this time based upon examination results.   Impairments: abnormal coordination, abnormal gait, abnormal muscle firing, abnormal muscle tone, abnormal or restricted ROM, abnormal movement, activity intolerance, difficulty understanding, impaired balance, impaired physical strength, lacks appropriate home exercise program, pain with function, poor posture  and poor body mechanics    Symptom irritability: moderateUnderstanding of Dx/Px/POC: good   Prognosis: good    Goals  Impairment Based Goals:  1. Decreased pain by 50% in 4 weeks.  2. Improve ROM to WFL by discharge.   3. Improve strength by 1/2 measure in 6 weeks.   4. Improve FOTO greater than predicted outcome score by discharge.      Functional Based Goals: To be met upon discharge  1. Patient will be able to return to fishing and mountain biking.   2. Patient will be fully independent with HEP by discharge  3. Patient will be able to manage  symptoms independently.       Plan  Patient would benefit from: skilled physical therapy  Referral necessary: No  Planned therapy interventions: abdominal trunk stabilization, activity modification, balance, balance/weight bearing training, behavior modification, breathing training, body mechanics training, flexibility, functional ROM exercises, gait training, graded activity, graded exercise, graded motor, home exercise program, therapeutic exercise, therapeutic activities, stretching, strengthening, postural training, patient education, neuromuscular re-education, nerve gliding, motor coordination training, massage, manual therapy, kinesiology taping, joint mobilization, IASTM, compression and coordination  Frequency: 2x week  Plan of Care beginning date: 5/9/2024  Plan of Care expiration date: 7/18/2024  Treatment plan discussed with: patient      Subjective Evaluation    History of Present Illness  Mechanism of injury: Woke up with shooting pain in my back just above the pelvis on the left side that shot down into my leg 3 weeks ago. A lot of the cramping was in the top area of the knee and outside of the left thigh. I was fishing and casting a lot, and slept on a sofa at night which was offset, so not sure if that has something to do with it. I am unable to pick out a specific thing that brought on the pain, but I just woke up with it. I am still hoping I can fish. For about 3 weeks, I have been trying to manage it with Ibuprofen about every 8 hours, and also using some Icy Hot. I did also try to use a heating pad and ice. Over the past 3 weeks, it has gotten somewhat better, but there is still the pain. I have had some low back pain the past, but nothing that has gone into the leg before. I have tried moving different ways to see if I can get some relief, but I haven't been successful.     Hobbies: Fishing, mountain biking, doing yard work     Location of Symptoms: Left thigh, left side of lower back, and  outside of the left thigh     Symptoms Better With: rest, change in position  Symptoms Worse With: Bending forward, standing, walking  Patient Goals  Patient goals for therapy: decreased pain, improved balance, increased motion, return to sport/leisure activities, independence with ADLs/IADLs and increased strength    Pain  Current pain ratin  At best pain ratin  At worst pain rating: 10  Quality: discomfort, dull ache, tight and pulling (cramping)    Treatments  Previous treatment: medication        Objective     Concurrent Complaints  Positive for night pain and disturbed sleep (especially the first two weeks). Negative for bladder dysfunction, bowel dysfunction, saddle (S4) numbness, ulcer, history of cancer, history of trauma and infection    General Comments:      Lumbar Comments  Postural Observation   Sitting: neutral  Standing: lordotic  Postural Change: Improved in upright posture  Lateral Shift: nil      Myotomes  Strength WNL bilaterally, expect mild strength deficit of left L3-L4    Dermatomes  Sensation intact bilaterally    Reflexes  R Patellar Reflex: (2+)  L Patellar Reflex: (2+)  R Achilles Reflex: (2+)  L Achilles Reflex: (2+)    Neurodynamic Testing  Slump Test: +L  SLR: +L       Lumbar Active Range of Motion  Movement Loss Symptoms Jose Mod Min Nil Symptoms  Flexion      X  Reproduced left sided low back pain     Extension    x       R SG      X  Reproduced left sided low back pain      L SG      X  Reproduced left sided low back pain     Other         McNeil Assessment  Rep EIL: Decreased/Better        Flowsheet Rows      Flowsheet Row Most Recent Value   PT/OT G-Codes    Current Score 37   Projected Score 58               POC expires Unit limit Auth Expiration date PT/OT + Visit Limit?   2024             Visit/Unit Tracking  AUTH Status:  Date         Pending - Rickey  Rep Used 1         Remaining                Precautions: History of type II DM, History of  HBP      Manuals 5/12                                                                Neuro Re-Ed             Pallof             Dead Bug             Bird Dog             TA with Head Lift             Modified side plank             Patient Ed 25'                         Ther Ex             NuStep             PPU             Prone Prop             Suitcase Carry              Front Rack Carry             Rack Pull                                       Ther Activity                                       Gait Training                                       Modalities

## 2024-05-13 ENCOUNTER — OFFICE VISIT (OUTPATIENT)
Dept: PHYSICAL THERAPY | Facility: REHABILITATION | Age: 69
End: 2024-05-13
Payer: COMMERCIAL

## 2024-05-13 DIAGNOSIS — M54.42 ACUTE LEFT-SIDED LOW BACK PAIN WITH LEFT-SIDED SCIATICA: Primary | ICD-10-CM

## 2024-05-13 PROCEDURE — 97112 NEUROMUSCULAR REEDUCATION: CPT | Performed by: PHYSICAL THERAPIST

## 2024-05-13 PROCEDURE — 97110 THERAPEUTIC EXERCISES: CPT | Performed by: PHYSICAL THERAPIST

## 2024-05-13 NOTE — PROGRESS NOTES
Daily Note     Today's date: 2024  Patient name: Dominik Reed  : 1955  MRN: [id number]  Referring provider: Tristan Harris PT  Dx:   Encounter Diagnosis     ICD-10-CM    1. Acute left-sided low back pain with left-sided sciatica  M54.42           Start Time: 0930  Stop Time: 0958  Total time in clinic (min): 28 minutes    Subjective: No big changes with how I have been feeling.       Objective: See treatment diary below      Assessment: Tolerated treatment well. Force progression to sag and lock today well tolerated. Educated to increase frequency of HEP today. Patient would benefit from continued PT. Patient 1:1 with PT for 23 minutes.       Plan: Continue per plan of care.      POC expires Auth Expiration date PT/OT + Visit Limit?   2024 12        Visit/Unit Tracking  AUTH Status:  Date        Pending - Rickey  Rep Used 1 2        Remaining  11 10             Precautions: History of type II DM, History of HBP      Manuals                                                                Neuro Re-Ed             Pallof             Dead Bug             Bird Dog             TA with Head Lift             Modified side plank             Patient Ed 25' 10'                        Ther Ex             NuStep  10' L7           PPU  Sag and lock 3x10           Prone Prop             Suitcase Carry              Front Rack Carry             Rack Pull                                       Ther Activity                                       Gait Training                                       Modalities

## 2024-05-16 ENCOUNTER — OFFICE VISIT (OUTPATIENT)
Dept: PHYSICAL THERAPY | Facility: REHABILITATION | Age: 69
End: 2024-05-16
Payer: COMMERCIAL

## 2024-05-16 ENCOUNTER — RA CDI HCC (OUTPATIENT)
Dept: OTHER | Facility: HOSPITAL | Age: 69
End: 2024-05-16

## 2024-05-16 DIAGNOSIS — M54.42 ACUTE LEFT-SIDED LOW BACK PAIN WITH LEFT-SIDED SCIATICA: Primary | ICD-10-CM

## 2024-05-16 PROCEDURE — 97110 THERAPEUTIC EXERCISES: CPT | Performed by: PHYSICAL THERAPIST

## 2024-05-16 PROCEDURE — 97140 MANUAL THERAPY 1/> REGIONS: CPT | Performed by: PHYSICAL THERAPIST

## 2024-05-16 NOTE — PROGRESS NOTES
Daily Note     Today's date: 2024  Patient name: Dominik Reed  : 1955  MRN: [id number]  Referring provider: Tristan Harris, DEYSI  Dx:   Encounter Diagnosis     ICD-10-CM    1. Acute left-sided low back pain with left-sided sciatica  M54.42           Start Time: 0846  Stop Time: 0915  Total time in clinic (min): 29 minutes    Subjective: My back is feeling better.       Objective: See treatment diary below      Assessment: Tolerated treatment well. Patient continues to be appropriately challenged at current therapeutic volume and intensity. Patient would benefit from continued PT. Patient 1:1 with PT for 29 minutes.       Plan: Continue per plan of care.      POC expires Auth Expiration date PT/OT + Visit Limit?   2024 12        Visit/Unit Tracking  AUTH Status:  Date       Pending - Rickey  Rep Used 1 2 3       Remaining  11 10 9            Precautions: History of type II DM, History of HBP      Manuals           Lumbar    SE                                                 Neuro Re-Ed             Pallof             Dead Bug             Bird Dog             TA with Head Lift             Modified side plank             Patient Ed 25' 10'           Pball press with arm raise standing   2x15          Ther Ex             NuStep  10' L7 10' L7          PPU  Sag and lock 3x10 3x10          Prone Prop             Suitcase Carry              Front Rack Carry             Rack Pull                                       Ther Activity                                       Gait Training                                       Modalities

## 2024-05-17 ENCOUNTER — RA CDI HCC (OUTPATIENT)
Dept: OTHER | Facility: HOSPITAL | Age: 69
End: 2024-05-17

## 2024-05-20 ENCOUNTER — OFFICE VISIT (OUTPATIENT)
Dept: PHYSICAL THERAPY | Facility: REHABILITATION | Age: 69
End: 2024-05-20
Payer: COMMERCIAL

## 2024-05-20 DIAGNOSIS — M54.42 ACUTE LEFT-SIDED LOW BACK PAIN WITH LEFT-SIDED SCIATICA: Primary | ICD-10-CM

## 2024-05-20 PROCEDURE — 97112 NEUROMUSCULAR REEDUCATION: CPT | Performed by: PHYSICAL THERAPIST

## 2024-05-20 PROCEDURE — 97110 THERAPEUTIC EXERCISES: CPT | Performed by: PHYSICAL THERAPIST

## 2024-05-20 NOTE — PROGRESS NOTES
PT Discharge    Today's date: 2024  Patient name: Dominik Reed  : 1955  MRN: [id number]  Referring provider: Tristan Harris PT  Dx:   Encounter Diagnosis     ICD-10-CM    1. Acute left-sided low back pain with left-sided sciatica  M54.42           Start Time: 0745  Stop Time: 0810  Total time in clinic (min): 25 minutes    Subjective: Back is feeling much better overall. Not really having issues anymore.     Pain: 0/10       Objective: See treatment diary below    Lumbar Spine AROM: WNL all planes  Strength: Gross 5/5   Palpation: No tenderness  Slump: Negative       Assessment: Tolerated treatment well. Patient will now be discharged to independent HEP.     Goals - ALL MET  Impairment Based Goals:  1. Decreased pain by 50% in 4 weeks.  2. Improve ROM to WFL by discharge.   3. Improve strength by 1/2 measure in 6 weeks.   4. Improve FOTO greater than predicted outcome score by discharge.      Functional Based Goals: To be met upon discharge  1. Patient will be able to return to fishing and mountain biking.   2. Patient will be fully independent with HEP by discharge  3. Patient will be able to manage symptoms independently.     Plan: Discharge to HEP.      POC expires Auth Expiration date PT/OT + Visit Limit?   2024 12        Visit/Unit Tracking  AUTH Status:  Date      Pending - [facility] Rep Used 1 2 3 4      Remaining  11 10 9 8           Precautions: History of type II DM, History of HBP      Manuals          Lumbar    SE                                                 Neuro Re-Ed             Pallof             Dead Bug             Bird Dog             TA with Head Lift             Modified side plank             Patient Ed 25' 10'  15'         Pball press with arm raise standing   2x15 2x15         Ther Ex             NuStep  10' L7 10' L7 10' L7         PPU  Sag and lock 3x10 3x10 3x10         Prone Prop             Suitcase Carry               Front Rack Carry             Rack Pull                                       Ther Activity                                       Gait Training                                       Modalities

## 2024-05-23 ENCOUNTER — APPOINTMENT (OUTPATIENT)
Dept: PHYSICAL THERAPY | Facility: REHABILITATION | Age: 69
End: 2024-05-23
Payer: COMMERCIAL

## 2024-05-28 ENCOUNTER — OFFICE VISIT (OUTPATIENT)
Dept: INTERNAL MEDICINE CLINIC | Facility: CLINIC | Age: 69
End: 2024-05-28
Payer: COMMERCIAL

## 2024-05-28 VITALS
BODY MASS INDEX: 30.88 KG/M2 | HEIGHT: 72 IN | TEMPERATURE: 98.6 F | SYSTOLIC BLOOD PRESSURE: 134 MMHG | DIASTOLIC BLOOD PRESSURE: 82 MMHG | OXYGEN SATURATION: 94 % | WEIGHT: 228 LBS | HEART RATE: 84 BPM

## 2024-05-28 DIAGNOSIS — F34.1 DYSTHYMIC DISORDER: ICD-10-CM

## 2024-05-28 DIAGNOSIS — Z00.00 MEDICARE ANNUAL WELLNESS VISIT, SUBSEQUENT: Primary | ICD-10-CM

## 2024-05-28 DIAGNOSIS — J44.9 CHRONIC OBSTRUCTIVE PULMONARY DISEASE, UNSPECIFIED COPD TYPE (HCC): ICD-10-CM

## 2024-05-28 DIAGNOSIS — E78.2 MIXED HYPERLIPIDEMIA: ICD-10-CM

## 2024-05-28 DIAGNOSIS — I10 ESSENTIAL HYPERTENSION, BENIGN: ICD-10-CM

## 2024-05-28 DIAGNOSIS — E11.65 UNCONTROLLED TYPE 2 DIABETES MELLITUS WITH HYPERGLYCEMIA (HCC): ICD-10-CM

## 2024-05-28 DIAGNOSIS — E53.8 B12 DEFICIENCY: ICD-10-CM

## 2024-05-28 PROCEDURE — G0439 PPPS, SUBSEQ VISIT: HCPCS | Performed by: INTERNAL MEDICINE

## 2024-05-28 RX ORDER — UMECLIDINIUM BROMIDE AND VILANTEROL TRIFENATATE 62.5; 25 UG/1; UG/1
1 POWDER RESPIRATORY (INHALATION) DAILY
Qty: 180 EACH | Refills: 1 | Status: SHIPPED | OUTPATIENT
Start: 2024-05-28 | End: 2024-06-27

## 2024-05-28 NOTE — PROGRESS NOTES
Diabetic Foot Exam    Patient's shoes and socks removed.    Right Foot/Ankle   Right Foot Inspection  Skin Exam: skin normal. Skin not intact, no dry skin, no warmth, no callus, no erythema, no maceration, no abnormal color, no pre-ulcer, no ulcer and no callus.     Toe Exam: ROM and strength within normal limits. No swelling, no tenderness, erythema and  no right toe deformity    Sensory   Monofilament testing: intact    Vascular  Capillary refills: < 3 seconds  The right DP pulse is 2+. The right PT pulse is 2+.     Left Foot/Ankle  Left Foot Inspection  Skin Exam: skin normal. Skin not intact, no dry skin, no warmth, no erythema, no maceration, normal color, no pre-ulcer, no ulcer and no callus.     Toe Exam: ROM and strength within normal limits. No swelling, no tenderness, no erythema and no left toe deformity.     Sensory   Monofilament testing: intact    Vascular  Capillary refills: < 3 seconds  The left DP pulse is 2+. The left PT pulse is 2+.     Assign Risk Category  No deformity present  No loss of protective sensation  No weak pulses  Risk: 0

## 2024-05-28 NOTE — PROGRESS NOTES
Ambulatory Visit  Name: Dominik Reed      : 1955      MRN: [id number]  Encounter Provider: Geoff Castro MD  Encounter Date: 2024   Encounter department: [facility]    Assessment & Plan   1. Medicare annual wellness visit, subsequent  2. Chronic obstructive pulmonary disease, unspecified COPD type (HCC)  -     umeclidinium-vilanterol (Anoro Ellipta) 62.5-25 mcg/actuation inhaler; Inhale 1 puff daily  3. Uncontrolled type 2 diabetes mellitus with hyperglycemia (HCC)  -     Albumin / creatinine urine ratio; Future  -     Lipid Panel with Direct LDL reflex; Future  -     TSH, 3rd generation; Future  -     CBC and differential; Future  -     Comprehensive metabolic panel; Future  -     Hemoglobin A1C; Future  4. Mixed hyperlipidemia  -     Lipid Panel with Direct LDL reflex; Future  -     TSH, 3rd generation; Future  -     Comprehensive metabolic panel; Future  5. Essential hypertension, benign  -     Lipid Panel with Direct LDL reflex; Future  -     TSH, 3rd generation; Future  -     CBC and differential; Future  -     Comprehensive metabolic panel; Future  6. Dysthymic disorder  7. B12 deficiency  -     Vitamin B12; Future      Depression Screening and Follow-up Plan: Patient was screened for depression during today's encounter. They screened negative with a PHQ-9 score of 0.      Preventive health issues were discussed with patient, and age appropriate screening tests were ordered as noted in patient's After Visit Summary. Personalized health advice and appropriate referrals for health education or preventive services given if needed, as noted in patient's After Visit Summary.    History of Present Illness     W       Patient Care Team:  Geoff Castro MD as PCP - General (Internal Medicine)    Review of Systems   Constitutional:  Negative for chills and fever.   HENT:  Negative for congestion, ear pain and sore throat.    Eyes:  Negative for pain.   Respiratory:  Negative for  cough and shortness of breath.    Cardiovascular:  Negative for chest pain and leg swelling.   Gastrointestinal:  Negative for abdominal pain, nausea and vomiting.   Endocrine: Negative for polyuria.   Genitourinary:  Negative for difficulty urinating, frequency and urgency.   Musculoskeletal:  Negative for arthralgias and back pain.   Skin:  Negative for rash.   Neurological:  Negative for weakness and headaches.   Psychiatric/Behavioral:  Negative for sleep disturbance. The patient is not nervous/anxious.      Medical History Reviewed by provider this encounter:  Tobacco  Allergies  Meds  Problems  Med Hx  Surg Hx  Fam Hx       Annual Wellness Visit Questionnaire   Dominik is here for his Subsequent Wellness visit. Last Medicare Wellness visit information reviewed, patient interviewed and updates made to the record today.      Health Risk Assessment:   Patient rates overall health as good. Patient feels that their physical health rating is same. Patient is satisfied with their life. Eyesight was rated as same. Hearing was rated as same. Patient feels that their emotional and mental health rating is same. Patients states they are never, rarely angry. Patient states they are never, rarely unusually tired/fatigued. Pain experienced in the last 7 days has been some. Patient's pain rating has been 2/10. Patient states that he has experienced no weight loss or gain in last 6 months.     Depression Screening:   PHQ-9 Score: 0      Fall Risk Screening:   In the past year, patient has experienced: history of falling in past year    Number of falls: 1  Injured during fall?: No    Feels unsteady when standing or walking?: No    Worried about falling?: No      Home Safety:  Patient does not have trouble with stairs inside or outside of their home. Patient has working smoke alarms and has working carbon monoxide detector. Home safety hazards include: none.     Nutrition:   Current diet is Low Cholesterol and Diabetic.      Medications:   Patient is not currently taking any over-the-counter supplements. Patient is able to manage medications.     Activities of Daily Living (ADLs)/Instrumental Activities of Daily Living (IADLs):   Walk and transfer into and out of bed and chair?: Yes  Dress and groom yourself?: Yes    Bathe or shower yourself?: Yes    Feed yourself? Yes  Do your laundry/housekeeping?: Yes  Manage your money, pay your bills and track your expenses?: Yes  Make your own meals?: Yes    Do your own shopping?: Yes    Previous Hospitalizations:   Any hospitalizations or ED visits within the last 12 months?: Yes    How many hospitalizations have you had in the last year?: 1-2    Advance Care Planning:   Living will: No    Durable POA for healthcare: No    Advanced directive: No    ACP document given: Yes      Cognitive Screening:   Provider or family/friend/caregiver concerned regarding cognition?: No    PREVENTIVE SCREENINGS      Cardiovascular Screening:    General: Screening Not Indicated and History Lipid Disorder      Diabetes Screening:     General: Screening Not Indicated and History Diabetes      Colorectal Cancer Screening:     General: Screening Current      Abdominal Aortic Aneurysm (AAA) Screening:    Risk factors include: age between 65-76 yo and tobacco use        Lung Cancer Screening:     General: Screening Not Indicated      Hepatitis C Screening:    General: Screening Current    Screening, Brief Intervention, and Referral to Treatment (SBIRT)    Screening  Typical number of drinks in a day: 0  Typical number of drinks in a week: 0  Interpretation: Low risk drinking behavior.    Single Item Drug Screening:  How often have you used an illegal drug (including marijuana) or a prescription medication for non-medical reasons in the past year? never    Single Item Drug Screen Score: 0  Interpretation: Negative screen for possible drug use disorder    Social Determinants of Health     Financial Resource Strain: Low  Risk  (5/23/2023)    Overall Financial Resource Strain (CARDIA)     Difficulty of Paying Living Expenses: Not hard at all   Food Insecurity: No Food Insecurity (5/28/2024)    Hunger Vital Sign     Worried About Running Out of Food in the Last Year: Never true     Ran Out of Food in the Last Year: Never true   Transportation Needs: No Transportation Needs (5/28/2024)    PRAPARE - Transportation     Lack of Transportation (Medical): No     Lack of Transportation (Non-Medical): No   Housing Stability: Low Risk  (5/28/2024)    Housing Stability Vital Sign     Unable to Pay for Housing in the Last Year: No     Number of Times Moved in the Last Year: 1     Homeless in the Last Year: No   Utilities: Not At Risk (5/28/2024)    [facility] Utilities     Threatened with loss of utilities: No     No results found.    Objective     /82 (BP Location: Left arm, Patient Position: Sitting, Cuff Size: Standard)   Pulse 84   Temp 98.6 °F (37 °C) (Temporal)   Ht 6' (1.829 m)   Wt 103 kg (228 lb)   SpO2 94%   BMI 30.92 kg/m²     Physical Exam  Vitals and nursing note reviewed.   Constitutional:       General: He is not in acute distress.     Appearance: He is well-developed.   HENT:      Head: Normocephalic and atraumatic.      Right Ear: Tympanic membrane, ear canal and external ear normal.      Left Ear: Tympanic membrane, ear canal and external ear normal.      Nose: Nose normal.      Mouth/Throat:      Mouth: Mucous membranes are moist.      Pharynx: Oropharynx is clear.   Eyes:      Conjunctiva/sclera: Conjunctivae normal.   Cardiovascular:      Rate and Rhythm: Normal rate and regular rhythm.      Heart sounds: Normal heart sounds. No murmur heard.  Pulmonary:      Effort: Pulmonary effort is normal. No respiratory distress.      Breath sounds: Normal breath sounds. No wheezing or rales.   Abdominal:      General: Abdomen is flat. There is no distension.      Palpations: Abdomen is soft.      Tenderness: There is no abdominal  tenderness.   Musculoskeletal:         General: No swelling.      Cervical back: Neck supple.      Right lower leg: No edema.      Left lower leg: No edema.   Skin:     General: Skin is warm and dry.      Capillary Refill: Capillary refill takes less than 2 seconds.   Neurological:      General: No focal deficit present.      Mental Status: He is alert and oriented to person, place, and time.   Psychiatric:         Mood and Affect: Mood normal.       Administrative Statements   I have spent a total time of 20 minutes on 05/28/24 In caring for this patient including Risks and benefits of tx options, Patient and family education, Impressions, Counseling / Coordination of care, Documenting in the medical record, and Reviewing / ordering tests, medicine, procedures  .

## 2024-05-29 ENCOUNTER — APPOINTMENT (OUTPATIENT)
Dept: PHYSICAL THERAPY | Facility: REHABILITATION | Age: 69
End: 2024-05-29
Payer: COMMERCIAL

## 2024-05-30 DIAGNOSIS — I10 ESSENTIAL HYPERTENSION, BENIGN: ICD-10-CM

## 2024-05-30 DIAGNOSIS — F41.9 ANXIETY: ICD-10-CM

## 2024-05-30 DIAGNOSIS — E78.5 HYPERLIPIDEMIA, UNSPECIFIED: ICD-10-CM

## 2024-05-30 DIAGNOSIS — M54.50 LOW BACK PAIN AT MULTIPLE SITES: ICD-10-CM

## 2024-05-30 DIAGNOSIS — E11.65 UNCONTROLLED TYPE 2 DIABETES MELLITUS WITH HYPERGLYCEMIA (HCC): ICD-10-CM

## 2024-05-31 ENCOUNTER — APPOINTMENT (OUTPATIENT)
Dept: PHYSICAL THERAPY | Facility: REHABILITATION | Age: 69
End: 2024-05-31
Payer: COMMERCIAL

## 2024-05-31 RX ORDER — ALPRAZOLAM 0.25 MG/1
0.25 TABLET ORAL DAILY PRN
Qty: 30 TABLET | Refills: 0 | Status: SHIPPED | OUTPATIENT
Start: 2024-05-31

## 2024-05-31 RX ORDER — ALPRAZOLAM 0.25 MG/1
0.25 TABLET ORAL DAILY PRN
Qty: 30 TABLET | Refills: 0 | Status: CANCELLED | OUTPATIENT
Start: 2024-05-31

## 2024-05-31 RX ORDER — LISINOPRIL 5 MG/1
5 TABLET ORAL DAILY
Qty: 90 TABLET | Refills: 0 | Status: SHIPPED | OUTPATIENT
Start: 2024-05-31

## 2024-05-31 RX ORDER — CYCLOBENZAPRINE HCL 5 MG
5 TABLET ORAL 3 TIMES DAILY PRN
Qty: 15 TABLET | Refills: 0 | Status: SHIPPED | OUTPATIENT
Start: 2024-05-31

## 2024-05-31 RX ORDER — IBUPROFEN 800 MG/1
800 TABLET ORAL EVERY 8 HOURS SCHEDULED
Qty: 30 TABLET | Refills: 0 | Status: SHIPPED | OUTPATIENT
Start: 2024-05-31

## 2024-05-31 RX ORDER — SIMVASTATIN 20 MG
20 TABLET ORAL
Qty: 90 TABLET | Refills: 0 | Status: SHIPPED | OUTPATIENT
Start: 2024-05-31

## 2024-06-11 DIAGNOSIS — E11.65 UNCONTROLLED TYPE 2 DIABETES MELLITUS WITH HYPERGLYCEMIA (HCC): ICD-10-CM

## 2024-06-27 PROBLEM — Z00.00 MEDICARE ANNUAL WELLNESS VISIT, SUBSEQUENT: Status: RESOLVED | Noted: 2021-01-12 | Resolved: 2024-06-27

## 2024-08-27 DIAGNOSIS — E11.65 UNCONTROLLED TYPE 2 DIABETES MELLITUS WITH HYPERGLYCEMIA (HCC): ICD-10-CM

## 2024-08-28 RX ORDER — DULAGLUTIDE 3 MG/.5ML
INJECTION, SOLUTION SUBCUTANEOUS
Qty: 6 ML | Refills: 1 | Status: SHIPPED | OUTPATIENT
Start: 2024-08-28

## 2024-10-24 ENCOUNTER — APPOINTMENT (OUTPATIENT)
Dept: LAB | Facility: CLINIC | Age: 69
End: 2024-10-24
Payer: COMMERCIAL

## 2024-10-24 ENCOUNTER — RA CDI HCC (OUTPATIENT)
Dept: OTHER | Facility: HOSPITAL | Age: 69
End: 2024-10-24

## 2024-10-24 DIAGNOSIS — I10 ESSENTIAL HYPERTENSION, BENIGN: ICD-10-CM

## 2024-10-24 DIAGNOSIS — E11.65 UNCONTROLLED TYPE 2 DIABETES MELLITUS WITH HYPERGLYCEMIA (HCC): ICD-10-CM

## 2024-10-24 DIAGNOSIS — E78.2 MIXED HYPERLIPIDEMIA: ICD-10-CM

## 2024-10-24 DIAGNOSIS — E53.8 B12 DEFICIENCY: ICD-10-CM

## 2024-10-24 LAB
ALBUMIN SERPL BCG-MCNC: 4.2 G/DL (ref 3.5–5)
ALP SERPL-CCNC: 40 U/L (ref 34–104)
ALT SERPL W P-5'-P-CCNC: 13 U/L (ref 7–52)
ANION GAP SERPL CALCULATED.3IONS-SCNC: 6 MMOL/L (ref 4–13)
AST SERPL W P-5'-P-CCNC: 15 U/L (ref 13–39)
BASOPHILS # BLD AUTO: 0.05 THOUSANDS/ΜL (ref 0–0.1)
BASOPHILS NFR BLD AUTO: 0 % (ref 0–1)
BILIRUB SERPL-MCNC: 0.65 MG/DL (ref 0.2–1)
BUN SERPL-MCNC: 18 MG/DL (ref 5–25)
CALCIUM SERPL-MCNC: 8.9 MG/DL (ref 8.4–10.2)
CHLORIDE SERPL-SCNC: 106 MMOL/L (ref 96–108)
CHOLEST SERPL-MCNC: 128 MG/DL
CO2 SERPL-SCNC: 26 MMOL/L (ref 21–32)
CREAT SERPL-MCNC: 0.87 MG/DL (ref 0.6–1.3)
CREAT UR-MCNC: 93.9 MG/DL
EOSINOPHIL # BLD AUTO: 0.42 THOUSAND/ΜL (ref 0–0.61)
EOSINOPHIL NFR BLD AUTO: 3 % (ref 0–6)
ERYTHROCYTE [DISTWIDTH] IN BLOOD BY AUTOMATED COUNT: 12.3 % (ref 11.6–15.1)
EST. AVERAGE GLUCOSE BLD GHB EST-MCNC: 123 MG/DL
GFR SERPL CREATININE-BSD FRML MDRD: 88 ML/MIN/1.73SQ M
GLUCOSE P FAST SERPL-MCNC: 111 MG/DL (ref 65–99)
HBA1C MFR BLD: 5.9 %
HCT VFR BLD AUTO: 52.4 % (ref 36.5–49.3)
HDLC SERPL-MCNC: 43 MG/DL
HGB BLD-MCNC: 16.5 G/DL (ref 12–17)
IMM GRANULOCYTES # BLD AUTO: 0.04 THOUSAND/UL (ref 0–0.2)
IMM GRANULOCYTES NFR BLD AUTO: 0 % (ref 0–2)
LDLC SERPL CALC-MCNC: 65 MG/DL (ref 0–100)
LYMPHOCYTES # BLD AUTO: 3.81 THOUSANDS/ΜL (ref 0.6–4.47)
LYMPHOCYTES NFR BLD AUTO: 31 % (ref 14–44)
MCH RBC QN AUTO: 31.6 PG (ref 26.8–34.3)
MCHC RBC AUTO-ENTMCNC: 31.5 G/DL (ref 31.4–37.4)
MCV RBC AUTO: 100 FL (ref 82–98)
MICROALBUMIN UR-MCNC: <7 MG/L
MONOCYTES # BLD AUTO: 1.05 THOUSAND/ΜL (ref 0.17–1.22)
MONOCYTES NFR BLD AUTO: 9 % (ref 4–12)
NEUTROPHILS # BLD AUTO: 6.84 THOUSANDS/ΜL (ref 1.85–7.62)
NEUTS SEG NFR BLD AUTO: 57 % (ref 43–75)
NRBC BLD AUTO-RTO: 0 /100 WBCS
PLATELET # BLD AUTO: 264 THOUSANDS/UL (ref 149–390)
PMV BLD AUTO: 9.9 FL (ref 8.9–12.7)
POTASSIUM SERPL-SCNC: 4.5 MMOL/L (ref 3.5–5.3)
PROT SERPL-MCNC: 6.7 G/DL (ref 6.4–8.4)
RBC # BLD AUTO: 5.22 MILLION/UL (ref 3.88–5.62)
SODIUM SERPL-SCNC: 138 MMOL/L (ref 135–147)
TRIGL SERPL-MCNC: 98 MG/DL
TSH SERPL DL<=0.05 MIU/L-ACNC: 1.91 UIU/ML (ref 0.45–4.5)
VIT B12 SERPL-MCNC: 481 PG/ML (ref 180–914)
WBC # BLD AUTO: 12.21 THOUSAND/UL (ref 4.31–10.16)

## 2024-10-24 PROCEDURE — 36415 COLL VENOUS BLD VENIPUNCTURE: CPT

## 2024-10-24 PROCEDURE — 82607 VITAMIN B-12: CPT

## 2024-10-24 PROCEDURE — 82570 ASSAY OF URINE CREATININE: CPT

## 2024-10-24 PROCEDURE — 83036 HEMOGLOBIN GLYCOSYLATED A1C: CPT

## 2024-10-24 PROCEDURE — 85025 COMPLETE CBC W/AUTO DIFF WBC: CPT

## 2024-10-24 PROCEDURE — 82043 UR ALBUMIN QUANTITATIVE: CPT

## 2024-10-24 PROCEDURE — 80053 COMPREHEN METABOLIC PANEL: CPT

## 2024-10-24 PROCEDURE — 84443 ASSAY THYROID STIM HORMONE: CPT

## 2024-10-24 PROCEDURE — 80061 LIPID PANEL: CPT

## 2024-10-30 DIAGNOSIS — I10 ESSENTIAL HYPERTENSION, BENIGN: ICD-10-CM

## 2024-10-30 DIAGNOSIS — E78.5 HYPERLIPIDEMIA, UNSPECIFIED: ICD-10-CM

## 2024-10-31 RX ORDER — SIMVASTATIN 20 MG
20 TABLET ORAL
Qty: 90 TABLET | Refills: 1 | Status: SHIPPED | OUTPATIENT
Start: 2024-10-31

## 2024-10-31 RX ORDER — LISINOPRIL 5 MG/1
5 TABLET ORAL DAILY
Qty: 90 TABLET | Refills: 1 | Status: SHIPPED | OUTPATIENT
Start: 2024-10-31

## 2024-11-01 ENCOUNTER — OFFICE VISIT (OUTPATIENT)
Dept: INTERNAL MEDICINE CLINIC | Facility: CLINIC | Age: 69
End: 2024-11-01
Payer: COMMERCIAL

## 2024-11-01 VITALS
TEMPERATURE: 98 F | BODY MASS INDEX: 31.83 KG/M2 | HEIGHT: 72 IN | WEIGHT: 235 LBS | DIASTOLIC BLOOD PRESSURE: 78 MMHG | OXYGEN SATURATION: 93 % | SYSTOLIC BLOOD PRESSURE: 132 MMHG | HEART RATE: 92 BPM

## 2024-11-01 DIAGNOSIS — E11.9 TYPE 2 DIABETES, HBA1C GOAL < 7% (HCC): ICD-10-CM

## 2024-11-01 DIAGNOSIS — I10 ESSENTIAL HYPERTENSION, BENIGN: Primary | ICD-10-CM

## 2024-11-01 DIAGNOSIS — F34.1 DYSTHYMIC DISORDER: ICD-10-CM

## 2024-11-01 DIAGNOSIS — Z23 NEEDS FLU SHOT: ICD-10-CM

## 2024-11-01 DIAGNOSIS — E78.2 MIXED HYPERLIPIDEMIA: ICD-10-CM

## 2024-11-01 DIAGNOSIS — E53.8 B12 DEFICIENCY: ICD-10-CM

## 2024-11-01 PROCEDURE — G0008 ADMIN INFLUENZA VIRUS VAC: HCPCS | Performed by: INTERNAL MEDICINE

## 2024-11-01 PROCEDURE — 90662 IIV NO PRSV INCREASED AG IM: CPT | Performed by: INTERNAL MEDICINE

## 2024-11-01 PROCEDURE — 99214 OFFICE O/P EST MOD 30 MIN: CPT | Performed by: INTERNAL MEDICINE

## 2024-11-01 NOTE — PROGRESS NOTES
Assessment/Plan:      Depression Screening and Follow-up Plan: Patient was screened for depression during today's encounter. They screened negative with a PHQ-9 score of 0.            1. Essential hypertension, benign  Comments:  Continues same medication  Orders:  -     CBC and differential; Future  -     Comprehensive metabolic panel; Future  -     Lipid Panel with Direct LDL reflex; Future  -     TSH, 3rd generation; Future  2. Type 2 diabetes, HbA1c goal < 7% ([facility])  Comments:  Continues same medication  Orders:  -     CBC and differential; Future  -     Comprehensive metabolic panel; Future  -     Lipid Panel with Direct LDL reflex; Future  -     TSH, 3rd generation; Future  -     Hemoglobin A1C; Future  -     Albumin / creatinine urine ratio; Future  3. Dysthymic disorder  4. Mixed hyperlipidemia  Comments:  Continues same medication  Orders:  -     Comprehensive metabolic panel; Future  -     Lipid Panel with Direct LDL reflex; Future  -     TSH, 3rd generation; Future  5. Needs flu shot  -     influenza vaccine, high-dose, PF 0.5 mL (Fluzone High Dose)  6. Body mass index 31.0-31.9, adult  7. B12 deficiency  -     Vitamin B12; Future         Subjective:      Patient ID: Dominik Reed is a 69 y.o. male.    Follow-up on blood and urine test done on 10/24/2024 test discussed with him    Diabetes  Pertinent negatives for hypoglycemia include no headaches or nervousness/anxiousness. Pertinent negatives for diabetes include no chest pain, no polyuria and no weakness.       The following portions of the patient's history were reviewed and updated as appropriate: He  has a past medical history of Acquired hypothyroidism, Anxiety, Anxiety disorder, Cigarette smoker, Colon cancer screening declined, Diabetes mellitus (HCC), Diabetes mellitus, type 2 (HCC), Dyslipidemia, Essential hypertension, benign, Hyperlipidemia, Hypertension, Kidney stone, Obesity, morbid (HCC) (01/12/2021), and Type II diabetes mellitus,  uncontrolled.  He   Patient Active Problem List    Diagnosis Date Noted    Low back pain at multiple sites 05/06/2024    DDD (degenerative disc disease), lumbar 05/06/2024    Acute respiratory failure with hypoxia ([facility]) 02/02/2024    Chronic obstructive pulmonary disease, unspecified COPD type ([facility]) 02/02/2024    Type 2 diabetes, HbA1c goal < 7% ([facility]) 02/01/2024    Snoring 07/13/2023    Hypoxia 07/10/2023    Acute appendicitis 07/08/2023    Clinical sepsis ([facility]) 07/08/2023    Depression, recurrent ([facility]) 01/24/2023    Aspirin long-term use 06/27/2022    GI bleed 06/26/2022    Hypotension 06/26/2022    Melena 06/26/2022    Needs flu shot 09/17/2021    Body mass index 34.0-34.9, adult 01/12/2021    Dyslipidemia     Diabetes mellitus, type 2 ([facility])     Colon cancer screening declined     Dysthymic disorder 09/16/2020    Body mass index 36.0-36.9, adult 09/16/2020    Hypertension     Mixed hyperlipidemia     Essential hypertension, benign     Uncontrolled type 2 diabetes mellitus with hyperglycemia ([facility])     Anxiety disorder      He  has a past surgical history that includes Kidney stone surgery; Cardiac catheterization; and APPENDECTOMY LAPAROSCOPIC (N/A, 7/8/2023).  His family history includes Heart attack in his father; Heart disease in his father.  He  reports that he has been smoking cigarettes. He started smoking about 45 years ago. He has a 19.9 pack-year smoking history. He has never been exposed to tobacco smoke. He has never used smokeless tobacco. He reports that he does not currently use alcohol after a past usage of about 1.0 standard drink of alcohol per week. He reports current drug use. Drug: Marijuana.  Current Outpatient Medications   Medication Sig Dispense Refill    acetaminophen (TYLENOL) 500 mg tablet Take 1 tablet (500 mg total) by mouth every 6 (six) hours as needed for mild pain  0    albuterol (Proventil HFA) 90 mcg/act inhaler Inhale 2 puffs every 6 (six) hours as needed for wheezing 6.7 g 0     ALPRAZolam (XANAX) 0.25 mg tablet Take 1 tablet (0.25 mg total) by mouth daily as needed for anxiety 30 tablet 0    ALPRAZolam (XANAX) 0.25 mg tablet Take 1 tablet (0.25 mg total) by mouth daily as needed for anxiety 30 tablet 0    Blood Glucose Monitoring Suppl (ONE TOUCH ULTRA 2) w/Device KIT CONTOUR METER, USE 3 TIMES A DAY E11.65 1 kit 0    cyclobenzaprine (FLEXERIL) 5 mg tablet Take 1 tablet (5 mg total) by mouth 3 (three) times a day as needed for muscle spasms 15 tablet 0    dulaglutide (Trulicity) 3 MG/0.5ML injection INJECT 0.5 MILLILITER (3 MG TOTAL) UNDER THE SKIN EVERY 7 DAYS 6 mL 1    glucose blood (OneTouch Ultra) test strip Use as instructed 100 strip 0    ibuprofen (MOTRIN) 800 mg tablet Take 1 tablet (800 mg total) by mouth every 8 (eight) hours 30 tablet 0    Lancets MISC Use 3 (three) times a day Contour lancets E11.65 100 each 0    lisinopril (ZESTRIL) 5 mg tablet TAKE 1 TABLET (5 MG TOTAL) BY MOUTH DAILY 90 tablet 1    metFORMIN (GLUCOPHAGE) 1000 MG tablet Take 1 tablet (1,000 mg total) by mouth 2 (two) times a day with meals 180 tablet 0    patient supplied medication Multi-Betic vitamin        simvastatin (ZOCOR) 20 mg tablet TAKE 1 TABLET (20 MG TOTAL) BY MOUTH DAILY AT BEDTIME 90 tablet 1    umeclidinium-vilanterol (Anoro Ellipta) 62.5-25 mcg/actuation inhaler Inhale 1 puff daily 180 each 1     No current facility-administered medications for this visit.     Current Outpatient Medications on File Prior to Visit   Medication Sig    acetaminophen (TYLENOL) 500 mg tablet Take 1 tablet (500 mg total) by mouth every 6 (six) hours as needed for mild pain    albuterol (Proventil HFA) 90 mcg/act inhaler Inhale 2 puffs every 6 (six) hours as needed for wheezing    ALPRAZolam (XANAX) 0.25 mg tablet Take 1 tablet (0.25 mg total) by mouth daily as needed for anxiety    ALPRAZolam (XANAX) 0.25 mg tablet Take 1 tablet (0.25 mg total) by mouth daily as needed for anxiety    Blood Glucose Monitoring Suppl  (ONE TOUCH ULTRA 2) w/Device KIT CONTOUR METER, USE 3 TIMES A DAY E11.65    cyclobenzaprine (FLEXERIL) 5 mg tablet Take 1 tablet (5 mg total) by mouth 3 (three) times a day as needed for muscle spasms    dulaglutide (Trulicity) 3 MG/0.5ML injection INJECT 0.5 MILLILITER (3 MG TOTAL) UNDER THE SKIN EVERY 7 DAYS    glucose blood (OneTouch Ultra) test strip Use as instructed    ibuprofen (MOTRIN) 800 mg tablet Take 1 tablet (800 mg total) by mouth every 8 (eight) hours    Lancets MISC Use 3 (three) times a day Contour lancets E11.65    lisinopril (ZESTRIL) 5 mg tablet TAKE 1 TABLET (5 MG TOTAL) BY MOUTH DAILY    metFORMIN (GLUCOPHAGE) 1000 MG tablet Take 1 tablet (1,000 mg total) by mouth 2 (two) times a day with meals    patient supplied medication Multi-Betic vitamin      simvastatin (ZOCOR) 20 mg tablet TAKE 1 TABLET (20 MG TOTAL) BY MOUTH DAILY AT BEDTIME    umeclidinium-vilanterol (Anoro Ellipta) 62.5-25 mcg/actuation inhaler Inhale 1 puff daily     No current facility-administered medications on file prior to visit.     He has No Known Allergies..    Review of Systems   Constitutional:  Negative for chills and fever.   HENT:  Negative for congestion, ear pain and sore throat.    Eyes:  Negative for pain.   Respiratory:  Negative for cough and shortness of breath.    Cardiovascular:  Negative for chest pain and leg swelling.   Gastrointestinal:  Negative for abdominal pain, nausea and vomiting.   Endocrine: Negative for polyuria.   Genitourinary:  Negative for difficulty urinating, frequency and urgency.   Musculoskeletal:  Negative for arthralgias and back pain.   Skin:  Negative for rash.   Neurological:  Negative for weakness and headaches.   Psychiatric/Behavioral:  Negative for sleep disturbance. The patient is not nervous/anxious.          Objective:      /78 (BP Location: Left arm, Patient Position: Sitting, Cuff Size: Adult)   Pulse 92   Temp 98 °F (36.7 °C)   Ht 6' (1.829 m)   Wt 107 kg (235 lb)    SpO2 93%   BMI 31.87 kg/m²     Recent Results (from the past 1344 hour(s))   Albumin / creatinine urine ratio    Collection Time: 10/24/24  7:08 AM   Result Value Ref Range    Creatinine, Ur 93.9 Reference range not established. mg/dL    Albumin,U,Random <7.0 <20.0 mg/L    Albumin Creat Ratio     Lipid Panel with Direct LDL reflex    Collection Time: 10/24/24  7:08 AM   Result Value Ref Range    Cholesterol 128 See Comment mg/dL    Triglycerides 98 See Comment mg/dL    HDL, Direct 43 >=40 mg/dL    LDL Calculated 65 0 - 100 mg/dL   TSH, 3rd generation    Collection Time: 10/24/24  7:08 AM   Result Value Ref Range    TSH 3RD GENERATON 1.908 0.450 - 4.500 uIU/mL   CBC and differential    Collection Time: 10/24/24  7:08 AM   Result Value Ref Range    WBC 12.21 (H) 4.31 - 10.16 Thousand/uL    RBC 5.22 3.88 - 5.62 Million/uL    Hemoglobin 16.5 12.0 - 17.0 g/dL    Hematocrit 52.4 (H) 36.5 - 49.3 %     (H) 82 - 98 fL    MCH 31.6 26.8 - 34.3 pg    MCHC 31.5 31.4 - 37.4 g/dL    RDW 12.3 11.6 - 15.1 %    MPV 9.9 8.9 - 12.7 fL    Platelets 264 149 - 390 Thousands/uL    nRBC 0 /100 WBCs    Segmented % 57 43 - 75 %    Immature Grans % 0 0 - 2 %    Lymphocytes % 31 14 - 44 %    Monocytes % 9 4 - 12 %    Eosinophils Relative 3 0 - 6 %    Basophils Relative 0 0 - 1 %    Absolute Neutrophils 6.84 1.85 - 7.62 Thousands/µL    Absolute Immature Grans 0.04 0.00 - 0.20 Thousand/uL    Absolute Lymphocytes 3.81 0.60 - 4.47 Thousands/µL    Absolute Monocytes 1.05 0.17 - 1.22 Thousand/µL    Eosinophils Absolute 0.42 0.00 - 0.61 Thousand/µL    Basophils Absolute 0.05 0.00 - 0.10 Thousands/µL   Comprehensive metabolic panel    Collection Time: 10/24/24  7:08 AM   Result Value Ref Range    Sodium 138 135 - 147 mmol/L    Potassium 4.5 3.5 - 5.3 mmol/L    Chloride 106 96 - 108 mmol/L    CO2 26 21 - 32 mmol/L    ANION GAP 6 4 - 13 mmol/L    BUN 18 5 - 25 mg/dL    Creatinine 0.87 0.60 - 1.30 mg/dL    Glucose, Fasting 111 (H) 65 - 99 mg/dL     Calcium 8.9 8.4 - 10.2 mg/dL    AST 15 13 - 39 U/L    ALT 13 7 - 52 U/L    Alkaline Phosphatase 40 34 - 104 U/L    Total Protein 6.7 6.4 - 8.4 g/dL    Albumin 4.2 3.5 - 5.0 g/dL    Total Bilirubin 0.65 0.20 - 1.00 mg/dL    eGFR 88 ml/min/1.73sq m   Hemoglobin A1C    Collection Time: 10/24/24  7:08 AM   Result Value Ref Range    Hemoglobin A1C 5.9 (H) Normal 4.0-5.6%; PreDiabetic 5.7-6.4%; Diabetic >=6.5%; Glycemic control for adults with diabetes <7.0% %     mg/dl   Vitamin B12    Collection Time: 10/24/24  7:08 AM   Result Value Ref Range    Vitamin B-12 481 180 - 914 pg/mL        Physical Exam  Constitutional:       Appearance: Normal appearance.   HENT:      Head: Normocephalic.      Right Ear: Tympanic membrane, ear canal and external ear normal.      Left Ear: Tympanic membrane, ear canal and external ear normal.      Nose: Nose normal. No congestion.      Mouth/Throat:      Mouth: Mucous membranes are moist.      Pharynx: Oropharynx is clear. No oropharyngeal exudate or posterior oropharyngeal erythema.   Eyes:      Extraocular Movements: Extraocular movements intact.      Conjunctiva/sclera: Conjunctivae normal.   Cardiovascular:      Rate and Rhythm: Normal rate and regular rhythm.      Heart sounds: Normal heart sounds. No murmur heard.  Pulmonary:      Effort: Pulmonary effort is normal.      Breath sounds: Normal breath sounds. No wheezing or rales.   Abdominal:      General: Abdomen is flat. There is no distension.      Palpations: Abdomen is soft.      Tenderness: There is no abdominal tenderness.   Musculoskeletal:         General: Normal range of motion.      Cervical back: Normal range of motion and neck supple.      Right lower leg: No edema.      Left lower leg: No edema.   Lymphadenopathy:      Cervical: No cervical adenopathy.   Skin:     General: Skin is warm.   Neurological:      General: No focal deficit present.      Mental Status: He is alert and oriented to person, place, and  time.

## 2024-11-15 DIAGNOSIS — E11.65 UNCONTROLLED TYPE 2 DIABETES MELLITUS WITH HYPERGLYCEMIA (HCC): ICD-10-CM

## 2024-11-15 DIAGNOSIS — F41.9 ANXIETY: ICD-10-CM

## 2024-11-15 RX ORDER — ALPRAZOLAM 0.25 MG/1
0.25 TABLET ORAL DAILY PRN
Qty: 30 TABLET | Refills: 0 | Status: SHIPPED | OUTPATIENT
Start: 2024-11-15

## 2024-11-29 DIAGNOSIS — E11.65 UNCONTROLLED TYPE 2 DIABETES MELLITUS WITH HYPERGLYCEMIA (HCC): ICD-10-CM

## 2024-11-29 DIAGNOSIS — J44.9 CHRONIC OBSTRUCTIVE PULMONARY DISEASE, UNSPECIFIED COPD TYPE (HCC): ICD-10-CM

## 2024-11-29 RX ORDER — UMECLIDINIUM BROMIDE AND VILANTEROL TRIFENATATE 62.5; 25 UG/1; UG/1
1 POWDER RESPIRATORY (INHALATION) DAILY
Qty: 180 EACH | Refills: 0 | Status: SHIPPED | OUTPATIENT
Start: 2024-11-29 | End: 2024-12-29

## 2024-11-29 NOTE — TELEPHONE ENCOUNTER
Medication: Trulicity    Dose/Frequency: 3 mg/0.5 ml - inject 0.5 ml (3 mg total) under the skin every 7 days     Quantity: 6 ml/84 days    Pharmacy: [facility]     Office:   [x] PCP/Provider -   [] Speciality/Provider -     Does the patient have enough for 3 days?   [x] Yes   [] No - Send as HP to POD

## 2025-02-22 DIAGNOSIS — J44.9 CHRONIC OBSTRUCTIVE PULMONARY DISEASE, UNSPECIFIED COPD TYPE (HCC): ICD-10-CM

## 2025-02-22 RX ORDER — UMECLIDINIUM BROMIDE AND VILANTEROL TRIFENATATE 62.5; 25 UG/1; UG/1
1 POWDER RESPIRATORY (INHALATION) DAILY
Qty: 180 EACH | Refills: 0 | Status: SHIPPED | OUTPATIENT
Start: 2025-02-22 | End: 2025-03-24

## 2025-03-14 ENCOUNTER — APPOINTMENT (OUTPATIENT)
Dept: LAB | Facility: CLINIC | Age: 70
End: 2025-03-14
Payer: COMMERCIAL

## 2025-03-14 DIAGNOSIS — E11.9 TYPE 2 DIABETES, HBA1C GOAL < 7% (HCC): ICD-10-CM

## 2025-03-14 DIAGNOSIS — E78.2 MIXED HYPERLIPIDEMIA: ICD-10-CM

## 2025-03-14 DIAGNOSIS — I10 ESSENTIAL HYPERTENSION, BENIGN: ICD-10-CM

## 2025-03-14 DIAGNOSIS — E53.8 B12 DEFICIENCY: ICD-10-CM

## 2025-03-14 LAB
ALBUMIN SERPL BCG-MCNC: 4.4 G/DL (ref 3.5–5)
ALP SERPL-CCNC: 42 U/L (ref 34–104)
ALT SERPL W P-5'-P-CCNC: 19 U/L (ref 7–52)
ANION GAP SERPL CALCULATED.3IONS-SCNC: 10 MMOL/L (ref 4–13)
AST SERPL W P-5'-P-CCNC: 19 U/L (ref 13–39)
BASOPHILS # BLD AUTO: 0.06 THOUSANDS/ÂΜL (ref 0–0.1)
BASOPHILS NFR BLD AUTO: 0 % (ref 0–1)
BILIRUB SERPL-MCNC: 0.9 MG/DL (ref 0.2–1)
BUN SERPL-MCNC: 23 MG/DL (ref 5–25)
CALCIUM SERPL-MCNC: 9 MG/DL (ref 8.4–10.2)
CHLORIDE SERPL-SCNC: 102 MMOL/L (ref 96–108)
CHOLEST SERPL-MCNC: 127 MG/DL (ref ?–200)
CO2 SERPL-SCNC: 26 MMOL/L (ref 21–32)
CREAT SERPL-MCNC: 0.87 MG/DL (ref 0.6–1.3)
CREAT UR-MCNC: 245.7 MG/DL
EOSINOPHIL # BLD AUTO: 0.48 THOUSAND/ÂΜL (ref 0–0.61)
EOSINOPHIL NFR BLD AUTO: 4 % (ref 0–6)
ERYTHROCYTE [DISTWIDTH] IN BLOOD BY AUTOMATED COUNT: 13.1 % (ref 11.6–15.1)
EST. AVERAGE GLUCOSE BLD GHB EST-MCNC: 123 MG/DL
GFR SERPL CREATININE-BSD FRML MDRD: 88 ML/MIN/1.73SQ M
GLUCOSE P FAST SERPL-MCNC: 122 MG/DL (ref 65–99)
HBA1C MFR BLD: 5.9 %
HCT VFR BLD AUTO: 48.3 % (ref 36.5–49.3)
HDLC SERPL-MCNC: 47 MG/DL
HGB BLD-MCNC: 15.9 G/DL (ref 12–17)
IMM GRANULOCYTES # BLD AUTO: 0.06 THOUSAND/UL (ref 0–0.2)
IMM GRANULOCYTES NFR BLD AUTO: 0 % (ref 0–2)
LDLC SERPL CALC-MCNC: 65 MG/DL (ref 0–100)
LYMPHOCYTES # BLD AUTO: 3.69 THOUSANDS/ÂΜL (ref 0.6–4.47)
LYMPHOCYTES NFR BLD AUTO: 28 % (ref 14–44)
MCH RBC QN AUTO: 31.9 PG (ref 26.8–34.3)
MCHC RBC AUTO-ENTMCNC: 32.9 G/DL (ref 31.4–37.4)
MCV RBC AUTO: 97 FL (ref 82–98)
MICROALBUMIN UR-MCNC: 33.2 MG/L
MICROALBUMIN/CREAT 24H UR: 14 MG/G CREATININE (ref 0–30)
MONOCYTES # BLD AUTO: 1.49 THOUSAND/ÂΜL (ref 0.17–1.22)
MONOCYTES NFR BLD AUTO: 11 % (ref 4–12)
NEUTROPHILS # BLD AUTO: 7.59 THOUSANDS/ÂΜL (ref 1.85–7.62)
NEUTS SEG NFR BLD AUTO: 57 % (ref 43–75)
NRBC BLD AUTO-RTO: 0 /100 WBCS
PLATELET # BLD AUTO: 231 THOUSANDS/UL (ref 149–390)
PMV BLD AUTO: 9.6 FL (ref 8.9–12.7)
POTASSIUM SERPL-SCNC: 3.9 MMOL/L (ref 3.5–5.3)
PROT SERPL-MCNC: 6.6 G/DL (ref 6.4–8.4)
RBC # BLD AUTO: 4.99 MILLION/UL (ref 3.88–5.62)
SODIUM SERPL-SCNC: 138 MMOL/L (ref 135–147)
TRIGL SERPL-MCNC: 76 MG/DL (ref ?–150)
TSH SERPL DL<=0.05 MIU/L-ACNC: 1.9 UIU/ML (ref 0.45–4.5)
VIT B12 SERPL-MCNC: 1495 PG/ML (ref 180–914)
WBC # BLD AUTO: 13.37 THOUSAND/UL (ref 4.31–10.16)

## 2025-03-14 PROCEDURE — 85025 COMPLETE CBC W/AUTO DIFF WBC: CPT

## 2025-03-14 PROCEDURE — 80061 LIPID PANEL: CPT

## 2025-03-14 PROCEDURE — 82570 ASSAY OF URINE CREATININE: CPT

## 2025-03-14 PROCEDURE — 84443 ASSAY THYROID STIM HORMONE: CPT

## 2025-03-14 PROCEDURE — 82607 VITAMIN B-12: CPT

## 2025-03-14 PROCEDURE — 83036 HEMOGLOBIN GLYCOSYLATED A1C: CPT

## 2025-03-14 PROCEDURE — 36415 COLL VENOUS BLD VENIPUNCTURE: CPT

## 2025-03-14 PROCEDURE — 82043 UR ALBUMIN QUANTITATIVE: CPT

## 2025-03-14 PROCEDURE — 80053 COMPREHEN METABOLIC PANEL: CPT

## 2025-03-17 ENCOUNTER — RA CDI HCC (OUTPATIENT)
Dept: OTHER | Facility: HOSPITAL | Age: 70
End: 2025-03-17

## 2025-03-17 NOTE — PROGRESS NOTES
HCC coding opportunities    E11.36  GR     Chart Reviewed number of suggestions sent to Provider: 1     Patients Insurance     Medicare Insurance: [employer] Advantage

## 2025-03-21 ENCOUNTER — OFFICE VISIT (OUTPATIENT)
Dept: INTERNAL MEDICINE CLINIC | Facility: CLINIC | Age: 70
End: 2025-03-21
Payer: COMMERCIAL

## 2025-03-21 VITALS
HEART RATE: 91 BPM | WEIGHT: 250 LBS | BODY MASS INDEX: 33.86 KG/M2 | HEIGHT: 72 IN | OXYGEN SATURATION: 98 % | TEMPERATURE: 98.7 F | DIASTOLIC BLOOD PRESSURE: 82 MMHG | SYSTOLIC BLOOD PRESSURE: 142 MMHG

## 2025-03-21 DIAGNOSIS — I10 ESSENTIAL HYPERTENSION, BENIGN: Primary | ICD-10-CM

## 2025-03-21 DIAGNOSIS — M51.360 DEGENERATION OF INTERVERTEBRAL DISC OF LUMBAR REGION WITH DISCOGENIC BACK PAIN: ICD-10-CM

## 2025-03-21 DIAGNOSIS — E78.2 MIXED HYPERLIPIDEMIA: ICD-10-CM

## 2025-03-21 DIAGNOSIS — E11.65 UNCONTROLLED TYPE 2 DIABETES MELLITUS WITH HYPERGLYCEMIA (HCC): ICD-10-CM

## 2025-03-21 DIAGNOSIS — J44.9 CHRONIC OBSTRUCTIVE PULMONARY DISEASE, UNSPECIFIED COPD TYPE (HCC): ICD-10-CM

## 2025-03-21 PROCEDURE — G2211 COMPLEX E/M VISIT ADD ON: HCPCS | Performed by: INTERNAL MEDICINE

## 2025-03-21 PROCEDURE — 99214 OFFICE O/P EST MOD 30 MIN: CPT | Performed by: INTERNAL MEDICINE

## 2025-03-21 NOTE — PROGRESS NOTES
Assessment/Plan:             1. Essential hypertension, benign  Comments:  continue same med  2. Uncontrolled type 2 diabetes mellitus with hyperglycemia ([facility])  Comments:  continue same med  3. Degeneration of intervertebral disc of lumbar region with discogenic back pain  4. Mixed hyperlipidemia  Comments:  continue same med  5. Chronic obstructive pulmonary disease, unspecified COPD type ([facility])  Comments:  continue same med         Subjective:      Patient ID: Dominik Reed is a 69 y.o. male.    Follow-up on blood and urine test done on 3/14/2025 test discussed with him        The following portions of the patient's history were reviewed and updated as appropriate: He  has a past medical history of Acquired hypothyroidism, Anxiety, Anxiety disorder, Cigarette smoker, Colon cancer screening declined, COPD (chronic obstructive pulmonary disease) ([facility]), Diabetes mellitus ([facility]), Diabetes mellitus, type 2 ([facility]), Diverticulitis of colon (2022), Dyslipidemia, Essential hypertension, benign, GI (gastrointestinal bleed) (2022), Hyperlipidemia, Hypertension, Kidney stone, Obesity, Obesity, morbid ([facility]) (01/12/2021), and Type II diabetes mellitus, uncontrolled.  He   Patient Active Problem List    Diagnosis Date Noted   • Low back pain at multiple sites 05/06/2024   • DDD (degenerative disc disease), lumbar 05/06/2024   • Acute respiratory failure with hypoxia ([facility]) 02/02/2024   • Chronic obstructive pulmonary disease, unspecified COPD type ([facility]) 02/02/2024   • Type 2 diabetes, HbA1c goal < 7% ([facility]) 02/01/2024   • Snoring 07/13/2023   • Hypoxia 07/10/2023   • Acute appendicitis 07/08/2023   • Clinical sepsis ([facility]) 07/08/2023   • Depression, recurrent ([facility]) 01/24/2023   • Aspirin long-term use 06/27/2022   • GI bleed 06/26/2022   • Hypotension 06/26/2022   • Melena 06/26/2022   • Needs flu shot 09/17/2021   • Body mass index 34.0-34.9, adult 01/12/2021   • Dyslipidemia    • Diabetes mellitus, type 2 ([facility])    • Colon cancer  screening declined    • Dysthymic disorder 09/16/2020   • Body mass index 36.0-36.9, adult 09/16/2020   • Hypertension    • Mixed hyperlipidemia    • Essential hypertension, benign    • Uncontrolled type 2 diabetes mellitus with hyperglycemia (HCC)    • Anxiety disorder      He  has a past surgical history that includes Kidney stone surgery; Cardiac catheterization; APPENDECTOMY LAPAROSCOPIC (N/A, 07/08/2023); Colonoscopy (6/2022); and Appendectomy (7/8/23).  His family history includes Heart attack in his father; Heart disease in his father.  He  reports that he quit smoking about 6 years ago. His smoking use included cigarettes. He started smoking about 46 years ago. He has a 19.9 pack-year smoking history. He has never been exposed to tobacco smoke. He has never used smokeless tobacco. He reports that he does not currently use alcohol after a past usage of about 1.0 standard drink of alcohol per week. He reports current drug use. Drug: Marijuana.  Current Outpatient Medications   Medication Sig Dispense Refill   • acetaminophen (TYLENOL) 500 mg tablet Take 1 tablet (500 mg total) by mouth every 6 (six) hours as needed for mild pain  0   • albuterol (Proventil HFA) 90 mcg/act inhaler Inhale 2 puffs every 6 (six) hours as needed for wheezing 6.7 g 0   • ALPRAZolam (XANAX) 0.25 mg tablet Take 1 tablet (0.25 mg total) by mouth daily as needed for anxiety 30 tablet 0   • ALPRAZolam (XANAX) 0.25 mg tablet Take 1 tablet (0.25 mg total) by mouth daily as needed for anxiety 30 tablet 0   • Anoro Ellipta 62.5-25 MCG/ACT inhaler INHALE 1 PUFF DAILY 180 each 0   • Blood Glucose Monitoring Suppl (ONE TOUCH ULTRA 2) w/Device KIT CONTOUR METER, USE 3 TIMES A DAY E11.65 1 kit 0   • cyclobenzaprine (FLEXERIL) 5 mg tablet Take 1 tablet (5 mg total) by mouth 3 (three) times a day as needed for muscle spasms 15 tablet 0   • Dulaglutide 3 MG/0.5ML SOAJ Inject 0.5 mL under the skin every 7 days 6 mL 1   • glucose blood (OneTouch  Ultra) test strip Use as instructed 100 strip 0   • ibuprofen (MOTRIN) 800 mg tablet Take 1 tablet (800 mg total) by mouth every 8 (eight) hours 30 tablet 0   • Lancets MISC Use 3 (three) times a day Contour lancets E11.65 100 each 0   • lisinopril (ZESTRIL) 5 mg tablet TAKE 1 TABLET (5 MG TOTAL) BY MOUTH DAILY 90 tablet 1   • metFORMIN (GLUCOPHAGE) 1000 MG tablet Take 1 tablet (1,000 mg total) by mouth 2 (two) times a day with meals 180 tablet 1   • patient supplied medication Multi-Betic vitamin       • simvastatin (ZOCOR) 20 mg tablet TAKE 1 TABLET (20 MG TOTAL) BY MOUTH DAILY AT BEDTIME 90 tablet 1     No current facility-administered medications for this visit.     Current Outpatient Medications on File Prior to Visit   Medication Sig   • acetaminophen (TYLENOL) 500 mg tablet Take 1 tablet (500 mg total) by mouth every 6 (six) hours as needed for mild pain   • albuterol (Proventil HFA) 90 mcg/act inhaler Inhale 2 puffs every 6 (six) hours as needed for wheezing   • ALPRAZolam (XANAX) 0.25 mg tablet Take 1 tablet (0.25 mg total) by mouth daily as needed for anxiety   • ALPRAZolam (XANAX) 0.25 mg tablet Take 1 tablet (0.25 mg total) by mouth daily as needed for anxiety   • Anoro Ellipta 62.5-25 MCG/ACT inhaler INHALE 1 PUFF DAILY   • Blood Glucose Monitoring Suppl (ONE TOUCH ULTRA 2) w/Device KIT CONTOUR METER, USE 3 TIMES A DAY E11.65   • cyclobenzaprine (FLEXERIL) 5 mg tablet Take 1 tablet (5 mg total) by mouth 3 (three) times a day as needed for muscle spasms   • Dulaglutide 3 MG/0.5ML SOAJ Inject 0.5 mL under the skin every 7 days   • glucose blood (OneTouch Ultra) test strip Use as instructed   • ibuprofen (MOTRIN) 800 mg tablet Take 1 tablet (800 mg total) by mouth every 8 (eight) hours   • Lancets MISC Use 3 (three) times a day Contour lancets E11.65   • lisinopril (ZESTRIL) 5 mg tablet TAKE 1 TABLET (5 MG TOTAL) BY MOUTH DAILY   • metFORMIN (GLUCOPHAGE) 1000 MG tablet Take 1 tablet (1,000 mg total) by  mouth 2 (two) times a day with meals   • patient supplied medication Multi-Betic vitamin     • simvastatin (ZOCOR) 20 mg tablet TAKE 1 TABLET (20 MG TOTAL) BY MOUTH DAILY AT BEDTIME     No current facility-administered medications on file prior to visit.     He has no known allergies..    Review of Systems   Constitutional:  Negative for chills and fever.   HENT:  Negative for congestion, ear pain and sore throat.    Eyes:  Negative for pain.   Respiratory:  Negative for cough and shortness of breath.    Cardiovascular:  Negative for chest pain and leg swelling.   Gastrointestinal:  Negative for abdominal pain, nausea and vomiting.   Endocrine: Negative for polyuria.   Genitourinary:  Negative for difficulty urinating, frequency and urgency.   Musculoskeletal:  Negative for arthralgias and back pain.   Skin:  Negative for rash.   Neurological:  Negative for weakness and headaches.   Psychiatric/Behavioral:  Negative for sleep disturbance. The patient is not nervous/anxious.          Objective:      /82 (BP Location: Left arm, Patient Position: Sitting, Cuff Size: Standard)   Pulse 91   Temp 98.7 °F (37.1 °C) (Temporal)   Ht 6' (1.829 m)   Wt 113 kg (250 lb)   SpO2 98%   BMI 33.91 kg/m²     Recent Results (from the past 8 weeks)   CBC and differential    Collection Time: 03/14/25  6:34 AM   Result Value Ref Range    WBC 13.37 (H) 4.31 - 10.16 Thousand/uL    RBC 4.99 3.88 - 5.62 Million/uL    Hemoglobin 15.9 12.0 - 17.0 g/dL    Hematocrit 48.3 36.5 - 49.3 %    MCV 97 82 - 98 fL    MCH 31.9 26.8 - 34.3 pg    MCHC 32.9 31.4 - 37.4 g/dL    RDW 13.1 11.6 - 15.1 %    MPV 9.6 8.9 - 12.7 fL    Platelets 231 149 - 390 Thousands/uL    nRBC 0 /100 WBCs    Segmented % 57 43 - 75 %    Immature Grans % 0 0 - 2 %    Lymphocytes % 28 14 - 44 %    Monocytes % 11 4 - 12 %    Eosinophils Relative 4 0 - 6 %    Basophils Relative 0 0 - 1 %    Absolute Neutrophils 7.59 1.85 - 7.62 Thousands/µL    Absolute Immature Grans 0.06  0.00 - 0.20 Thousand/uL    Absolute Lymphocytes 3.69 0.60 - 4.47 Thousands/µL    Absolute Monocytes 1.49 (H) 0.17 - 1.22 Thousand/µL    Eosinophils Absolute 0.48 0.00 - 0.61 Thousand/µL    Basophils Absolute 0.06 0.00 - 0.10 Thousands/µL   Comprehensive metabolic panel    Collection Time: 03/14/25  6:34 AM   Result Value Ref Range    Sodium 138 135 - 147 mmol/L    Potassium 3.9 3.5 - 5.3 mmol/L    Chloride 102 96 - 108 mmol/L    CO2 26 21 - 32 mmol/L    ANION GAP 10 4 - 13 mmol/L    BUN 23 5 - 25 mg/dL    Creatinine 0.87 0.60 - 1.30 mg/dL    Glucose, Fasting 122 (H) 65 - 99 mg/dL    Calcium 9.0 8.4 - 10.2 mg/dL    AST 19 13 - 39 U/L    ALT 19 7 - 52 U/L    Alkaline Phosphatase 42 34 - 104 U/L    Total Protein 6.6 6.4 - 8.4 g/dL    Albumin 4.4 3.5 - 5.0 g/dL    Total Bilirubin 0.90 0.20 - 1.00 mg/dL    eGFR 88 ml/min/1.73sq m   Lipid Panel with Direct LDL reflex    Collection Time: 03/14/25  6:34 AM   Result Value Ref Range    Cholesterol 127 See Comment mg/dL    Triglycerides 76 See Comment mg/dL    HDL, Direct 47 >=40 mg/dL    LDL Calculated 65 0 - 100 mg/dL   TSH, 3rd generation    Collection Time: 03/14/25  6:34 AM   Result Value Ref Range    TSH 3RD GENERATON 1.899 0.450 - 4.500 uIU/mL   Vitamin B12    Collection Time: 03/14/25  6:34 AM   Result Value Ref Range    Vitamin B-12 1,495 (H) 180 - 914 pg/mL   Hemoglobin A1C    Collection Time: 03/14/25  6:34 AM   Result Value Ref Range    Hemoglobin A1C 5.9 (H) Normal 4.0-5.6%; PreDiabetic 5.7-6.4%; Diabetic >=6.5%; Glycemic control for adults with diabetes <7.0% %     mg/dl   Albumin / creatinine urine ratio    Collection Time: 03/14/25  6:34 AM   Result Value Ref Range    Creatinine, Ur 245.7 Reference range not established. mg/dL    Albumin,U,Random 33.2 (H) <20.0 mg/L    Albumin Creat Ratio 14 0 - 30 mg/g creatinine        Physical Exam  Constitutional:       Appearance: Normal appearance.   HENT:      Head: Normocephalic.      Right Ear: Tympanic  membrane, ear canal and external ear normal.      Left Ear: Tympanic membrane, ear canal and external ear normal.      Nose: Nose normal. No congestion.      Mouth/Throat:      Mouth: Mucous membranes are moist.      Pharynx: Oropharynx is clear. No oropharyngeal exudate or posterior oropharyngeal erythema.   Eyes:      Extraocular Movements: Extraocular movements intact.      Conjunctiva/sclera: Conjunctivae normal.      Pupils: Pupils are equal, round, and reactive to light.   Cardiovascular:      Rate and Rhythm: Normal rate and regular rhythm.      Heart sounds: Normal heart sounds. No murmur heard.  Pulmonary:      Effort: Pulmonary effort is normal.      Breath sounds: Normal breath sounds. No wheezing or rales.   Abdominal:      General: Abdomen is flat. There is no distension.      Palpations: Abdomen is soft.      Tenderness: There is no abdominal tenderness.   Musculoskeletal:      Cervical back: Normal range of motion and neck supple.      Right lower leg: No edema.      Left lower leg: No edema.   Lymphadenopathy:      Cervical: No cervical adenopathy.   Skin:     General: Skin is warm.   Neurological:      General: No focal deficit present.      Mental Status: He is alert and oriented to person, place, and time.

## 2025-05-09 ENCOUNTER — VBI (OUTPATIENT)
Dept: ADMINISTRATIVE | Facility: OTHER | Age: 70
End: 2025-05-09

## 2025-05-09 DIAGNOSIS — E11.65 UNCONTROLLED TYPE 2 DIABETES MELLITUS WITH HYPERGLYCEMIA (HCC): ICD-10-CM

## 2025-05-09 RX ORDER — DULAGLUTIDE 3 MG/.5ML
INJECTION, SOLUTION SUBCUTANEOUS
Qty: 6 ML | Refills: 1 | Status: SHIPPED | OUTPATIENT
Start: 2025-05-09

## 2025-05-09 NOTE — TELEPHONE ENCOUNTER
05/09/25 9:49 AM     Chart reviewed for Diabetic Eye Exam was/were not submitted to the patient's insurance.     Roula Germain MA   [facility]

## 2025-05-14 DIAGNOSIS — E11.65 UNCONTROLLED TYPE 2 DIABETES MELLITUS WITH HYPERGLYCEMIA (HCC): ICD-10-CM

## 2025-05-14 DIAGNOSIS — E78.5 HYPERLIPIDEMIA, UNSPECIFIED: ICD-10-CM

## 2025-05-14 DIAGNOSIS — F41.9 ANXIETY: ICD-10-CM

## 2025-05-14 DIAGNOSIS — I10 ESSENTIAL HYPERTENSION, BENIGN: ICD-10-CM

## 2025-05-15 RX ORDER — SIMVASTATIN 20 MG
20 TABLET ORAL
Qty: 90 TABLET | Refills: 1 | Status: SHIPPED | OUTPATIENT
Start: 2025-05-15

## 2025-05-15 RX ORDER — ALPRAZOLAM 0.25 MG
0.25 TABLET ORAL DAILY PRN
Qty: 30 TABLET | Refills: 0 | Status: SHIPPED | OUTPATIENT
Start: 2025-05-15

## 2025-05-15 RX ORDER — LISINOPRIL 5 MG/1
5 TABLET ORAL DAILY
Qty: 90 TABLET | Refills: 1 | Status: SHIPPED | OUTPATIENT
Start: 2025-05-15

## 2025-05-15 RX ORDER — DULAGLUTIDE 3 MG/.5ML
INJECTION, SOLUTION SUBCUTANEOUS
Qty: 6 ML | Refills: 0 | OUTPATIENT
Start: 2025-05-15

## 2025-05-22 DIAGNOSIS — J44.9 CHRONIC OBSTRUCTIVE PULMONARY DISEASE, UNSPECIFIED COPD TYPE (HCC): ICD-10-CM

## 2025-05-22 RX ORDER — UMECLIDINIUM BROMIDE AND VILANTEROL TRIFENATATE 62.5; 25 UG/1; UG/1
1 POWDER RESPIRATORY (INHALATION) DAILY
Qty: 180 EACH | Refills: 0 | Status: SHIPPED | OUTPATIENT
Start: 2025-05-22 | End: 2025-06-21

## 2025-05-30 ENCOUNTER — OFFICE VISIT (OUTPATIENT)
Dept: INTERNAL MEDICINE CLINIC | Facility: CLINIC | Age: 70
End: 2025-05-30
Payer: COMMERCIAL

## 2025-05-30 VITALS
DIASTOLIC BLOOD PRESSURE: 86 MMHG | SYSTOLIC BLOOD PRESSURE: 140 MMHG | HEART RATE: 78 BPM | HEIGHT: 72 IN | OXYGEN SATURATION: 97 % | WEIGHT: 243.6 LBS | BODY MASS INDEX: 33 KG/M2 | TEMPERATURE: 98.6 F

## 2025-05-30 DIAGNOSIS — E78.2 MIXED HYPERLIPIDEMIA: ICD-10-CM

## 2025-05-30 DIAGNOSIS — E11.65 UNCONTROLLED TYPE 2 DIABETES MELLITUS WITH HYPERGLYCEMIA (HCC): ICD-10-CM

## 2025-05-30 DIAGNOSIS — I10 ESSENTIAL HYPERTENSION, BENIGN: ICD-10-CM

## 2025-05-30 DIAGNOSIS — Z00.00 MEDICARE ANNUAL WELLNESS VISIT, SUBSEQUENT: Primary | ICD-10-CM

## 2025-05-30 PROCEDURE — G0439 PPPS, SUBSEQ VISIT: HCPCS | Performed by: INTERNAL MEDICINE

## 2025-05-30 NOTE — PROGRESS NOTES
Name: Dominik Reed      : 1955      MRN: [id number]  Encounter Provider: Geoff Castro MD  Encounter Date: 2025   Encounter department: [facility] INTERNAL MEDICINE  :  Assessment & Plan  Medicare annual wellness visit, subsequent         Essential hypertension, benign    Orders:  •  CBC and differential; Future  •  Comprehensive metabolic panel; Future  •  Lipid Panel with Direct LDL reflex; Future  •  TSH, 3rd generation; Future    Uncontrolled type 2 diabetes mellitus with hyperglycemia (HCC)    Lab Results   Component Value Date    HGBA1C 5.9 (H) 2025       Orders:  •  Albumin / creatinine urine ratio; Future  •  CBC and differential; Future  •  Comprehensive metabolic panel; Future  •  Lipid Panel with Direct LDL reflex; Future  •  TSH, 3rd generation; Future  •  Hemoglobin A1C; Future    Mixed hyperlipidemia    Orders:  •  Comprehensive metabolic panel; Future  •  Lipid Panel with Direct LDL reflex; Future  •  TSH, 3rd generation; Future       Preventive health issues were discussed with patient, and age appropriate screening tests were ordered as noted in patient's After Visit Summary. Personalized health advice and appropriate referrals for health education or preventive services given if needed, as noted in patient's After Visit Summary.    History of Present Illness     AWV       Patient Care Team:  Geoff Castro MD as PCP - General (Internal Medicine)    Review of Systems   Constitutional:  Negative for chills and fever.   HENT:  Negative for congestion, ear pain and sore throat.    Eyes:  Negative for pain.   Respiratory:  Negative for cough and shortness of breath.    Cardiovascular:  Negative for chest pain and leg swelling.   Gastrointestinal:  Negative for abdominal pain, nausea and vomiting.   Endocrine: Negative for polyuria.   Genitourinary:  Negative for difficulty urinating, frequency and urgency.   Musculoskeletal:  Negative for arthralgias and back pain.   Skin:   Negative for rash.   Neurological:  Negative for weakness and headaches.   Psychiatric/Behavioral:  Negative for sleep disturbance. The patient is not nervous/anxious.      Medical History Reviewed by provider this encounter:  Tobacco  Allergies  Meds  Problems  Med Hx  Surg Hx  Fam Hx       Annual Wellness Visit Questionnaire   Dominik is here for his Subsequent Wellness visit. Last Medicare Wellness visit information reviewed, patient interviewed and updates made to the record today.      Health Risk Assessment:   Patient rates overall health as good. Patient feels that their physical health rating is same. Patient is satisfied with their life. Eyesight was rated as same. Hearing was rated as same. Patient feels that their emotional and mental health rating is same. Patients states they are never, rarely angry. Patient states they are never, rarely unusually tired/fatigued. Pain experienced in the last 7 days has been none. Patient states that he has experienced no weight loss or gain in last 6 months.     Depression Screening:   PHQ-9 Score: 0      Fall Risk Screening:   In the past year, patient has experienced: no history of falling in past year      Home Safety:  Patient does not have trouble with stairs inside or outside of their home. Patient has working smoke alarms and has working carbon monoxide detector. Home safety hazards include: none.     Nutrition:   Current diet is Diabetic.     Medications:   Patient is not currently taking any over-the-counter supplements. Patient is able to manage medications.     Activities of Daily Living (ADLs)/Instrumental Activities of Daily Living (IADLs):   Walk and transfer into and out of bed and chair?: Yes  Dress and groom yourself?: Yes    Bathe or shower yourself?: Yes    Feed yourself? Yes  Do your laundry/housekeeping?: Yes  Manage your money, pay your bills and track your expenses?: Yes  Make your own meals?: Yes    Do your own shopping?: Yes    Previous  Hospitalizations:   Any hospitalizations or ED visits within the last 12 months?: No      Advance Care Planning:   Living will: No    Durable POA for healthcare: Yes    Advanced directive: No    ACP document given: Yes      Cognitive Screening:   Provider or family/friend/caregiver concerned regarding cognition?: No    Preventive Screenings      Cardiovascular Screening:    General: Screening Not Indicated and History Lipid Disorder      Diabetes Screening:     General: Screening Not Indicated and History Diabetes      Colorectal Cancer Screening:     General: Screening Current      Abdominal Aortic Aneurysm (AAA) Screening:    Risk factors include: age between 65-76 yo and tobacco use        Lung Cancer Screening:     General: Screening Not Indicated      Hepatitis C Screening:    General: Screening Current    Immunizations:  - Immunizations due: Zoster (Shingrix)    Screening, Brief Intervention, and Referral to Treatment (SBIRT)     Screening  Typical number of drinks in a day: 0  Typical number of drinks in a week: 0  Interpretation: Low risk drinking behavior.    Single Item Drug Screening:  How often have you used an illegal drug (including marijuana) or a prescription medication for non-medical reasons in the past year? never    Single Item Drug Screen Score: 0  Interpretation: Negative screen for possible drug use disorder    Social Drivers of Health     Financial Resource Strain: Low Risk  (5/23/2023)    Overall Financial Resource Strain (CARDIA)    • Difficulty of Paying Living Expenses: Not hard at all   Food Insecurity: No Food Insecurity (5/30/2025)    Nursing - Inadequate Food Risk Classification    • Worried About Running Out of Food in the Last Year: Never true    • Ran Out of Food in the Last Year: Never true   Transportation Needs: No Transportation Needs (5/30/2025)    PRAPARE - Transportation    • Lack of Transportation (Medical): No    • Lack of Transportation (Non-Medical): No   Housing  Stability: Low Risk  (5/30/2025)    Housing Stability Vital Sign    • Unable to Pay for Housing in the Last Year: No    • Number of Times Moved in the Last Year: 0    • Homeless in the Last Year: No   Utilities: Not At Risk (5/30/2025)    [facility] Utilities    • Threatened with loss of utilities: No     No results found.    Objective   /86 (BP Location: Left arm, Patient Position: Sitting, Cuff Size: Standard)   Pulse 78   Temp 98.6 °F (37 °C) (Temporal)   Ht 6' (1.829 m)   Wt 110 kg (243 lb 9.6 oz)   SpO2 97%   BMI 33.04 kg/m²     Physical Exam  Vitals and nursing note reviewed.   Constitutional:       General: He is not in acute distress.     Appearance: He is well-developed.   HENT:      Head: Normocephalic and atraumatic.      Right Ear: Tympanic membrane, ear canal and external ear normal.      Left Ear: Tympanic membrane, ear canal and external ear normal.      Mouth/Throat:      Mouth: Mucous membranes are moist.      Pharynx: Oropharynx is clear.     Eyes:      Extraocular Movements: Extraocular movements intact.      Conjunctiva/sclera: Conjunctivae normal.       Cardiovascular:      Rate and Rhythm: Normal rate and regular rhythm.      Heart sounds: Normal heart sounds. No murmur heard.  Pulmonary:      Effort: Pulmonary effort is normal. No respiratory distress.      Breath sounds: Normal breath sounds. No wheezing or rales.   Abdominal:      General: Abdomen is flat. There is no distension.      Palpations: Abdomen is soft.      Tenderness: There is no abdominal tenderness.     Musculoskeletal:         General: No swelling.      Cervical back: Neck supple.      Right lower leg: No edema.      Left lower leg: No edema.     Skin:     General: Skin is warm and dry.      Capillary Refill: Capillary refill takes less than 2 seconds.     Neurological:      General: No focal deficit present.      Mental Status: He is alert and oriented to person, place, and time.     Psychiatric:         Mood and Affect:  Mood normal.

## 2025-05-30 NOTE — ASSESSMENT & PLAN NOTE
Lab Results   Component Value Date    HGBA1C 5.9 (H) 03/14/2025       Orders:  •  Albumin / creatinine urine ratio; Future  •  CBC and differential; Future  •  Comprehensive metabolic panel; Future  •  Lipid Panel with Direct LDL reflex; Future  •  TSH, 3rd generation; Future  •  Hemoglobin A1C; Future  
  Orders:  •  CBC and differential; Future  •  Comprehensive metabolic panel; Future  •  Lipid Panel with Direct LDL reflex; Future  •  TSH, 3rd generation; Future  
  Orders:  •  Comprehensive metabolic panel; Future  •  Lipid Panel with Direct LDL reflex; Future  •  TSH, 3rd generation; Future  
FAMILY HISTORY:  No pertinent family history in first degree relatives

## 2025-07-13 ENCOUNTER — PATIENT MESSAGE (OUTPATIENT)
Dept: INTERNAL MEDICINE CLINIC | Facility: CLINIC | Age: 70
End: 2025-07-13

## 2025-07-13 ENCOUNTER — HOSPITAL ENCOUNTER (EMERGENCY)
Facility: HOSPITAL | Age: 70
Discharge: HOME | End: 2025-07-13
Attending: EMERGENCY MEDICINE | Admitting: EMERGENCY MEDICINE
Payer: COMMERCIAL

## 2025-07-13 VITALS
OXYGEN SATURATION: 96 % | DIASTOLIC BLOOD PRESSURE: 86 MMHG | TEMPERATURE: 98.7 F | SYSTOLIC BLOOD PRESSURE: 164 MMHG | HEART RATE: 96 BPM | RESPIRATION RATE: 20 BRPM

## 2025-07-13 DIAGNOSIS — H57.89 IRRITATION OF LEFT EYE: Primary | ICD-10-CM

## 2025-07-13 PROCEDURE — 99284 EMERGENCY DEPT VISIT MOD MDM: CPT | Performed by: EMERGENCY MEDICINE

## 2025-07-13 PROCEDURE — 99282 EMERGENCY DEPT VISIT SF MDM: CPT

## 2025-07-13 RX ORDER — TETRACAINE HYDROCHLORIDE 5 MG/ML
2 SOLUTION OPHTHALMIC ONCE
Status: COMPLETED | OUTPATIENT
Start: 2025-07-13 | End: 2025-07-13

## 2025-07-13 RX ADMIN — TETRACAINE HYDROCHLORIDE 2 DROP: 5 SOLUTION OPHTHALMIC at 14:31

## 2025-07-13 RX ADMIN — FLUORESCEIN SODIUM 1 STRIP: 1 STRIP OPHTHALMIC at 14:31

## 2025-07-14 ENCOUNTER — VBI (OUTPATIENT)
Dept: INTERNAL MEDICINE CLINIC | Facility: CLINIC | Age: 70
End: 2025-07-14

## 2025-07-14 NOTE — TELEPHONE ENCOUNTER
07/14/25 11:47 AM    Patient contacted post ED visit, [facility] department spoke with patient/caregiver and outreach was successful.    Thank you.  Elizabeth Starks MA  [facility]

## 2025-07-25 ENCOUNTER — VBI (OUTPATIENT)
Dept: ADMINISTRATIVE | Facility: OTHER | Age: 70
End: 2025-07-25

## 2025-07-25 NOTE — TELEPHONE ENCOUNTER
07/25/25 9:59 AM     Chart reviewed for Diabetic Eye Exam was/were not submitted to the patient's insurance.     Roula Germain MA   [facility]

## 2025-08-11 ENCOUNTER — RA CDI HCC (OUTPATIENT)
Dept: OTHER | Facility: HOSPITAL | Age: 70
End: 2025-08-11

## 2025-08-14 ENCOUNTER — APPOINTMENT (OUTPATIENT)
Dept: LAB | Facility: CLINIC | Age: 70
End: 2025-08-14
Attending: INTERNAL MEDICINE
Payer: COMMERCIAL

## 2025-08-18 DIAGNOSIS — J44.9 CHRONIC OBSTRUCTIVE PULMONARY DISEASE, UNSPECIFIED COPD TYPE (HCC): ICD-10-CM

## 2025-08-19 ENCOUNTER — OFFICE VISIT (OUTPATIENT)
Dept: INTERNAL MEDICINE CLINIC | Facility: CLINIC | Age: 70
End: 2025-08-19
Payer: COMMERCIAL

## 2025-08-19 VITALS
BODY MASS INDEX: 33.05 KG/M2 | SYSTOLIC BLOOD PRESSURE: 128 MMHG | OXYGEN SATURATION: 94 % | HEIGHT: 72 IN | WEIGHT: 244 LBS | HEART RATE: 67 BPM | DIASTOLIC BLOOD PRESSURE: 80 MMHG | TEMPERATURE: 97.7 F

## 2025-08-19 DIAGNOSIS — I10 ESSENTIAL HYPERTENSION, BENIGN: Primary | ICD-10-CM

## 2025-08-19 DIAGNOSIS — E78.2 MIXED HYPERLIPIDEMIA: ICD-10-CM

## 2025-08-19 DIAGNOSIS — E53.8 B12 DEFICIENCY: ICD-10-CM

## 2025-08-19 DIAGNOSIS — E11.65 UNCONTROLLED TYPE 2 DIABETES MELLITUS WITH HYPERGLYCEMIA (HCC): ICD-10-CM

## 2025-08-19 PROBLEM — E11.36 TYPE 2 DIABETES MELLITUS WITH DIABETIC CATARACT, WITHOUT LONG-TERM CURRENT USE OF INSULIN (HCC): Status: ACTIVE | Noted: 2025-08-19

## 2025-08-19 PROBLEM — E11.36 TYPE 2 DIABETES MELLITUS WITH DIABETIC CATARACT, WITHOUT LONG-TERM CURRENT USE OF INSULIN (HCC): Status: RESOLVED | Noted: 2025-08-19 | Resolved: 2025-08-19

## 2025-08-19 PROCEDURE — 99214 OFFICE O/P EST MOD 30 MIN: CPT | Performed by: INTERNAL MEDICINE

## 2025-08-19 PROCEDURE — G2211 COMPLEX E/M VISIT ADD ON: HCPCS | Performed by: INTERNAL MEDICINE

## 2025-08-20 RX ORDER — UMECLIDINIUM BROMIDE AND VILANTEROL TRIFENATATE 62.5; 25 UG/1; UG/1
1 POWDER RESPIRATORY (INHALATION) DAILY
Qty: 180 EACH | Refills: 1 | Status: SHIPPED | OUTPATIENT
Start: 2025-08-20 | End: 2025-09-19

## (undated) DEVICE — INTENDED FOR TISSUE SEPARATION, AND OTHER PROCEDURES THAT REQUIRE A SHARP SURGICAL BLADE TO PUNCTURE OR CUT.: Brand: BARD-PARKER SAFETY BLADES SIZE 11, STERILE

## (undated) DEVICE — TISSUE RETRIEVAL SYSTEM: Brand: INZII RETRIEVAL SYSTEM

## (undated) DEVICE — GLOVE SRG BIOGEL ECLIPSE 7

## (undated) DEVICE — TROCAR: Brand: KII FIOS FIRST ENTRY

## (undated) DEVICE — STERILE SURGICAL LUBRICANT,  TUBE: Brand: SURGILUBE

## (undated) DEVICE — SUT MONOCRYL 4-0 PS-2 27 IN Y426H

## (undated) DEVICE — UNDYED BRAIDED (POLYGLACTIN 910), SYNTHETIC ABSORBABLE SUTURE: Brand: COATED VICRYL

## (undated) DEVICE — NEEDLE 23G X 1 1/2 SAFETY-GLIDE THIN WALL

## (undated) DEVICE — TROCAR APPPLE 5MM EXTENDED LENGTH

## (undated) DEVICE — DECANTER: Brand: UNBRANDED

## (undated) DEVICE — ETS45 RELOAD STANDARD 45MM: Brand: ENDOPATH

## (undated) DEVICE — HARMONIC 1100 SHEARS, 36CM SHAFT LENGTH: Brand: HARMONIC

## (undated) DEVICE — ENDOPATH ETS-FLEX45 ARTICULATING ENDOSCOPIC LINEAR CUTTER, NO RELOAD: Brand: ENDOPATH

## (undated) DEVICE — TOWEL SURG XR DETECT GREEN STRL RFD

## (undated) DEVICE — ADHESIVE SKIN HIGH VISCOSITY EXOFIN 1ML

## (undated) DEVICE — PACK PBDS LAP CHOLE RF

## (undated) DEVICE — DRAPE EQUIPMENT RF WAND